# Patient Record
Sex: MALE | Race: WHITE | HISPANIC OR LATINO | URBAN - METROPOLITAN AREA
[De-identification: names, ages, dates, MRNs, and addresses within clinical notes are randomized per-mention and may not be internally consistent; named-entity substitution may affect disease eponyms.]

---

## 2020-04-20 ENCOUNTER — TELEPHONE (OUTPATIENT)
Dept: FAMILY MEDICINE | Facility: CLINIC | Age: 85
End: 2020-04-20

## 2020-04-20 NOTE — TELEPHONE ENCOUNTER
Message left for patient to return call to clinic in re: needs to reschedule appointment due to Dr. Wilkerson being out of the clinic.

## 2020-04-22 ENCOUNTER — TELEPHONE (OUTPATIENT)
Dept: FAMILY MEDICINE | Facility: CLINIC | Age: 85
End: 2020-04-22

## 2020-10-27 ENCOUNTER — OFFICE VISIT (OUTPATIENT)
Dept: FAMILY MEDICINE | Facility: CLINIC | Age: 85
End: 2020-10-27
Attending: FAMILY MEDICINE
Payer: COMMERCIAL

## 2020-10-27 VITALS
HEIGHT: 65 IN | HEART RATE: 77 BPM | DIASTOLIC BLOOD PRESSURE: 70 MMHG | OXYGEN SATURATION: 96 % | TEMPERATURE: 98 F | SYSTOLIC BLOOD PRESSURE: 148 MMHG | BODY MASS INDEX: 25.29 KG/M2 | WEIGHT: 151.81 LBS

## 2020-10-27 DIAGNOSIS — Z00.00 WELLNESS EXAMINATION: Primary | ICD-10-CM

## 2020-10-27 DIAGNOSIS — Z72.0 TOBACCO ABUSE: ICD-10-CM

## 2020-10-27 DIAGNOSIS — R09.82 POST-NASAL DRIP: ICD-10-CM

## 2020-10-27 DIAGNOSIS — Z12.5 PROSTATE CANCER SCREENING: ICD-10-CM

## 2020-10-27 PROCEDURE — 1159F MED LIST DOCD IN RCRD: CPT | Mod: S$GLB,,, | Performed by: FAMILY MEDICINE

## 2020-10-27 PROCEDURE — 1159F PR MEDICATION LIST DOCUMENTED IN MEDICAL RECORD: ICD-10-PCS | Mod: S$GLB,,, | Performed by: FAMILY MEDICINE

## 2020-10-27 PROCEDURE — 99204 OFFICE O/P NEW MOD 45 MIN: CPT | Mod: 25,S$GLB,, | Performed by: FAMILY MEDICINE

## 2020-10-27 PROCEDURE — 99204 PR OFFICE/OUTPT VISIT, NEW, LEVL IV, 45-59 MIN: ICD-10-PCS | Mod: 25,S$GLB,, | Performed by: FAMILY MEDICINE

## 2020-10-27 PROCEDURE — 90694 FLU VACCINE - QUADRIVALENT - ADJUVANTED: ICD-10-PCS | Mod: S$GLB,,, | Performed by: FAMILY MEDICINE

## 2020-10-27 PROCEDURE — 99999 PR PBB SHADOW E&M-EST. PATIENT-LVL IV: ICD-10-PCS | Mod: PBBFAC,,, | Performed by: FAMILY MEDICINE

## 2020-10-27 PROCEDURE — 90471 IMMUNIZATION ADMIN: CPT | Mod: S$GLB,,, | Performed by: FAMILY MEDICINE

## 2020-10-27 PROCEDURE — 1101F PR PT FALLS ASSESS DOC 0-1 FALLS W/OUT INJ PAST YR: ICD-10-PCS | Mod: CPTII,S$GLB,, | Performed by: FAMILY MEDICINE

## 2020-10-27 PROCEDURE — 90471 FLU VACCINE - QUADRIVALENT - ADJUVANTED: ICD-10-PCS | Mod: S$GLB,,, | Performed by: FAMILY MEDICINE

## 2020-10-27 PROCEDURE — 1101F PT FALLS ASSESS-DOCD LE1/YR: CPT | Mod: CPTII,S$GLB,, | Performed by: FAMILY MEDICINE

## 2020-10-27 PROCEDURE — 1126F AMNT PAIN NOTED NONE PRSNT: CPT | Mod: S$GLB,,, | Performed by: FAMILY MEDICINE

## 2020-10-27 PROCEDURE — 90694 VACC AIIV4 NO PRSRV 0.5ML IM: CPT | Mod: S$GLB,,, | Performed by: FAMILY MEDICINE

## 2020-10-27 PROCEDURE — 1126F PR PAIN SEVERITY QUANTIFIED, NO PAIN PRESENT: ICD-10-PCS | Mod: S$GLB,,, | Performed by: FAMILY MEDICINE

## 2020-10-27 PROCEDURE — 99999 PR PBB SHADOW E&M-EST. PATIENT-LVL IV: CPT | Mod: PBBFAC,,, | Performed by: FAMILY MEDICINE

## 2020-10-27 RX ORDER — MINERAL OIL
180 ENEMA (ML) RECTAL DAILY
Qty: 30 TABLET | Refills: 11 | Status: SHIPPED | OUTPATIENT
Start: 2020-10-27 | End: 2021-04-10 | Stop reason: SDUPTHER

## 2020-10-27 NOTE — PROGRESS NOTES
Subjective:       Patient ID: Real Pagan is a 85 y.o. male.    Chief Complaint: Establish Care    85 y old male with tobacco abuse here to get est . Only concern is post nasal drip . Mainly with abrupt changes in whether. Not taking any  Meds. He is from [place] , he moved to [place] 2 [place] ago . Lives with his daughter . Will like flu shot . Stays active.Denies any visual difficulties . He used to be a  .  No depression . No sob , smoker cough , wheezes     Review of Systems   Constitutional: Negative.    HENT: Positive for postnasal drip.    Eyes: Negative.    Respiratory: Negative.    Cardiovascular: Negative.    Gastrointestinal: Negative.    Genitourinary: Negative.    Musculoskeletal: Negative.    Skin: Negative.    Hematological: Negative.        Objective:      Physical Exam  Constitutional:       General: He is not in acute distress.     Appearance: He is well-developed. He is not diaphoretic.   HENT:      Head: Normocephalic and atraumatic.      Right Ear: External ear normal.      Left Ear: External ear normal.      Nose: Nose normal.      Mouth/Throat:      Pharynx: No oropharyngeal exudate.   Eyes:      General: No scleral icterus.        Right eye: No discharge.         Left eye: No discharge.      Conjunctiva/sclera: Conjunctivae normal.      Pupils: Pupils are equal, round, and reactive to light.   Neck:      Musculoskeletal: Normal range of motion and neck supple.      Thyroid: No thyromegaly.      Vascular: No JVD.      Trachea: No tracheal deviation.   Cardiovascular:      Rate and Rhythm: Normal rate and regular rhythm.      Heart sounds: Normal heart sounds. No murmur. No friction rub. No gallop.    Pulmonary:      Effort: Pulmonary effort is normal. No respiratory distress.      Breath sounds: Normal breath sounds. No stridor. No wheezing or rales.   Chest:      Chest wall: No tenderness.   Abdominal:      General: Bowel sounds are normal. There is no distension.      Palpations: Abdomen  is soft.      Tenderness: There is no abdominal tenderness. There is no guarding or rebound.   Musculoskeletal: Normal range of motion.         General: No tenderness.   Lymphadenopathy:      Cervical: No cervical adenopathy.   Skin:     General: Skin is warm and dry.      Coloration: Skin is not pale.      Findings: No erythema or rash.   Neurological:      Mental Status: He is alert and oriented to person, place, and time.      Cranial Nerves: No cranial nerve deficit.      Motor: No abnormal muscle tone.      Coordination: Coordination normal.      Deep Tendon Reflexes: Reflexes are normal and symmetric. Reflexes normal.   Psychiatric:         Behavior: Behavior normal.         Thought Content: Thought content normal.         Judgment: Judgment normal.         Assessment:       Real was seen today for establish care.    Diagnoses and all orders for this visit:    Wellness examination  -     Ambulatory referral/consult to Ophthalmology; Future  -     CBC auto differential; Future  -     Comprehensive Metabolic Panel; Future  -     Hemoglobin A1C; Future  -     Lipid Panel; Future    Tobacco abuse    Prostate cancer screening  -     PSA, Screening; Future    Post-nasal drip    Other orders  -     Influenza - Quadrivalent (Adjuvanted)  -     fexofenadine (ALLEGRA) 180 MG tablet; Take 1 tablet (180 mg total) by mouth once daily.      Plan:     Real was seen today for establish care.    Diagnoses and all orders for this visit:    Tobacco abuse     Work on smoking cessation   PSA  PRN allegra   Elevated BP . Keep Home LOG . Zee in 3 w

## 2020-11-16 ENCOUNTER — OFFICE VISIT (OUTPATIENT)
Dept: OPHTHALMOLOGY | Facility: CLINIC | Age: 85
End: 2020-11-16
Attending: FAMILY MEDICINE
Payer: MEDICAID

## 2020-11-16 ENCOUNTER — LAB VISIT (OUTPATIENT)
Dept: LAB | Facility: HOSPITAL | Age: 85
End: 2020-11-16
Attending: FAMILY MEDICINE
Payer: MEDICAID

## 2020-11-16 DIAGNOSIS — Z00.00 WELLNESS EXAMINATION: ICD-10-CM

## 2020-11-16 DIAGNOSIS — Z12.5 PROSTATE CANCER SCREENING: ICD-10-CM

## 2020-11-16 DIAGNOSIS — Z96.1 PSEUDOPHAKIA OF BOTH EYES: Primary | ICD-10-CM

## 2020-11-16 DIAGNOSIS — Z01.00 ENCOUNTER FOR EYE EXAM: ICD-10-CM

## 2020-11-16 LAB
ALBUMIN SERPL BCP-MCNC: 3.8 G/DL (ref 3.5–5.2)
ALP SERPL-CCNC: 104 U/L (ref 55–135)
ALT SERPL W/O P-5'-P-CCNC: 17 U/L (ref 10–44)
ANION GAP SERPL CALC-SCNC: 10 MMOL/L (ref 8–16)
AST SERPL-CCNC: 16 U/L (ref 10–40)
BASOPHILS # BLD AUTO: 0.03 K/UL (ref 0–0.2)
BASOPHILS NFR BLD: 0.4 % (ref 0–1.9)
BILIRUB SERPL-MCNC: 0.2 MG/DL (ref 0.1–1)
BUN SERPL-MCNC: 18 MG/DL (ref 8–23)
CALCIUM SERPL-MCNC: 9 MG/DL (ref 8.7–10.5)
CHLORIDE SERPL-SCNC: 105 MMOL/L (ref 95–110)
CHOLEST SERPL-MCNC: 203 MG/DL (ref 120–199)
CHOLEST/HDLC SERPL: 6.8 {RATIO} (ref 2–5)
CO2 SERPL-SCNC: 28 MMOL/L (ref 23–29)
CREAT SERPL-MCNC: 0.8 MG/DL (ref 0.5–1.4)
DIFFERENTIAL METHOD: ABNORMAL
EOSINOPHIL # BLD AUTO: 0.1 K/UL (ref 0–0.5)
EOSINOPHIL NFR BLD: 1.8 % (ref 0–8)
ERYTHROCYTE [DISTWIDTH] IN BLOOD BY AUTOMATED COUNT: 13.9 % (ref 11.5–14.5)
EST. GFR  (AFRICAN AMERICAN): >60 ML/MIN/1.73 M^2
EST. GFR  (NON AFRICAN AMERICAN): >60 ML/MIN/1.73 M^2
ESTIMATED AVG GLUCOSE: 120 MG/DL (ref 68–131)
GLUCOSE SERPL-MCNC: 99 MG/DL (ref 70–110)
HBA1C MFR BLD HPLC: 5.8 % (ref 4–5.6)
HCT VFR BLD AUTO: 41.4 % (ref 40–54)
HDLC SERPL-MCNC: 30 MG/DL (ref 40–75)
HDLC SERPL: 14.8 % (ref 20–50)
HGB BLD-MCNC: 12.5 G/DL (ref 14–18)
IMM GRANULOCYTES # BLD AUTO: 0.01 K/UL (ref 0–0.04)
IMM GRANULOCYTES NFR BLD AUTO: 0.1 % (ref 0–0.5)
LDLC SERPL CALC-MCNC: 136.6 MG/DL (ref 63–159)
LYMPHOCYTES # BLD AUTO: 2.4 K/UL (ref 1–4.8)
LYMPHOCYTES NFR BLD: 32.6 % (ref 18–48)
MCH RBC QN AUTO: 29.1 PG (ref 27–31)
MCHC RBC AUTO-ENTMCNC: 30.2 G/DL (ref 32–36)
MCV RBC AUTO: 97 FL (ref 82–98)
MONOCYTES # BLD AUTO: 0.5 K/UL (ref 0.3–1)
MONOCYTES NFR BLD: 6.4 % (ref 4–15)
NEUTROPHILS # BLD AUTO: 4.3 K/UL (ref 1.8–7.7)
NEUTROPHILS NFR BLD: 58.7 % (ref 38–73)
NONHDLC SERPL-MCNC: 173 MG/DL
NRBC BLD-RTO: 0 /100 WBC
PLATELET # BLD AUTO: 154 K/UL (ref 150–350)
PMV BLD AUTO: 9.7 FL (ref 9.2–12.9)
POTASSIUM SERPL-SCNC: 3.9 MMOL/L (ref 3.5–5.1)
PROT SERPL-MCNC: 7.6 G/DL (ref 6–8.4)
RBC # BLD AUTO: 4.29 M/UL (ref 4.6–6.2)
SODIUM SERPL-SCNC: 143 MMOL/L (ref 136–145)
TRIGL SERPL-MCNC: 182 MG/DL (ref 30–150)
WBC # BLD AUTO: 7.31 K/UL (ref 3.9–12.7)

## 2020-11-16 PROCEDURE — 99212 OFFICE O/P EST SF 10 MIN: CPT | Mod: PBBFAC | Performed by: OPTOMETRIST

## 2020-11-16 PROCEDURE — 92015 DETERMINE REFRACTIVE STATE: CPT | Mod: ,,, | Performed by: OPTOMETRIST

## 2020-11-16 PROCEDURE — 84153 ASSAY OF PSA TOTAL: CPT

## 2020-11-16 PROCEDURE — 92004 PR EYE EXAM, NEW PATIENT,COMPREHESV: ICD-10-PCS | Mod: S$PBB,,, | Performed by: OPTOMETRIST

## 2020-11-16 PROCEDURE — 92015 PR REFRACTION: ICD-10-PCS | Mod: ,,, | Performed by: OPTOMETRIST

## 2020-11-16 PROCEDURE — 36415 COLL VENOUS BLD VENIPUNCTURE: CPT | Mod: PO

## 2020-11-16 PROCEDURE — 80061 LIPID PANEL: CPT

## 2020-11-16 PROCEDURE — 83036 HEMOGLOBIN GLYCOSYLATED A1C: CPT

## 2020-11-16 PROCEDURE — 99999 PR PBB SHADOW E&M-EST. PATIENT-LVL II: ICD-10-PCS | Mod: PBBFAC,,, | Performed by: OPTOMETRIST

## 2020-11-16 PROCEDURE — 80053 COMPREHEN METABOLIC PANEL: CPT

## 2020-11-16 PROCEDURE — 99999 PR PBB SHADOW E&M-EST. PATIENT-LVL II: CPT | Mod: PBBFAC,,, | Performed by: OPTOMETRIST

## 2020-11-16 PROCEDURE — 85025 COMPLETE CBC W/AUTO DIFF WBC: CPT

## 2020-11-16 PROCEDURE — 92004 COMPRE OPH EXAM NEW PT 1/>: CPT | Mod: S$PBB,,, | Performed by: OPTOMETRIST

## 2020-11-16 NOTE — PROGRESS NOTES
HPI     NP to Ophthalmology.  C/o decreased vision, problems seeing TV.  Doesn't   wear glasses. Had cataract surgery OU.    Last edited by Love Carrillo on 11/16/2020 11:24 AM. (History)            Assessment /Plan     For exam results, see Encounter Report.    Pseudophakia of both eyes    Wellness examination  -     Ambulatory referral/consult to Ophthalmology    Encounter for eye exam      Stable IOL OU.  Mild PCO OS    Dispense Final Rx for glasses.  RTC 1 year  Discussed above and answered questions.

## 2020-11-16 NOTE — LETTER
November 16, 2020      Shruthi Burr MD  [address] [facility] [address]           [facility]  [address] [place] [address]  Phone: [phone]  Fax: [phone]          Patient: Real Pagan   MR Number: [id number]   YOB: 1935   Date of Visit: 11/16/2020       Dear Dr. Shruthi Burr:    Thank you for referring Real Pagan to me for evaluation. Attached you will find relevant portions of my assessment and plan of care.    If you have questions, please do not hesitate to call me. I look forward to following Real Pagan along with you.    Sincerely,    Cristopher Napoles, OD    Enclosure  CC:  No Recipients    If you would like to receive this communication electronically, please contact [email or web address] or [phone] to request more information on [employer] Link access.    For providers and/or their staff who would like to refer a patient to [facility], please contact us through our one-stop-shop provider referral line, [facility], at [phone].    If you feel you have received this communication in error or would no longer like to receive these types of communications, please e-mail [email or web address]

## 2020-11-17 ENCOUNTER — TELEPHONE (OUTPATIENT)
Dept: FAMILY MEDICINE | Facility: CLINIC | Age: 85
End: 2020-11-17

## 2020-11-17 DIAGNOSIS — R97.20 ELEVATED PSA: Primary | ICD-10-CM

## 2020-11-17 LAB — COMPLEXED PSA SERPL-MCNC: 9.9 NG/ML (ref 0–4)

## 2020-11-23 ENCOUNTER — OFFICE VISIT (OUTPATIENT)
Dept: FAMILY MEDICINE | Facility: CLINIC | Age: 85
End: 2020-11-23
Attending: FAMILY MEDICINE
Payer: COMMERCIAL

## 2020-11-23 VITALS
HEIGHT: 65 IN | SYSTOLIC BLOOD PRESSURE: 126 MMHG | DIASTOLIC BLOOD PRESSURE: 78 MMHG | TEMPERATURE: 97 F | HEART RATE: 71 BPM | BODY MASS INDEX: 24.79 KG/M2 | WEIGHT: 148.81 LBS | OXYGEN SATURATION: 97 %

## 2020-11-23 DIAGNOSIS — E78.5 DYSLIPIDEMIA: ICD-10-CM

## 2020-11-23 DIAGNOSIS — D64.9 ANEMIA, UNSPECIFIED TYPE: Primary | ICD-10-CM

## 2020-11-23 DIAGNOSIS — R73.03 PRE-DIABETES: ICD-10-CM

## 2020-11-23 DIAGNOSIS — R97.20 ELEVATED PSA: ICD-10-CM

## 2020-11-23 PROCEDURE — 99214 PR OFFICE/OUTPT VISIT, EST, LEVL IV, 30-39 MIN: ICD-10-PCS | Mod: S$GLB,,, | Performed by: FAMILY MEDICINE

## 2020-11-23 PROCEDURE — 99214 OFFICE O/P EST MOD 30 MIN: CPT | Mod: S$GLB,,, | Performed by: FAMILY MEDICINE

## 2020-11-23 PROCEDURE — 3288F FALL RISK ASSESSMENT DOCD: CPT | Mod: CPTII,S$GLB,, | Performed by: FAMILY MEDICINE

## 2020-11-23 PROCEDURE — 1101F PT FALLS ASSESS-DOCD LE1/YR: CPT | Mod: CPTII,S$GLB,, | Performed by: FAMILY MEDICINE

## 2020-11-23 PROCEDURE — 1126F AMNT PAIN NOTED NONE PRSNT: CPT | Mod: S$GLB,,, | Performed by: FAMILY MEDICINE

## 2020-11-23 PROCEDURE — 99999 PR PBB SHADOW E&M-EST. PATIENT-LVL IV: ICD-10-PCS | Mod: PBBFAC,,, | Performed by: FAMILY MEDICINE

## 2020-11-23 PROCEDURE — 99999 PR PBB SHADOW E&M-EST. PATIENT-LVL IV: CPT | Mod: PBBFAC,,, | Performed by: FAMILY MEDICINE

## 2020-11-23 PROCEDURE — 1159F MED LIST DOCD IN RCRD: CPT | Mod: S$GLB,,, | Performed by: FAMILY MEDICINE

## 2020-11-23 PROCEDURE — 1101F PR PT FALLS ASSESS DOC 0-1 FALLS W/OUT INJ PAST YR: ICD-10-PCS | Mod: CPTII,S$GLB,, | Performed by: FAMILY MEDICINE

## 2020-11-23 PROCEDURE — 1159F PR MEDICATION LIST DOCUMENTED IN MEDICAL RECORD: ICD-10-PCS | Mod: S$GLB,,, | Performed by: FAMILY MEDICINE

## 2020-11-23 PROCEDURE — 1126F PR PAIN SEVERITY QUANTIFIED, NO PAIN PRESENT: ICD-10-PCS | Mod: S$GLB,,, | Performed by: FAMILY MEDICINE

## 2020-11-23 PROCEDURE — 3288F PR FALLS RISK ASSESSMENT DOCUMENTED: ICD-10-PCS | Mod: CPTII,S$GLB,, | Performed by: FAMILY MEDICINE

## 2020-11-23 RX ORDER — ROSUVASTATIN CALCIUM 10 MG/1
10 TABLET, COATED ORAL DAILY
Qty: 90 TABLET | Refills: 3 | Status: SHIPPED | OUTPATIENT
Start: 2020-11-23 | End: 2022-05-07

## 2020-11-23 NOTE — PROGRESS NOTES
Subjective:       Patient ID: Real Pagan is a 85 y.o. male.    Chief Complaint: Follow-up    85 y old male with tobacco abuse here to discuss most recent labs ( anemia, dlp ,pre dm and elevated pSA). He has urinary complains that he cant recall about 15 y ago which living in [place]. He was evaluated then . Denies hx of biopsy , no dysuria , urinary frequency , hesitancy . Not ready to quit smoking .     Review of Systems   Constitutional: Negative.    HENT: Negative.    Eyes: Negative.    Respiratory: Negative.    Cardiovascular: Negative.    Gastrointestinal: Negative.    Genitourinary: Negative.    Musculoskeletal: Negative.    Skin: Negative.    Hematological: Negative.        Objective:      Physical Exam  Constitutional:       General: He is not in acute distress.     Appearance: He is well-developed. He is not diaphoretic.   HENT:      Head: Normocephalic and atraumatic.      Right Ear: External ear normal.      Left Ear: External ear normal.      Nose: Nose normal.      Mouth/Throat:      Pharynx: No oropharyngeal exudate.   Eyes:      General: No scleral icterus.        Right eye: No discharge.         Left eye: No discharge.      Conjunctiva/sclera: Conjunctivae normal.      Pupils: Pupils are equal, round, and reactive to light.   Neck:      Musculoskeletal: Normal range of motion and neck supple.      Thyroid: No thyromegaly.      Vascular: No JVD.      Trachea: No tracheal deviation.   Cardiovascular:      Rate and Rhythm: Normal rate and regular rhythm.      Heart sounds: Normal heart sounds. No murmur. No friction rub. No gallop.    Pulmonary:      Effort: Pulmonary effort is normal. No respiratory distress.      Breath sounds: Normal breath sounds. No stridor. No wheezing or rales.   Chest:      Chest wall: No tenderness.   Abdominal:      General: Bowel sounds are normal. There is no distension.      Palpations: Abdomen is soft.      Tenderness: There is no abdominal tenderness. There is no  guarding or rebound.   Musculoskeletal: Normal range of motion.         General: No tenderness.   Lymphadenopathy:      Cervical: No cervical adenopathy.   Skin:     General: Skin is warm and dry.      Coloration: Skin is not pale.      Findings: No erythema or rash.   Neurological:      Mental Status: He is alert and oriented to person, place, and time.      Cranial Nerves: No cranial nerve deficit.      Motor: No abnormal muscle tone.      Coordination: Coordination normal.      Deep Tendon Reflexes: Reflexes are normal and symmetric. Reflexes normal.   Psychiatric:         Behavior: Behavior normal.         Thought Content: Thought content normal.         Judgment: Judgment normal.         Assessment:       1. Anemia, unspecified type    2. Dyslipidemia    3. Elevated PSA    4. Pre-diabetes        Plan:     Real was seen today for follow-up.    Diagnoses and all orders for this visit:    Anemia, unspecified type  -     Ambulatory referral/consult to Hematology / Oncology; Future    Dyslipidemia  -     rosuvastatin (CRESTOR) 10 MG tablet; Take 1 tablet (10 mg total) by mouth once daily.    Elevated PSA    Pre-diabetes     see hem   Crestor . SE discussed.  F.u  In 3 m   See Urology

## 2020-12-03 ENCOUNTER — OFFICE VISIT (OUTPATIENT)
Dept: UROLOGY | Facility: CLINIC | Age: 85
End: 2020-12-03
Attending: FAMILY MEDICINE
Payer: COMMERCIAL

## 2020-12-03 ENCOUNTER — LAB VISIT (OUTPATIENT)
Dept: LAB | Facility: HOSPITAL | Age: 85
End: 2020-12-03
Attending: INTERNAL MEDICINE
Payer: COMMERCIAL

## 2020-12-03 ENCOUNTER — OFFICE VISIT (OUTPATIENT)
Dept: HEMATOLOGY/ONCOLOGY | Facility: CLINIC | Age: 85
End: 2020-12-03
Attending: FAMILY MEDICINE
Payer: COMMERCIAL

## 2020-12-03 VITALS
HEART RATE: 71 BPM | WEIGHT: 151 LBS | TEMPERATURE: 98 F | BODY MASS INDEX: 25.16 KG/M2 | HEIGHT: 65 IN | SYSTOLIC BLOOD PRESSURE: 138 MMHG | DIASTOLIC BLOOD PRESSURE: 62 MMHG

## 2020-12-03 VITALS
DIASTOLIC BLOOD PRESSURE: 62 MMHG | BODY MASS INDEX: 25.27 KG/M2 | WEIGHT: 151.69 LBS | TEMPERATURE: 98 F | HEIGHT: 65 IN | SYSTOLIC BLOOD PRESSURE: 138 MMHG | OXYGEN SATURATION: 97 % | HEART RATE: 71 BPM

## 2020-12-03 DIAGNOSIS — D64.9 ANEMIA, UNSPECIFIED TYPE: ICD-10-CM

## 2020-12-03 DIAGNOSIS — R97.20 ELEVATED PSA: ICD-10-CM

## 2020-12-03 PROCEDURE — 1159F MED LIST DOCD IN RCRD: CPT | Mod: S$GLB,,, | Performed by: UROLOGY

## 2020-12-03 PROCEDURE — 1159F MED LIST DOCD IN RCRD: CPT | Mod: S$GLB,,, | Performed by: INTERNAL MEDICINE

## 2020-12-03 PROCEDURE — 82746 ASSAY OF FOLIC ACID SERUM: CPT

## 2020-12-03 PROCEDURE — 1126F PR PAIN SEVERITY QUANTIFIED, NO PAIN PRESENT: ICD-10-PCS | Mod: S$GLB,,, | Performed by: INTERNAL MEDICINE

## 2020-12-03 PROCEDURE — 1126F AMNT PAIN NOTED NONE PRSNT: CPT | Mod: S$GLB,,, | Performed by: INTERNAL MEDICINE

## 2020-12-03 PROCEDURE — 1159F PR MEDICATION LIST DOCUMENTED IN MEDICAL RECORD: ICD-10-PCS | Mod: S$GLB,,, | Performed by: INTERNAL MEDICINE

## 2020-12-03 PROCEDURE — 99999 PR PBB SHADOW E&M-EST. PATIENT-LVL III: CPT | Mod: PBBFAC,,, | Performed by: UROLOGY

## 2020-12-03 PROCEDURE — 99203 PR OFFICE/OUTPT VISIT, NEW, LEVL III, 30-44 MIN: ICD-10-PCS | Mod: S$GLB,,, | Performed by: UROLOGY

## 2020-12-03 PROCEDURE — 1126F AMNT PAIN NOTED NONE PRSNT: CPT | Mod: S$GLB,,, | Performed by: UROLOGY

## 2020-12-03 PROCEDURE — 1126F PR PAIN SEVERITY QUANTIFIED, NO PAIN PRESENT: ICD-10-PCS | Mod: S$GLB,,, | Performed by: UROLOGY

## 2020-12-03 PROCEDURE — 1101F PR PT FALLS ASSESS DOC 0-1 FALLS W/OUT INJ PAST YR: ICD-10-PCS | Mod: CPTII,S$GLB,, | Performed by: UROLOGY

## 2020-12-03 PROCEDURE — 36415 COLL VENOUS BLD VENIPUNCTURE: CPT

## 2020-12-03 PROCEDURE — 1101F PR PT FALLS ASSESS DOC 0-1 FALLS W/OUT INJ PAST YR: ICD-10-PCS | Mod: CPTII,S$GLB,, | Performed by: INTERNAL MEDICINE

## 2020-12-03 PROCEDURE — 99999 PR PBB SHADOW E&M-EST. PATIENT-LVL III: ICD-10-PCS | Mod: PBBFAC,,, | Performed by: UROLOGY

## 2020-12-03 PROCEDURE — 82728 ASSAY OF FERRITIN: CPT

## 2020-12-03 PROCEDURE — 3288F FALL RISK ASSESSMENT DOCD: CPT | Mod: CPTII,S$GLB,, | Performed by: INTERNAL MEDICINE

## 2020-12-03 PROCEDURE — 99999 PR PBB SHADOW E&M-EST. PATIENT-LVL IV: CPT | Mod: PBBFAC,,, | Performed by: INTERNAL MEDICINE

## 2020-12-03 PROCEDURE — 1159F PR MEDICATION LIST DOCUMENTED IN MEDICAL RECORD: ICD-10-PCS | Mod: S$GLB,,, | Performed by: UROLOGY

## 2020-12-03 PROCEDURE — 82607 VITAMIN B-12: CPT

## 2020-12-03 PROCEDURE — 99203 OFFICE O/P NEW LOW 30 MIN: CPT | Mod: S$GLB,,, | Performed by: UROLOGY

## 2020-12-03 PROCEDURE — 3288F FALL RISK ASSESSMENT DOCD: CPT | Mod: CPTII,S$GLB,, | Performed by: UROLOGY

## 2020-12-03 PROCEDURE — 99999 PR PBB SHADOW E&M-EST. PATIENT-LVL IV: ICD-10-PCS | Mod: PBBFAC,,, | Performed by: INTERNAL MEDICINE

## 2020-12-03 PROCEDURE — 3288F PR FALLS RISK ASSESSMENT DOCUMENTED: ICD-10-PCS | Mod: CPTII,S$GLB,, | Performed by: UROLOGY

## 2020-12-03 PROCEDURE — 99203 OFFICE O/P NEW LOW 30 MIN: CPT | Mod: S$GLB,,, | Performed by: INTERNAL MEDICINE

## 2020-12-03 PROCEDURE — 1101F PT FALLS ASSESS-DOCD LE1/YR: CPT | Mod: CPTII,S$GLB,, | Performed by: UROLOGY

## 2020-12-03 PROCEDURE — 3288F PR FALLS RISK ASSESSMENT DOCUMENTED: ICD-10-PCS | Mod: CPTII,S$GLB,, | Performed by: INTERNAL MEDICINE

## 2020-12-03 PROCEDURE — 1101F PT FALLS ASSESS-DOCD LE1/YR: CPT | Mod: CPTII,S$GLB,, | Performed by: INTERNAL MEDICINE

## 2020-12-03 PROCEDURE — 99203 PR OFFICE/OUTPT VISIT, NEW, LEVL III, 30-44 MIN: ICD-10-PCS | Mod: S$GLB,,, | Performed by: INTERNAL MEDICINE

## 2020-12-03 PROCEDURE — 83540 ASSAY OF IRON: CPT

## 2020-12-03 NOTE — PROGRESS NOTES
Chief Complaint: elevated PSA    HPI:   Real Pagan is a 85 y.o. male that presents today as a referral from Dr. Burr for elevated PSA.  Patient relocated from [place] about 4 years ago and currently resides with his family. He had a PSA drawn as part of annual screening which resulted at 9.9.  He denies a family history of prostate cancer, denies urinary symptoms, denies gross hematuria.  Does not take any medications for his prostate. Has never had a prior PSA drawn.      PMH:  Tobacco abuse, anemia, postnasal drip    PSH:  Past Surgical History:   Procedure Laterality Date    CATARACT EXTRACTION, BILATERAL         Family History:  Family History   Problem Relation Age of Onset    Diabetes Daughter        Social History:  Social History     Tobacco Use    Smoking status: Current Every Day Smoker     Packs/day: 0.50     Years: 63.00     Pack years: 31.50     Types: Cigarettes    Smokeless tobacco: Never Used   Substance Use Topics    Alcohol use: Yes     Frequency: Never    Drug use: Never        Review of Systems:  General: No fever, chills  Skin: No rashes  Chest:  Denies cough and sputum production  Heart: Denies chest pain  Resp: Denies dyspnea  Abdomen: Denies diarrhea, abdominal pain, hematemesis, or blood in stool.  Musculoskeletal: No joint stiffness or swelling. Denies back pain.  : see HPI  Neuro: no dizziness or weakness    Allergies:  Patient has no known allergies.    Medications:    Current Outpatient Medications:     fexofenadine (ALLEGRA) 180 MG tablet, Take 1 tablet (180 mg total) by mouth once daily., Disp: 30 tablet, Rfl: 11    rosuvastatin (CRESTOR) 10 MG tablet, Take 1 tablet (10 mg total) by mouth once daily., Disp: 90 tablet, Rfl: 3    Physical Exam:  Vitals:    12/03/20 1330   BP: 138/62   Pulse: 71   Temp: 98.2 °F (36.8 °C)     General: awake, alert, cooperative  Head: NC/AT  Ears: external ears normal  Eyes: sclera normal  Lungs: normal inspiration, NAD  Heart:  well-perfused  Abdomen: Soft, NT, ND  : Normal uncirc'd phallus, meatus normal in size and position, BL testicles palpable, no masses, nontender, no abnormalities of epididymi  KHURRAM: Normal rectal tone, no hemorrhoids. Prostate smooth and normal, no nodules 45 gm SV not palpable. Perineum and anus normal.  Lymphatic: groin nodes negative  Back: No CVA TTP, no spine TTP  Skin: The skin is warm and dry  Ext: No c/c/e.  Neuro: grossly intact, AOx3    RADIOLOGY:  No recent relevant imaging available for review.    LABS:  I personally reviewed the following lab values:  Lab Results   Component Value Date    WBC 7.31 11/16/2020    HGB 12.5 (L) 11/16/2020    HCT 41.4 11/16/2020     11/16/2020     11/16/2020    K 3.9 11/16/2020     11/16/2020    CREATININE 0.8 11/16/2020    BUN 18 11/16/2020    CO2 28 11/16/2020    PSA 9.9 (H) 11/16/2020    HGBA1C 5.8 (H) 11/16/2020    CHOL 203 (H) 11/16/2020    TRIG 182 (H) 11/16/2020    HDL 30 (L) 11/16/2020    ALT 17 11/16/2020    AST 16 11/16/2020       Assessment/Plan:   Real Pagan is a 85 y.o. male with elevated PSA. Normal KHURRAM today. Per the AUA guidelines PSA screening should stop at age 70 (in my opinion, up to 75 in very healthy patients). Would stop PSA screening at this time. F/u 1 yr for KHURRAM prostate cancer screening.    Thank you for allowing me the opportunity to participate in this patient's care.     Abhilash Saavedra MD  Urology

## 2020-12-03 NOTE — PROGRESS NOTES
Subjective:      DATE OF VISIT: 12/3/20     ?  Patient ID:?Real Pagan is a 85 y.o. male.?? MR#: [id number]   ?   REFERRING PROVIDER: Shruthi Burr MD  [address] [place] [id number]     ? Primary Care Providers:  Primary Doctor No (General)     CHIEF COMPLAINT: ?  Anemia??   ?   HPI    I had the pleasure meeting Mr. Pagan who is accompanied by his son today for evaluation of anemia.  Routine lab work showed hemoglobin 12.5, normocytic without other cytopenias.  PSA was checked and elevated to 9.9.  He has recently transferred his care and no prior lab work available.  We are assisted by Persian-speaking .  He denies any symptoms of fatigue, anorexia, unintentional weight loss, bony or other pain, urinary change/frequency, dysuria, hematuria, melena, hematochezia or other evidence of bleeding.  No prior personal or family history of malignancy.  He is quite active for his age and walks regularly.  He is an active smoker.  No current alcohol use.    Review of Systems    ?   A comprehensive 14-point review of systems was reviewed with patient and was negative other than as specified above.   ?   PAST MEDICAL HISTORY:   History reviewed. No pertinent past medical history. ?     PAST SURGICAL HISTORY:   Past Surgical History:   Procedure Laterality Date    CATARACT EXTRACTION, BILATERAL        ?   ALLERGIES:   Allergies as of 12/03/2020    (No Known Allergies)      ?   MEDICATIONS:?   No outpatient medications have been marked as taking for the 12/3/20 encounter (Office Visit) with Lindsay Paiz MD.      ?   SOCIAL HISTORY:?   Social History     Tobacco Use    Smoking status: Current Every Day Smoker     Packs/day: 0.50     Years: 63.00     Pack years: 31.50     Types: Cigarettes    Smokeless tobacco: Never Used   Substance Use Topics    Alcohol use: Yes     Frequency: Never      ?      ?   FAMILY HISTORY:   family history includes Diabetes in his daughter.    ?        Objective:      Physical Exam      ?   Vitals:    12/03/20 1514   BP: 138/62   Pulse: 71   Temp: 98.2 °F (36.8 °C)      ?   ECOG:?1   General appearance: Generally well appearing, in no acute distress.   Head, eyes, ears, nose, and throat: moist mucous membranes.   Respiratory:  Normal work of breathing  Abdomen: nontender, mildly protuberant..   Extremities: Warm, without edema.   Neurologic: Alert and oriented.   Skin: No rashes, ecchymoses or petechial lesion.   Psychiatric:  Normal mood and affect.    ?   Laboratory:  ?   No visits with results within 1 Day(s) from this visit.   Latest known visit with results is:   Lab Visit on 11/16/2020   Component Date Value Ref Range Status    WBC 11/16/2020 7.31  3.90 - 12.70 K/uL Final    RBC 11/16/2020 4.29* 4.60 - 6.20 M/uL Final    Hemoglobin 11/16/2020 12.5* 14.0 - 18.0 g/dL Final    Hematocrit 11/16/2020 41.4  40.0 - 54.0 % Final    MCV 11/16/2020 97  82 - 98 fL Final    MCH 11/16/2020 29.1  27.0 - 31.0 pg Final    MCHC 11/16/2020 30.2* 32.0 - 36.0 g/dL Final    RDW 11/16/2020 13.9  11.5 - 14.5 % Final    Platelets 11/16/2020 154  150 - 350 K/uL Final    MPV 11/16/2020 9.7  9.2 - 12.9 fL Final    Immature Granulocytes 11/16/2020 0.1  0.0 - 0.5 % Final    Gran # (ANC) 11/16/2020 4.3  1.8 - 7.7 K/uL Final    Immature Grans (Abs) 11/16/2020 0.01  0.00 - 0.04 K/uL Final    Lymph # 11/16/2020 2.4  1.0 - 4.8 K/uL Final    Mono # 11/16/2020 0.5  0.3 - 1.0 K/uL Final    Eos # 11/16/2020 0.1  0.0 - 0.5 K/uL Final    Baso # 11/16/2020 0.03  0.00 - 0.20 K/uL Final    nRBC 11/16/2020 0  0 /100 WBC Final    Gran % 11/16/2020 58.7  38.0 - 73.0 % Final    Lymph % 11/16/2020 32.6  18.0 - 48.0 % Final    Mono % 11/16/2020 6.4  4.0 - 15.0 % Final    Eosinophil % 11/16/2020 1.8  0.0 - 8.0 % Final    Basophil % 11/16/2020 0.4  0.0 - 1.9 % Final    Differential Method 11/16/2020 Automated   Final    Sodium 11/16/2020 143  136 - 145 mmol/L Final     Potassium 11/16/2020 3.9  3.5 - 5.1 mmol/L Final    Chloride 11/16/2020 105  95 - 110 mmol/L Final    CO2 11/16/2020 28  23 - 29 mmol/L Final    Glucose 11/16/2020 99  70 - 110 mg/dL Final    BUN 11/16/2020 18  8 - 23 mg/dL Final    Creatinine 11/16/2020 0.8  0.5 - 1.4 mg/dL Final    Calcium 11/16/2020 9.0  8.7 - 10.5 mg/dL Final    Total Protein 11/16/2020 7.6  6.0 - 8.4 g/dL Final    Albumin 11/16/2020 3.8  3.5 - 5.2 g/dL Final    Total Bilirubin 11/16/2020 0.2  0.1 - 1.0 mg/dL Final    Alkaline Phosphatase 11/16/2020 104  55 - 135 U/L Final    AST 11/16/2020 16  10 - 40 U/L Final    ALT 11/16/2020 17  10 - 44 U/L Final    Anion Gap 11/16/2020 10  8 - 16 mmol/L Final    eGFR if African American 11/16/2020 >60.0  >60 mL/min/1.73 m^2 Final    eGFR if non African American 11/16/2020 >60.0  >60 mL/min/1.73 m^2 Final    Hemoglobin A1C 11/16/2020 5.8* 4.0 - 5.6 % Final    Estimated Avg Glucose 11/16/2020 120  68 - 131 mg/dL Final    Cholesterol 11/16/2020 203* 120 - 199 mg/dL Final    Triglycerides 11/16/2020 182* 30 - 150 mg/dL Final    HDL 11/16/2020 30* 40 - 75 mg/dL Final    LDL Cholesterol 11/16/2020 136.6  63.0 - 159.0 mg/dL Final    HDL/Cholesterol Ratio 11/16/2020 14.8* 20.0 - 50.0 % Final    Total Cholesterol/HDL Ratio 11/16/2020 6.8* 2.0 - 5.0 Final    Non-HDL Cholesterol 11/16/2020 173  mg/dL Final    PSA, Screen 11/16/2020 9.9* 0.00 - 4.00 ng/mL Final          ?   Assessment/Plan:   Anemia, unspecified type  -     Ambulatory referral/consult to Hematology / Oncology  -     Ferritin; Future; Expected date: 12/03/2020  -     Iron and TIBC; Future; Expected date: 12/03/2020  -     Vitamin B12; Future; Expected date: 12/03/2020  -     Folate; Future; Expected date: 12/03/2020  -     CBC auto differential; Future; Expected date: 06/03/2021       1. Anemia, unspecified type          Plan:     # anemia:  Mild anemia, normocytic, hemoglobin 12.5, no prior labs available to clarify duration.   It does not appear he is symptomatic from his mild anemia.  I discussed the broad differential for anemia including iron or other nutrient deficiency, anemia of chronic illness, bone marrow disorder or malignant process.  He did have follow-up with Urology earlier today for abnormal PSA and noted to have normal KHURRAM; I did discuss that cannot exclude potential malignancy however there are wrist to further evaluation and invasive biopsy; as he is asymptomatic he has opted for surveillance with Urology which is quite reasonable given his age and asymptomatic nature.  Similarly I do not feel that further invasive testing or bone marrow biopsy is appropriate at this point but can check nutritional studies and supplement if needed. Mr. Pagan and his son are in agreement plan for limited blood workup today and I will call with results and surveillance with repeat CBC in 6 months.      Follow-Up:   Patient Instructions   Labs today  RV in 6 months with cbc same day prior

## 2020-12-03 NOTE — LETTER
December 3, 2020      Shruthi Burr MD  [address] [facility] [address]           [facility]  [address] [facility] [address]  [place] [address]  Phone: [phone]  Fax: [phone]          Patient: Real Pagan   MR Number: [id number]   YOB: 1935   Date of Visit: 12/3/2020       Dear Dr. Shruthi Burr:    Thank you for referring Real Pagan to me for evaluation. Attached you will find relevant portions of my assessment and plan of care.    If you have questions, please do not hesitate to call me. I look forward to following Real Pagan along with you.    Sincerely,    Abhilash Saavedra MD    Enclosure  CC:  No Recipients    If you would like to receive this communication electronically, please contact [email or web address] or [phone] to request more information on [employer] Link access.    For providers and/or their staff who would like to refer a patient to [facility], please contact us through our one-stop-shop provider referral line, [facility], at [phone].    If you feel you have received this communication in error or would no longer like to receive these types of communications, please e-mail [email or web address]

## 2020-12-03 NOTE — LETTER
December 3, 2020      Shruthi Burr MD  [address] [facility] [address]           [facility]  [address]  [place] [address]  Phone: [phone]  Fax: [phone]          Patient: Real Pagan   MR Number: [id number]   YOB: 1935   Date of Visit: 12/3/2020       Dear Dr. Shruthi Burr:    Thank you for referring Real Pagan to me for evaluation. Attached you will find relevant portions of my assessment and plan of care.    If you have questions, please do not hesitate to call me. I look forward to following Real Pagan along with you.    Sincerely,    Lindsay Paiz MD    Enclosure  CC:  No Recipients    If you would like to receive this communication electronically, please contact [email or web address] or [phone] to request more information on [employer] Link access.    For providers and/or their staff who would like to refer a patient to [facility], please contact us through our one-stop-shop provider referral line, [facility], at [phone].    If you feel you have received this communication in error or would no longer like to receive these types of communications, please e-mail [email or web address]

## 2020-12-04 ENCOUNTER — TELEPHONE (OUTPATIENT)
Dept: HEMATOLOGY/ONCOLOGY | Facility: CLINIC | Age: 85
End: 2020-12-04

## 2020-12-04 LAB
FERRITIN SERPL-MCNC: 34 NG/ML (ref 20–300)
FOLATE SERPL-MCNC: 4.8 NG/ML (ref 4–24)
IRON SERPL-MCNC: 38 UG/DL (ref 45–160)
SATURATED IRON: 12 % (ref 20–50)
TOTAL IRON BINDING CAPACITY: 324 UG/DL (ref 250–450)
TRANSFERRIN SERPL-MCNC: 219 MG/DL (ref 200–375)
VIT B12 SERPL-MCNC: 244 PG/ML (ref 210–950)

## 2021-01-05 ENCOUNTER — OFFICE VISIT (OUTPATIENT)
Dept: HEMATOLOGY/ONCOLOGY | Facility: CLINIC | Age: 86
End: 2021-01-05
Payer: COMMERCIAL

## 2021-01-05 VITALS
HEART RATE: 66 BPM | DIASTOLIC BLOOD PRESSURE: 63 MMHG | OXYGEN SATURATION: 98 % | TEMPERATURE: 98 F | SYSTOLIC BLOOD PRESSURE: 137 MMHG | WEIGHT: 150.56 LBS | BODY MASS INDEX: 25.06 KG/M2

## 2021-01-05 DIAGNOSIS — D64.9 ANEMIA, UNSPECIFIED TYPE: Primary | ICD-10-CM

## 2021-01-05 PROCEDURE — 3288F FALL RISK ASSESSMENT DOCD: CPT | Mod: CPTII,S$GLB,, | Performed by: INTERNAL MEDICINE

## 2021-01-05 PROCEDURE — 1126F AMNT PAIN NOTED NONE PRSNT: CPT | Mod: S$GLB,,, | Performed by: INTERNAL MEDICINE

## 2021-01-05 PROCEDURE — 99213 OFFICE O/P EST LOW 20 MIN: CPT | Mod: S$GLB,,, | Performed by: INTERNAL MEDICINE

## 2021-01-05 PROCEDURE — 99213 PR OFFICE/OUTPT VISIT, EST, LEVL III, 20-29 MIN: ICD-10-PCS | Mod: S$GLB,,, | Performed by: INTERNAL MEDICINE

## 2021-01-05 PROCEDURE — 3288F PR FALLS RISK ASSESSMENT DOCUMENTED: ICD-10-PCS | Mod: CPTII,S$GLB,, | Performed by: INTERNAL MEDICINE

## 2021-01-05 PROCEDURE — 99999 PR PBB SHADOW E&M-EST. PATIENT-LVL III: ICD-10-PCS | Mod: PBBFAC,,, | Performed by: INTERNAL MEDICINE

## 2021-01-05 PROCEDURE — 1101F PT FALLS ASSESS-DOCD LE1/YR: CPT | Mod: CPTII,S$GLB,, | Performed by: INTERNAL MEDICINE

## 2021-01-05 PROCEDURE — 1126F PR PAIN SEVERITY QUANTIFIED, NO PAIN PRESENT: ICD-10-PCS | Mod: S$GLB,,, | Performed by: INTERNAL MEDICINE

## 2021-01-05 PROCEDURE — 1101F PR PT FALLS ASSESS DOC 0-1 FALLS W/OUT INJ PAST YR: ICD-10-PCS | Mod: CPTII,S$GLB,, | Performed by: INTERNAL MEDICINE

## 2021-01-05 PROCEDURE — 1159F PR MEDICATION LIST DOCUMENTED IN MEDICAL RECORD: ICD-10-PCS | Mod: S$GLB,,, | Performed by: INTERNAL MEDICINE

## 2021-01-05 PROCEDURE — 99999 PR PBB SHADOW E&M-EST. PATIENT-LVL III: CPT | Mod: PBBFAC,,, | Performed by: INTERNAL MEDICINE

## 2021-01-05 PROCEDURE — 1159F MED LIST DOCD IN RCRD: CPT | Mod: S$GLB,,, | Performed by: INTERNAL MEDICINE

## 2021-03-29 ENCOUNTER — OFFICE VISIT (OUTPATIENT)
Dept: FAMILY MEDICINE | Facility: CLINIC | Age: 86
End: 2021-03-29
Attending: FAMILY MEDICINE
Payer: COMMERCIAL

## 2021-03-29 VITALS
OXYGEN SATURATION: 97 % | BODY MASS INDEX: 24.91 KG/M2 | HEART RATE: 67 BPM | WEIGHT: 149.5 LBS | SYSTOLIC BLOOD PRESSURE: 132 MMHG | TEMPERATURE: 98 F | HEIGHT: 65 IN | DIASTOLIC BLOOD PRESSURE: 64 MMHG

## 2021-03-29 DIAGNOSIS — R73.03 PRE-DIABETES: ICD-10-CM

## 2021-03-29 DIAGNOSIS — Z72.0 TOBACCO ABUSE: ICD-10-CM

## 2021-03-29 DIAGNOSIS — E78.5 DYSLIPIDEMIA: ICD-10-CM

## 2021-03-29 DIAGNOSIS — D50.8 OTHER IRON DEFICIENCY ANEMIA: Primary | ICD-10-CM

## 2021-03-29 PROCEDURE — 99999 PR PBB SHADOW E&M-EST. PATIENT-LVL III: CPT | Mod: PBBFAC,,, | Performed by: FAMILY MEDICINE

## 2021-03-29 PROCEDURE — 1101F PR PT FALLS ASSESS DOC 0-1 FALLS W/OUT INJ PAST YR: ICD-10-PCS | Mod: CPTII,S$GLB,, | Performed by: FAMILY MEDICINE

## 2021-03-29 PROCEDURE — 1159F PR MEDICATION LIST DOCUMENTED IN MEDICAL RECORD: ICD-10-PCS | Mod: S$GLB,,, | Performed by: FAMILY MEDICINE

## 2021-03-29 PROCEDURE — 99999 PR PBB SHADOW E&M-EST. PATIENT-LVL III: ICD-10-PCS | Mod: PBBFAC,,, | Performed by: FAMILY MEDICINE

## 2021-03-29 PROCEDURE — 1101F PT FALLS ASSESS-DOCD LE1/YR: CPT | Mod: CPTII,S$GLB,, | Performed by: FAMILY MEDICINE

## 2021-03-29 PROCEDURE — 1159F MED LIST DOCD IN RCRD: CPT | Mod: S$GLB,,, | Performed by: FAMILY MEDICINE

## 2021-03-29 PROCEDURE — 99214 OFFICE O/P EST MOD 30 MIN: CPT | Mod: S$GLB,,, | Performed by: FAMILY MEDICINE

## 2021-03-29 PROCEDURE — 99214 PR OFFICE/OUTPT VISIT, EST, LEVL IV, 30-39 MIN: ICD-10-PCS | Mod: S$GLB,,, | Performed by: FAMILY MEDICINE

## 2021-03-29 PROCEDURE — 3288F PR FALLS RISK ASSESSMENT DOCUMENTED: ICD-10-PCS | Mod: CPTII,S$GLB,, | Performed by: FAMILY MEDICINE

## 2021-03-29 PROCEDURE — 3288F FALL RISK ASSESSMENT DOCD: CPT | Mod: CPTII,S$GLB,, | Performed by: FAMILY MEDICINE

## 2021-03-31 ENCOUNTER — LAB VISIT (OUTPATIENT)
Dept: LAB | Facility: HOSPITAL | Age: 86
End: 2021-03-31
Attending: FAMILY MEDICINE
Payer: COMMERCIAL

## 2021-03-31 DIAGNOSIS — R73.03 PRE-DIABETES: ICD-10-CM

## 2021-03-31 DIAGNOSIS — D50.8 OTHER IRON DEFICIENCY ANEMIA: ICD-10-CM

## 2021-03-31 DIAGNOSIS — E78.5 DYSLIPIDEMIA: ICD-10-CM

## 2021-03-31 LAB
ALBUMIN SERPL BCP-MCNC: 3.6 G/DL (ref 3.5–5.2)
ALP SERPL-CCNC: 99 U/L (ref 55–135)
ALT SERPL W/O P-5'-P-CCNC: 16 U/L (ref 10–44)
ANION GAP SERPL CALC-SCNC: 9 MMOL/L (ref 8–16)
AST SERPL-CCNC: 16 U/L (ref 10–40)
BASOPHILS # BLD AUTO: 0.02 K/UL (ref 0–0.2)
BASOPHILS NFR BLD: 0.3 % (ref 0–1.9)
BILIRUB SERPL-MCNC: 0.3 MG/DL (ref 0.1–1)
BUN SERPL-MCNC: 14 MG/DL (ref 8–23)
CALCIUM SERPL-MCNC: 8.4 MG/DL (ref 8.7–10.5)
CHLORIDE SERPL-SCNC: 106 MMOL/L (ref 95–110)
CHOLEST SERPL-MCNC: 122 MG/DL (ref 120–199)
CHOLEST/HDLC SERPL: 4.4 {RATIO} (ref 2–5)
CO2 SERPL-SCNC: 28 MMOL/L (ref 23–29)
CREAT SERPL-MCNC: 0.8 MG/DL (ref 0.5–1.4)
DIFFERENTIAL METHOD: ABNORMAL
EOSINOPHIL # BLD AUTO: 0.2 K/UL (ref 0–0.5)
EOSINOPHIL NFR BLD: 2.1 % (ref 0–8)
ERYTHROCYTE [DISTWIDTH] IN BLOOD BY AUTOMATED COUNT: 14 % (ref 11.5–14.5)
EST. GFR  (AFRICAN AMERICAN): >60 ML/MIN/1.73 M^2
EST. GFR  (NON AFRICAN AMERICAN): >60 ML/MIN/1.73 M^2
ESTIMATED AVG GLUCOSE: 120 MG/DL (ref 68–131)
FERRITIN SERPL-MCNC: 59 NG/ML (ref 20–300)
GLUCOSE SERPL-MCNC: 94 MG/DL (ref 70–110)
HBA1C MFR BLD: 5.8 % (ref 4–5.6)
HCT VFR BLD AUTO: 38.8 % (ref 40–54)
HDLC SERPL-MCNC: 28 MG/DL (ref 40–75)
HDLC SERPL: 23 % (ref 20–50)
HGB BLD-MCNC: 12 G/DL (ref 14–18)
IMM GRANULOCYTES # BLD AUTO: 0.01 K/UL (ref 0–0.04)
IMM GRANULOCYTES NFR BLD AUTO: 0.1 % (ref 0–0.5)
IRON SERPL-MCNC: 33 UG/DL (ref 45–160)
LDLC SERPL CALC-MCNC: 68.8 MG/DL (ref 63–159)
LYMPHOCYTES # BLD AUTO: 2.3 K/UL (ref 1–4.8)
LYMPHOCYTES NFR BLD: 31.9 % (ref 18–48)
MCH RBC QN AUTO: 29.1 PG (ref 27–31)
MCHC RBC AUTO-ENTMCNC: 30.9 G/DL (ref 32–36)
MCV RBC AUTO: 94 FL (ref 82–98)
MONOCYTES # BLD AUTO: 0.6 K/UL (ref 0.3–1)
MONOCYTES NFR BLD: 7.7 % (ref 4–15)
NEUTROPHILS # BLD AUTO: 4.1 K/UL (ref 1.8–7.7)
NEUTROPHILS NFR BLD: 57.9 % (ref 38–73)
NONHDLC SERPL-MCNC: 94 MG/DL
NRBC BLD-RTO: 0 /100 WBC
PLATELET # BLD AUTO: 135 K/UL (ref 150–450)
PMV BLD AUTO: 10 FL (ref 9.2–12.9)
POTASSIUM SERPL-SCNC: 3.9 MMOL/L (ref 3.5–5.1)
PROT SERPL-MCNC: 7.1 G/DL (ref 6–8.4)
RBC # BLD AUTO: 4.13 M/UL (ref 4.6–6.2)
SATURATED IRON: 12 % (ref 20–50)
SODIUM SERPL-SCNC: 143 MMOL/L (ref 136–145)
TOTAL IRON BINDING CAPACITY: 278 UG/DL (ref 250–450)
TRANSFERRIN SERPL-MCNC: 188 MG/DL (ref 200–375)
TRIGL SERPL-MCNC: 126 MG/DL (ref 30–150)
WBC # BLD AUTO: 7.11 K/UL (ref 3.9–12.7)

## 2021-03-31 PROCEDURE — 80061 LIPID PANEL: CPT | Performed by: FAMILY MEDICINE

## 2021-03-31 PROCEDURE — 36415 COLL VENOUS BLD VENIPUNCTURE: CPT | Mod: PO | Performed by: FAMILY MEDICINE

## 2021-03-31 PROCEDURE — 82728 ASSAY OF FERRITIN: CPT | Performed by: FAMILY MEDICINE

## 2021-03-31 PROCEDURE — 83540 ASSAY OF IRON: CPT | Performed by: FAMILY MEDICINE

## 2021-03-31 PROCEDURE — 85025 COMPLETE CBC W/AUTO DIFF WBC: CPT | Performed by: FAMILY MEDICINE

## 2021-03-31 PROCEDURE — 83036 HEMOGLOBIN GLYCOSYLATED A1C: CPT | Performed by: FAMILY MEDICINE

## 2021-03-31 PROCEDURE — 80053 COMPREHEN METABOLIC PANEL: CPT | Performed by: FAMILY MEDICINE

## 2021-04-12 RX ORDER — MINERAL OIL
180 ENEMA (ML) RECTAL DAILY
Qty: 30 TABLET | Refills: 11 | Status: SHIPPED | OUTPATIENT
Start: 2021-04-12 | End: 2021-08-12

## 2021-04-21 ENCOUNTER — OFFICE VISIT (OUTPATIENT)
Dept: HEMATOLOGY/ONCOLOGY | Facility: CLINIC | Age: 86
End: 2021-04-21
Payer: COMMERCIAL

## 2021-04-21 VITALS
WEIGHT: 146.63 LBS | HEART RATE: 67 BPM | OXYGEN SATURATION: 99 % | DIASTOLIC BLOOD PRESSURE: 63 MMHG | BODY MASS INDEX: 24.4 KG/M2 | SYSTOLIC BLOOD PRESSURE: 134 MMHG | TEMPERATURE: 97 F

## 2021-04-21 DIAGNOSIS — D64.9 ANEMIA, UNSPECIFIED TYPE: Primary | ICD-10-CM

## 2021-04-21 DIAGNOSIS — D69.6 THROMBOCYTOPENIA: ICD-10-CM

## 2021-04-21 PROCEDURE — 99999 PR PBB SHADOW E&M-EST. PATIENT-LVL II: CPT | Mod: PBBFAC,,, | Performed by: INTERNAL MEDICINE

## 2021-04-21 PROCEDURE — 3288F FALL RISK ASSESSMENT DOCD: CPT | Mod: CPTII,S$GLB,, | Performed by: INTERNAL MEDICINE

## 2021-04-21 PROCEDURE — 1101F PR PT FALLS ASSESS DOC 0-1 FALLS W/OUT INJ PAST YR: ICD-10-PCS | Mod: CPTII,S$GLB,, | Performed by: INTERNAL MEDICINE

## 2021-04-21 PROCEDURE — 1101F PT FALLS ASSESS-DOCD LE1/YR: CPT | Mod: CPTII,S$GLB,, | Performed by: INTERNAL MEDICINE

## 2021-04-21 PROCEDURE — 99213 OFFICE O/P EST LOW 20 MIN: CPT | Mod: S$GLB,,, | Performed by: INTERNAL MEDICINE

## 2021-04-21 PROCEDURE — 99999 PR PBB SHADOW E&M-EST. PATIENT-LVL II: ICD-10-PCS | Mod: PBBFAC,,, | Performed by: INTERNAL MEDICINE

## 2021-04-21 PROCEDURE — 1159F PR MEDICATION LIST DOCUMENTED IN MEDICAL RECORD: ICD-10-PCS | Mod: S$GLB,,, | Performed by: INTERNAL MEDICINE

## 2021-04-21 PROCEDURE — 1126F PR PAIN SEVERITY QUANTIFIED, NO PAIN PRESENT: ICD-10-PCS | Mod: S$GLB,,, | Performed by: INTERNAL MEDICINE

## 2021-04-21 PROCEDURE — 99213 PR OFFICE/OUTPT VISIT, EST, LEVL III, 20-29 MIN: ICD-10-PCS | Mod: S$GLB,,, | Performed by: INTERNAL MEDICINE

## 2021-04-21 PROCEDURE — 1126F AMNT PAIN NOTED NONE PRSNT: CPT | Mod: S$GLB,,, | Performed by: INTERNAL MEDICINE

## 2021-04-21 PROCEDURE — 3288F PR FALLS RISK ASSESSMENT DOCUMENTED: ICD-10-PCS | Mod: CPTII,S$GLB,, | Performed by: INTERNAL MEDICINE

## 2021-04-21 PROCEDURE — 1159F MED LIST DOCD IN RCRD: CPT | Mod: S$GLB,,, | Performed by: INTERNAL MEDICINE

## 2021-04-21 RX ORDER — FERROUS SULFATE 325(65) MG
325 TABLET ORAL
COMMUNITY
End: 2021-08-12

## 2021-08-12 ENCOUNTER — OFFICE VISIT (OUTPATIENT)
Dept: FAMILY MEDICINE | Facility: CLINIC | Age: 86
End: 2021-08-12
Attending: FAMILY MEDICINE
Payer: MEDICAID

## 2021-08-12 VITALS
DIASTOLIC BLOOD PRESSURE: 70 MMHG | BODY MASS INDEX: 23.47 KG/M2 | HEART RATE: 64 BPM | SYSTOLIC BLOOD PRESSURE: 130 MMHG | OXYGEN SATURATION: 99 % | HEIGHT: 65 IN | TEMPERATURE: 98 F | WEIGHT: 140.88 LBS

## 2021-08-12 DIAGNOSIS — M54.9 DORSALGIA, UNSPECIFIED: ICD-10-CM

## 2021-08-12 PROCEDURE — 99999 PR PBB SHADOW E&M-EST. PATIENT-LVL IV: ICD-10-PCS | Mod: PBBFAC,,, | Performed by: FAMILY MEDICINE

## 2021-08-12 PROCEDURE — 99214 OFFICE O/P EST MOD 30 MIN: CPT | Mod: PBBFAC,PO | Performed by: FAMILY MEDICINE

## 2021-08-12 PROCEDURE — 90471 IMMUNIZATION ADMIN: CPT | Mod: PBBFAC,PO

## 2021-08-12 PROCEDURE — 99214 OFFICE O/P EST MOD 30 MIN: CPT | Mod: 25,S$PBB,, | Performed by: FAMILY MEDICINE

## 2021-08-12 PROCEDURE — 99214 PR OFFICE/OUTPT VISIT, EST, LEVL IV, 30-39 MIN: ICD-10-PCS | Mod: 25,S$PBB,, | Performed by: FAMILY MEDICINE

## 2021-08-12 PROCEDURE — 99999 PR PBB SHADOW E&M-EST. PATIENT-LVL IV: CPT | Mod: PBBFAC,,, | Performed by: FAMILY MEDICINE

## 2021-08-12 RX ORDER — ASPIRIN 81 MG/1
81 TABLET ORAL DAILY
COMMUNITY
End: 2022-06-21

## 2021-10-26 ENCOUNTER — TELEPHONE (OUTPATIENT)
Dept: HEMATOLOGY/ONCOLOGY | Facility: CLINIC | Age: 86
End: 2021-10-26
Payer: MEDICAID

## 2021-12-02 ENCOUNTER — PATIENT OUTREACH (OUTPATIENT)
Dept: ADMINISTRATIVE | Facility: OTHER | Age: 86
End: 2021-12-02
Payer: MEDICAID

## 2021-12-03 ENCOUNTER — OFFICE VISIT (OUTPATIENT)
Dept: UROLOGY | Facility: CLINIC | Age: 86
End: 2021-12-03
Payer: MEDICAID

## 2021-12-03 VITALS
DIASTOLIC BLOOD PRESSURE: 68 MMHG | TEMPERATURE: 98 F | WEIGHT: 138.44 LBS | HEART RATE: 76 BPM | HEIGHT: 65 IN | BODY MASS INDEX: 23.07 KG/M2 | SYSTOLIC BLOOD PRESSURE: 135 MMHG

## 2021-12-03 DIAGNOSIS — Z12.5 PROSTATE CANCER SCREENING: Primary | ICD-10-CM

## 2021-12-03 PROCEDURE — 99213 OFFICE O/P EST LOW 20 MIN: CPT | Mod: PBBFAC | Performed by: UROLOGY

## 2021-12-03 PROCEDURE — 99999 PR PBB SHADOW E&M-EST. PATIENT-LVL III: ICD-10-PCS | Mod: PBBFAC,,, | Performed by: UROLOGY

## 2021-12-03 PROCEDURE — 99999 PR PBB SHADOW E&M-EST. PATIENT-LVL III: CPT | Mod: PBBFAC,,, | Performed by: UROLOGY

## 2021-12-03 PROCEDURE — 99213 PR OFFICE/OUTPT VISIT, EST, LEVL III, 20-29 MIN: ICD-10-PCS | Mod: S$PBB,,, | Performed by: UROLOGY

## 2021-12-03 PROCEDURE — 99213 OFFICE O/P EST LOW 20 MIN: CPT | Mod: S$PBB,,, | Performed by: UROLOGY

## 2021-12-03 RX ORDER — FERROUS SULFATE 325(65) MG
325 TABLET ORAL
COMMUNITY

## 2021-12-06 ENCOUNTER — TELEPHONE (OUTPATIENT)
Dept: FAMILY MEDICINE | Facility: CLINIC | Age: 86
End: 2021-12-06
Payer: MEDICAID

## 2022-05-02 ENCOUNTER — PATIENT MESSAGE (OUTPATIENT)
Dept: FAMILY MEDICINE | Facility: CLINIC | Age: 87
End: 2022-05-02
Payer: MEDICAID

## 2022-05-06 ENCOUNTER — HOSPITAL ENCOUNTER (OUTPATIENT)
Dept: RADIOLOGY | Facility: HOSPITAL | Age: 87
Discharge: HOME | End: 2022-05-06
Attending: FAMILY MEDICINE
Payer: MEDICAID

## 2022-05-06 ENCOUNTER — TELEPHONE (OUTPATIENT)
Dept: FAMILY MEDICINE | Facility: CLINIC | Age: 87
End: 2022-05-06

## 2022-05-06 ENCOUNTER — OFFICE VISIT (OUTPATIENT)
Dept: FAMILY MEDICINE | Facility: CLINIC | Age: 87
End: 2022-05-06
Attending: FAMILY MEDICINE
Payer: MEDICAID

## 2022-05-06 VITALS
TEMPERATURE: 98 F | HEIGHT: 65 IN | HEART RATE: 66 BPM | BODY MASS INDEX: 20.28 KG/M2 | OXYGEN SATURATION: 98 % | DIASTOLIC BLOOD PRESSURE: 62 MMHG | SYSTOLIC BLOOD PRESSURE: 136 MMHG | WEIGHT: 121.69 LBS

## 2022-05-06 DIAGNOSIS — R63.4 WEIGHT LOSS: Primary | ICD-10-CM

## 2022-05-06 DIAGNOSIS — R91.1 SOLITARY PULMONARY NODULE: ICD-10-CM

## 2022-05-06 DIAGNOSIS — Z72.0 TOBACCO ABUSE: ICD-10-CM

## 2022-05-06 DIAGNOSIS — R63.4 WEIGHT LOSS: ICD-10-CM

## 2022-05-06 PROCEDURE — 1160F PR REVIEW ALL MEDS BY PRESCRIBER/CLIN PHARMACIST DOCUMENTED: ICD-10-PCS | Mod: CPTII,,, | Performed by: FAMILY MEDICINE

## 2022-05-06 PROCEDURE — 99999 PR PBB SHADOW E&M-EST. PATIENT-LVL III: CPT | Mod: PBBFAC,,, | Performed by: FAMILY MEDICINE

## 2022-05-06 PROCEDURE — 99213 OFFICE O/P EST LOW 20 MIN: CPT | Mod: PBBFAC,25,PO | Performed by: FAMILY MEDICINE

## 2022-05-06 PROCEDURE — 3288F FALL RISK ASSESSMENT DOCD: CPT | Mod: CPTII,,, | Performed by: FAMILY MEDICINE

## 2022-05-06 PROCEDURE — 1159F MED LIST DOCD IN RCRD: CPT | Mod: CPTII,,, | Performed by: FAMILY MEDICINE

## 2022-05-06 PROCEDURE — 1101F PR PT FALLS ASSESS DOC 0-1 FALLS W/OUT INJ PAST YR: ICD-10-PCS | Mod: CPTII,,, | Performed by: FAMILY MEDICINE

## 2022-05-06 PROCEDURE — 1126F AMNT PAIN NOTED NONE PRSNT: CPT | Mod: CPTII,,, | Performed by: FAMILY MEDICINE

## 2022-05-06 PROCEDURE — 3288F PR FALLS RISK ASSESSMENT DOCUMENTED: ICD-10-PCS | Mod: CPTII,,, | Performed by: FAMILY MEDICINE

## 2022-05-06 PROCEDURE — 99214 OFFICE O/P EST MOD 30 MIN: CPT | Mod: S$PBB,,, | Performed by: FAMILY MEDICINE

## 2022-05-06 PROCEDURE — 99214 PR OFFICE/OUTPT VISIT, EST, LEVL IV, 30-39 MIN: ICD-10-PCS | Mod: S$PBB,,, | Performed by: FAMILY MEDICINE

## 2022-05-06 PROCEDURE — 81001 URINALYSIS AUTO W/SCOPE: CPT | Performed by: FAMILY MEDICINE

## 2022-05-06 PROCEDURE — 1126F PR PAIN SEVERITY QUANTIFIED, NO PAIN PRESENT: ICD-10-PCS | Mod: CPTII,,, | Performed by: FAMILY MEDICINE

## 2022-05-06 PROCEDURE — 71046 X-RAY EXAM CHEST 2 VIEWS: CPT | Mod: 26,,, | Performed by: RADIOLOGY

## 2022-05-06 PROCEDURE — 99999 PR PBB SHADOW E&M-EST. PATIENT-LVL III: ICD-10-PCS | Mod: PBBFAC,,, | Performed by: FAMILY MEDICINE

## 2022-05-06 PROCEDURE — 1160F RVW MEDS BY RX/DR IN RCRD: CPT | Mod: CPTII,,, | Performed by: FAMILY MEDICINE

## 2022-05-06 PROCEDURE — 1101F PT FALLS ASSESS-DOCD LE1/YR: CPT | Mod: CPTII,,, | Performed by: FAMILY MEDICINE

## 2022-05-06 PROCEDURE — 71046 X-RAY EXAM CHEST 2 VIEWS: CPT | Mod: TC,PO

## 2022-05-06 PROCEDURE — 1159F PR MEDICATION LIST DOCUMENTED IN MEDICAL RECORD: ICD-10-PCS | Mod: CPTII,,, | Performed by: FAMILY MEDICINE

## 2022-05-06 PROCEDURE — 71046 XR CHEST PA AND LATERAL: ICD-10-PCS | Mod: 26,,, | Performed by: RADIOLOGY

## 2022-05-06 RX ORDER — BENZONATATE 200 MG/1
200 CAPSULE ORAL 2 TIMES DAILY PRN
Qty: 20 CAPSULE | Refills: 0 | Status: SHIPPED | OUTPATIENT
Start: 2022-05-06 | End: 2022-05-16

## 2022-05-06 RX ORDER — ALBUTEROL SULFATE 90 UG/1
2 AEROSOL, METERED RESPIRATORY (INHALATION) EVERY 6 HOURS PRN
Qty: 18 G | Refills: 0 | Status: SHIPPED | OUTPATIENT
Start: 2022-05-06

## 2022-05-06 NOTE — PROGRESS NOTES
Subjective:       Patient ID: Real Pagan is a 87 y.o. male.    Chief Complaint: Cough    87 y old male smoker with anemia who over the last 10 m has had unintentional weight loss of  20 lbs . Also with fatigue over the last 3 m and non productive cough for 2 w. No fever , sob , night sweats, arthralgias .     Review of Systems   Constitutional: Positive for fatigue and unexpected weight change.   HENT: Negative.    Eyes: Negative.    Respiratory: Negative.    Cardiovascular: Negative.    Gastrointestinal: Negative.    Genitourinary: Negative.    Musculoskeletal: Negative.    Skin: Negative.    Hematological: Negative.        Objective:      Physical Exam  Constitutional:       General: He is not in acute distress.     Appearance: He is well-developed. He is not diaphoretic.   HENT:      Head: Normocephalic and atraumatic.      Right Ear: External ear normal.      Left Ear: External ear normal.      Nose: Nose normal.      Mouth/Throat:      Pharynx: No oropharyngeal exudate.   Eyes:      General: No scleral icterus.        Right eye: No discharge.         Left eye: No discharge.      Conjunctiva/sclera: Conjunctivae normal.      Pupils: Pupils are equal, round, and reactive to light.   Neck:      Thyroid: No thyromegaly.      Vascular: No JVD.      Trachea: No tracheal deviation.   Cardiovascular:      Rate and Rhythm: Normal rate and regular rhythm.      Heart sounds: Normal heart sounds. No murmur heard.    No friction rub. No gallop.   Pulmonary:      Effort: Pulmonary effort is normal. No respiratory distress.      Breath sounds: No stridor. Examination of the right-middle field reveals decreased breath sounds. Examination of the left-middle field reveals decreased breath sounds. Examination of the right-lower field reveals decreased breath sounds. Examination of the left-lower field reveals decreased breath sounds. Decreased breath sounds present. No wheezing or rales.   Chest:      Chest wall: No  tenderness.   Abdominal:      General: Bowel sounds are normal. There is no distension.      Palpations: Abdomen is soft.      Tenderness: There is no abdominal tenderness. There is no guarding or rebound.   Musculoskeletal:         General: No tenderness. Normal range of motion.      Cervical back: Normal range of motion and neck supple.   Lymphadenopathy:      Cervical: No cervical adenopathy.   Skin:     General: Skin is warm and dry.      Coloration: Skin is not pale.      Findings: No erythema or rash.   Neurological:      Mental Status: He is alert and oriented to person, place, and time.      Cranial Nerves: No cranial nerve deficit.      Motor: No abnormal muscle tone.      Coordination: Coordination normal.      Deep Tendon Reflexes: Reflexes are normal and symmetric. Reflexes normal.   Psychiatric:         Behavior: Behavior normal.         Thought Content: Thought content normal.         Judgment: Judgment normal.         Assessment:       1. Weight loss    2. Tobacco abuse        Plan:     Real was seen today for cough.    Diagnoses and all orders for this visit:    Weight loss  -     CBC Auto Differential; Future  -     Comprehensive Metabolic Panel; Future  -     Hemoglobin A1C; Future  -     Sedimentation rate; Future  -     X-Ray Chest PA And Lateral; Future  -     Fecal Immunochemical Test (iFOBT); Future  -     Urinalysis Microscopic  -     TSH; Future    Tobacco abuse    Other orders  -     benzonatate (TESSALON) 200 MG capsule; Take 1 capsule (200 mg total) by mouth 2 (two) times daily as needed for Cough.  -     albuterol (PROVENTIL/VENTOLIN HFA) 90 mcg/actuation inhaler; Inhale 2 puffs into the lungs every 6 (six) hours as needed.

## 2022-05-07 DIAGNOSIS — E78.5 DYSLIPIDEMIA: ICD-10-CM

## 2022-05-07 LAB
BACTERIA #/AREA URNS AUTO: ABNORMAL /HPF
CAOX CRY UR QL COMP ASSIST: ABNORMAL
HYALINE CASTS UR QL AUTO: 5 /LPF
MICROSCOPIC COMMENT: ABNORMAL
RBC #/AREA URNS AUTO: 13 /HPF (ref 0–4)
SQUAMOUS #/AREA URNS AUTO: 0 /HPF
WBC #/AREA URNS AUTO: 2 /HPF (ref 0–5)

## 2022-05-07 NOTE — TELEPHONE ENCOUNTER
Care Due:                  Date            Visit Type   Department     Provider  --------------------------------------------------------------------------------                                EP -                              PRIMARY      [facility]  Shruthi SANTIAGO  Last Visit: 05-      CARE ([facility])   MEDICINE       Leno  Next Visit: None Scheduled  None         None Found                                                            Last  Test          Frequency    Reason                     Performed    Due Date  --------------------------------------------------------------------------------    Lipid Panel.  12 months..  rosuvastatin.............  03- 03-    [employer] Embedded Care Gaps. Reference number: [id number]. 5/07/2022   9:25:01 AM CDT

## 2022-05-08 NOTE — TELEPHONE ENCOUNTER
Refill Routing Note   Medication(s) are not appropriate for processing by [facility] for the following reason(s):      - Required laboratory values are outdated    [facility] action(s):  Defer Medication-related problems identified: Requires labs     Medication Therapy Plan: CDMR.LABS(LIPIDS)  Medication reconciliation completed: No     Appointments  past 12m or future 3m with PCP    Date Provider   Last Visit   5/6/2022 Shruthi Burr MD   Next Visit   Visit date not found Shruthi Burr MD   ED visits in past 90 days: 0        Note composed:8:18 PM 05/07/2022

## 2022-05-09 ENCOUNTER — TELEPHONE (OUTPATIENT)
Dept: FAMILY MEDICINE | Facility: CLINIC | Age: 87
End: 2022-05-09
Payer: MEDICAID

## 2022-05-09 DIAGNOSIS — R31.9 HEMATURIA, UNSPECIFIED TYPE: Primary | ICD-10-CM

## 2022-05-09 RX ORDER — ROSUVASTATIN CALCIUM 10 MG/1
TABLET, COATED ORAL
Qty: 90 TABLET | Refills: 0 | Status: SHIPPED | OUTPATIENT
Start: 2022-05-09 | End: 2022-10-13

## 2022-05-10 ENCOUNTER — PATIENT MESSAGE (OUTPATIENT)
Dept: FAMILY MEDICINE | Facility: CLINIC | Age: 87
End: 2022-05-10
Payer: MEDICAID

## 2022-05-13 ENCOUNTER — PATIENT MESSAGE (OUTPATIENT)
Dept: FAMILY MEDICINE | Facility: CLINIC | Age: 87
End: 2022-05-13
Payer: MEDICAID

## 2022-05-13 ENCOUNTER — PATIENT MESSAGE (OUTPATIENT)
Dept: SMOKING CESSATION | Facility: CLINIC | Age: 87
End: 2022-05-13
Payer: MEDICAID

## 2022-05-13 DIAGNOSIS — R91.1 SOLITARY PULMONARY NODULE: ICD-10-CM

## 2022-05-24 ENCOUNTER — HOSPITAL ENCOUNTER (OUTPATIENT)
Dept: RADIOLOGY | Facility: HOSPITAL | Age: 87
Discharge: HOME | End: 2022-05-24
Attending: FAMILY MEDICINE
Payer: MEDICAID

## 2022-05-24 DIAGNOSIS — R91.1 SOLITARY PULMONARY NODULE: ICD-10-CM

## 2022-05-24 PROCEDURE — 78815 PET IMAGE W/CT SKULL-THIGH: CPT | Mod: TC

## 2022-05-24 PROCEDURE — 78815 PET IMAGE W/CT SKULL-THIGH: CPT | Mod: 26,PS,, | Performed by: RADIOLOGY

## 2022-05-24 PROCEDURE — 78815 NM PET CT ROUTINE: ICD-10-PCS | Mod: 26,PS,, | Performed by: RADIOLOGY

## 2022-05-25 ENCOUNTER — TELEPHONE (OUTPATIENT)
Dept: FAMILY MEDICINE | Facility: CLINIC | Age: 87
End: 2022-05-25
Payer: MEDICAID

## 2022-05-25 DIAGNOSIS — R91.8 LUNG MASS: Primary | ICD-10-CM

## 2022-06-21 ENCOUNTER — OFFICE VISIT (OUTPATIENT)
Dept: PULMONOLOGY | Facility: CLINIC | Age: 87
End: 2022-06-21
Payer: MEDICAID

## 2022-06-21 VITALS
WEIGHT: 113.75 LBS | HEART RATE: 84 BPM | RESPIRATION RATE: 18 BRPM | BODY MASS INDEX: 18.95 KG/M2 | DIASTOLIC BLOOD PRESSURE: 72 MMHG | HEIGHT: 65 IN | SYSTOLIC BLOOD PRESSURE: 112 MMHG

## 2022-06-21 DIAGNOSIS — R91.8 LUNG MASS: ICD-10-CM

## 2022-06-21 DIAGNOSIS — J41.0 SIMPLE CHRONIC BRONCHITIS: ICD-10-CM

## 2022-06-21 DIAGNOSIS — R91.8 MASS OF RIGHT LUNG: ICD-10-CM

## 2022-06-21 DIAGNOSIS — R05.9 COUGH: Primary | ICD-10-CM

## 2022-06-21 DIAGNOSIS — Z72.0 TOBACCO ABUSE: ICD-10-CM

## 2022-06-21 PROCEDURE — 3288F PR FALLS RISK ASSESSMENT DOCUMENTED: ICD-10-PCS | Mod: CPTII,,, | Performed by: INTERNAL MEDICINE

## 2022-06-21 PROCEDURE — 1101F PT FALLS ASSESS-DOCD LE1/YR: CPT | Mod: CPTII,,, | Performed by: INTERNAL MEDICINE

## 2022-06-21 PROCEDURE — 99205 OFFICE O/P NEW HI 60 MIN: CPT | Mod: S$PBB,,, | Performed by: INTERNAL MEDICINE

## 2022-06-21 PROCEDURE — 99999 PR PBB SHADOW E&M-EST. PATIENT-LVL IV: ICD-10-PCS | Mod: PBBFAC,,, | Performed by: INTERNAL MEDICINE

## 2022-06-21 PROCEDURE — 1159F MED LIST DOCD IN RCRD: CPT | Mod: CPTII,,, | Performed by: INTERNAL MEDICINE

## 2022-06-21 PROCEDURE — 99214 OFFICE O/P EST MOD 30 MIN: CPT | Mod: PBBFAC | Performed by: INTERNAL MEDICINE

## 2022-06-21 PROCEDURE — 1159F PR MEDICATION LIST DOCUMENTED IN MEDICAL RECORD: ICD-10-PCS | Mod: CPTII,,, | Performed by: INTERNAL MEDICINE

## 2022-06-21 PROCEDURE — 99999 PR PBB SHADOW E&M-EST. PATIENT-LVL IV: CPT | Mod: PBBFAC,,, | Performed by: INTERNAL MEDICINE

## 2022-06-21 PROCEDURE — 99205 PR OFFICE/OUTPT VISIT, NEW, LEVL V, 60-74 MIN: ICD-10-PCS | Mod: S$PBB,,, | Performed by: INTERNAL MEDICINE

## 2022-06-21 PROCEDURE — 1101F PR PT FALLS ASSESS DOC 0-1 FALLS W/OUT INJ PAST YR: ICD-10-PCS | Mod: CPTII,,, | Performed by: INTERNAL MEDICINE

## 2022-06-21 PROCEDURE — 3288F FALL RISK ASSESSMENT DOCD: CPT | Mod: CPTII,,, | Performed by: INTERNAL MEDICINE

## 2022-06-21 RX ORDER — AZITHROMYCIN 250 MG/1
TABLET, FILM COATED ORAL
Qty: 6 TABLET | Refills: 0 | Status: ON HOLD | OUTPATIENT
Start: 2022-06-21 | End: 2022-07-01 | Stop reason: ALTCHOICE

## 2022-06-21 RX ORDER — PROMETHAZINE HYDROCHLORIDE AND CODEINE PHOSPHATE 6.25; 1 MG/5ML; MG/5ML
5 SOLUTION ORAL EVERY 4 HOURS PRN
Qty: 473 ML | Refills: 0 | Status: ON HOLD | OUTPATIENT
Start: 2022-06-21 | End: 2022-07-01 | Stop reason: HOSPADM

## 2022-06-21 RX ORDER — PREDNISONE 20 MG/1
TABLET ORAL
Qty: 20 TABLET | Refills: 0 | Status: SHIPPED | OUTPATIENT
Start: 2022-06-21 | End: 2022-08-31

## 2022-06-21 RX ORDER — ALBUTEROL SULFATE 0.83 MG/ML
2.5 SOLUTION RESPIRATORY (INHALATION)
Qty: 360 ML | Refills: 11 | Status: SHIPPED | OUTPATIENT
Start: 2022-06-21 | End: 2022-10-13

## 2022-06-21 NOTE — H&P (VIEW-ONLY)
Subjective:     Patient ID: Real Pagan is a 87 y.o. male.    Chief Complaint:  Weight loss    HPI    came with patient - friend of family  Loss of 20 lbs over 4 months  Loss of appetite  Lung Nodule  He presents for evaluation and treatment of a lung mass. The patient reports that the imaging was performed to evaluate symptoms of dyspnea on exertion, non productive cough, productive cough, shortness of breath and weight loss which have been present for 4 months and are gradually worsening. Symptoms are exacerbated by walking and relieved by rest. The patient denies other associated symptoms. He has a history of 69 pack years. Enviromental - busdriver.The patient has no known exposure to tuberculosis. The patient does not have a history of cancer.    Past Medical History:   Diagnosis Date    Dyslipidemia     Iron deficiency anemia secondary to inadequate dietary iron intake     Pre-diabetes      Past Surgical History:   Procedure Laterality Date    CATARACT EXTRACTION, BILATERAL       Review of patient's allergies indicates:  No Known Allergies  Current Outpatient Medications on File Prior to Visit   Medication Sig Dispense Refill    albuterol (PROVENTIL/VENTOLIN HFA) 90 mcg/actuation inhaler Inhale 2 puffs into the lungs every 6 (six) hours as needed. 18 g 0    ferrous sulfate (FEOSOL) 325 mg (65 mg iron) Tab tablet Take 325 mg by mouth daily with breakfast.      rosuvastatin (CRESTOR) 10 MG tablet TAKE 1 TABLET(10 MG) BY MOUTH EVERY DAY 90 tablet 0    [DISCONTINUED] aspirin (ECOTRIN) 81 MG EC tablet Take 81 mg by mouth once daily.       No current facility-administered medications on file prior to visit.     Social History     Socioeconomic History    Marital status:    Tobacco Use    Smoking status: Current Every Day Smoker     Packs/day: 1.00     Years: 69.00     Pack years: 69.00     Types: Cigarettes     Start date: 1/1/1953    Smokeless tobacco: Never Used   Substance and  "Sexual Activity    Alcohol use: Yes    Drug use: Never    Sexual activity: Never     Family History   Problem Relation Age of Onset    Cancer Brother     Diabetes Daughter        Review of Systems   Constitutional: Positive for fatigue. Negative for fever.   HENT: Positive for postnasal drip, rhinorrhea and congestion.    Eyes: Negative for redness and itching.   Respiratory: Positive for cough, sputum production, shortness of breath, dyspnea on extertion, use of rescue inhaler and Paroxysmal Nocturnal Dyspnea.    Cardiovascular: Negative for chest pain, palpitations and leg swelling.   Genitourinary: Negative for difficulty urinating and hematuria.   Endocrine: Negative for cold intolerance and heat intolerance.    Skin: Negative for rash.   Gastrointestinal: Negative for nausea and abdominal pain.   Neurological: Negative for dizziness, syncope, weakness and light-headedness.   Hematological: Negative for adenopathy. Does not bruise/bleed easily.   Psychiatric/Behavioral: Negative for sleep disturbance. The patient is not nervous/anxious.        Objective:      /72   Pulse 84   Resp 18   Ht 5' 5" (1.651 m)   Wt 51.6 kg (113 lb 12.1 oz)   BMI 18.93 kg/m²   Physical Exam  Vitals and nursing note reviewed.   Constitutional:       Appearance: He is well-developed.   HENT:      Head: Normocephalic and atraumatic.      Nose: Nose normal.   Eyes:      Conjunctiva/sclera: Conjunctivae normal.      Pupils: Pupils are equal, round, and reactive to light.   Neck:      Thyroid: No thyromegaly.      Vascular: No JVD.      Trachea: No tracheal deviation.   Cardiovascular:      Rate and Rhythm: Normal rate and regular rhythm.      Heart sounds: No murmur heard.  Pulmonary:      Breath sounds: Examination of the right-lower field reveals wheezing. Examination of the left-lower field reveals wheezing. Decreased breath sounds and wheezing present. No rhonchi or rales.   Abdominal:      General: Bowel sounds are " normal.      Palpations: Abdomen is soft.   Musculoskeletal:         General: No tenderness. Normal range of motion.      Cervical back: Neck supple.   Lymphadenopathy:      Cervical: No cervical adenopathy.   Skin:     General: Skin is warm and dry.   Neurological:      Mental Status: He is alert and oriented to person, place, and time.       Personal Diagnostic Review  Lung mass - superior segment of right lower lobe     Pulmonary Studies Review 6/21/2022   SpO2 -   Height 65   Weight 1820.12   BMI (Calculated) 18.9   Predicted Distance 277.19   Predicted Distance Meters (Calculated) 413.25       NM PET CT Routine FDG  Narrative: EXAMINATION:  NM PET CT ROUTINE    CLINICAL HISTORY:  Abnormal xray - lung nodule, >= 1 cm; Solitary pulmonary nodule    TECHNIQUE:  10.6 mCi of F18-FDG was administered intravenously.  After an approximately 60 min distribution time, PET/CT images were acquired from the skull base to the mid thigh. Transmission images were acquired to correct for attenuation using a whole body low-dose CT scan without contrast.    COMPARISON:  Recent chest radiograph    FINDINGS:  Head/neck: Normal physiologic activity present.    Chest: 6.2 x 5.2 cm cavitary mass lesion within the right lung which extends to the right hilum and abuts the posteromedial pleura.  Lesion is FDG avid within SUV max of 35.  Adjacent atelectasis/airspace opacity present.  No other FDG avid pulmonary nodule appreciated.  Correlate with diagnostic CT chest imaging.    No hilar FDG avid lymph nodes.  No axillary FDG avid lymph nodes.  Mildly enlarged right paratracheal lymph no demonstrating low level activity maintaining fatty hilum.  Subcentimeter short axis more cranial right paratracheal lymph node present demonstrating a SUV max 2.7.  Subcentimeter subcarinal lymph node with low level activity.    Bilateral gynecomastia noted.    Abdomen/pelvis: Physiologic activity without concerning FDG avid focus.    Renal cysts present.   Prostate enlargement.  Left inguinal hernia containing a portion of the sigmoid colon.  No evidence of obstruction or strangulation.    Skeletal/subcutaneous structures: No FDG avid osseous lesion.  Impression: Large cavitary FDG avid right lung mass.  Malignancy versus infectious etiology..  Mediastinal lymph nodes as above.  Other findings as above.    Electronically signed by: Brian Rivera MD  Date:    05/24/2022  Time:    12:58      Office Spirometry Results:     No flowsheet data found.  Pulmonary Studies Review 6/21/2022   SpO2 -   Height 65   Weight 1820.12   BMI (Calculated) 18.9   Predicted Distance 277.19   Predicted Distance Meters (Calculated) 413.25         Assessment:            Cough  -     promethazine-codeine 6.25-10 mg/5 ml (PHENERGAN WITH CODEINE) 6.25-10 mg/5 mL syrup; Take 5 mLs by mouth every 4 (four) hours as needed for Cough.  Dispense: 473 mL; Refill: 0  -     albuterol (PROVENTIL) 2.5 mg /3 mL (0.083 %) nebulizer solution; Take 3 mLs (2.5 mg total) by nebulization every 4 to 6 hours as needed for Wheezing or Shortness of Breath.  Dispense: 360 mL; Refill: 11  -     NEBULIZER KIT (SUPPLIES) FOR HOME USE  -     NEBULIZER FOR HOME USE    Lung mass  -     Ambulatory referral/consult to Pulmonology  -     Case Request Endoscopy: Bronchoscopy    Tobacco abuse  -     Ambulatory referral/consult to Smoking Cessation Program; Future; Expected date: 06/28/2022    Simple chronic bronchitis  -     albuterol (PROVENTIL) 2.5 mg /3 mL (0.083 %) nebulizer solution; Take 3 mLs (2.5 mg total) by nebulization every 4 to 6 hours as needed for Wheezing or Shortness of Breath.  Dispense: 360 mL; Refill: 11  -     NEBULIZER KIT (SUPPLIES) FOR HOME USE  -     NEBULIZER FOR HOME USE  -     predniSONE (DELTASONE) 20 MG tablet; Prednisone 60 mg/ day for 3 days, 40 mg/day for 3 days,20 mg/ day for 3 days, (1/2 tablet )10 mg a day for 3 days.  Dispense: 20 tablet; Refill: 0  -     azithromycin (ZITHROMAX Z-CAREN)  250 MG tablet; 500 mg on day 1 (two tablets) followed by 250 mg once daily on days 2-5  Dispense: 6 tablet; Refill: 0    Mass of right lung          Outpatient Encounter Medications as of 6/21/2022   Medication Sig Dispense Refill    albuterol (PROVENTIL/VENTOLIN HFA) 90 mcg/actuation inhaler Inhale 2 puffs into the lungs every 6 (six) hours as needed. 18 g 0    ferrous sulfate (FEOSOL) 325 mg (65 mg iron) Tab tablet Take 325 mg by mouth daily with breakfast.      rosuvastatin (CRESTOR) 10 MG tablet TAKE 1 TABLET(10 MG) BY MOUTH EVERY DAY 90 tablet 0    [DISCONTINUED] aspirin (ECOTRIN) 81 MG EC tablet Take 81 mg by mouth once daily.      albuterol (PROVENTIL) 2.5 mg /3 mL (0.083 %) nebulizer solution Take 3 mLs (2.5 mg total) by nebulization every 4 to 6 hours as needed for Wheezing or Shortness of Breath. 360 mL 11    azithromycin (ZITHROMAX Z-CAREN) 250 MG tablet 500 mg on day 1 (two tablets) followed by 250 mg once daily on days 2-5 6 tablet 0    predniSONE (DELTASONE) 20 MG tablet Prednisone 60 mg/ day for 3 days, 40 mg/day for 3 days,20 mg/ day for 3 days, (1/2 tablet )10 mg a day for 3 days. 20 tablet 0    promethazine-codeine 6.25-10 mg/5 ml (PHENERGAN WITH CODEINE) 6.25-10 mg/5 mL syrup Take 5 mLs by mouth every 4 (four) hours as needed for Cough. 473 mL 0     No facility-administered encounter medications on file as of 6/21/2022.     Plan:       Requested Prescriptions     Signed Prescriptions Disp Refills    promethazine-codeine 6.25-10 mg/5 ml (PHENERGAN WITH CODEINE) 6.25-10 mg/5 mL syrup 473 mL 0     Sig: Take 5 mLs by mouth every 4 (four) hours as needed for Cough.    albuterol (PROVENTIL) 2.5 mg /3 mL (0.083 %) nebulizer solution 360 mL 11     Sig: Take 3 mLs (2.5 mg total) by nebulization every 4 to 6 hours as needed for Wheezing or Shortness of Breath.    predniSONE (DELTASONE) 20 MG tablet 20 tablet 0     Sig: Prednisone 60 mg/ day for 3 days, 40 mg/day for 3 days,20 mg/ day for 3 days,  (1/2 tablet )10 mg a day for 3 days.    azithromycin (ZITHROMAX Z-CAREN) 250 MG tablet 6 tablet 0     Si mg on day 1 (two tablets) followed by 250 mg once daily on days 2-5     Problem List Items Addressed This Visit     Mass of right lung    Tobacco abuse    Relevant Orders    Ambulatory referral/consult to Smoking Cessation Program      Other Visit Diagnoses     Cough    -  Primary    Relevant Medications    promethazine-codeine 6.25-10 mg/5 ml (PHENERGAN WITH CODEINE) 6.25-10 mg/5 mL syrup    albuterol (PROVENTIL) 2.5 mg /3 mL (0.083 %) nebulizer solution    Other Relevant Orders    NEBULIZER KIT (SUPPLIES) FOR HOME USE    NEBULIZER FOR HOME USE    Simple chronic bronchitis        Relevant Medications    albuterol (PROVENTIL) 2.5 mg /3 mL (0.083 %) nebulizer solution    predniSONE (DELTASONE) 20 MG tablet    azithromycin (ZITHROMAX Z-CAREN) 250 MG tablet    Other Relevant Orders    NEBULIZER KIT (SUPPLIES) FOR HOME USE    NEBULIZER FOR HOME USE             Follow up in about 17 days (around 2022) for Review progress.    MEDICAL DECISION MAKING: Moderate to high complexity.  Overall, the multiple problems listed are of moderate to high severity that may impact quality of life and activities of daily living. Side effects of medications, treatment plan as well as options and alternatives reviewed and discussed with patient. There was counseling of patient concerning these issues.    Total time spent in counseling and coordination of care - 60  minutes of total time spent on the encounter, which includes face to face time and non-face to face time preparing to see the patient (eg, review of tests), Obtaining and/or reviewing separately obtained history, Documenting clinical information in the electronic or other health record, Independently interpreting results (not separately reported) and communicating results to the patient/family/caregiver, or Care coordination (not separately reported).    Time was used  in discussion of prognosis, risks, benefits of treatment, instructions and compliance with regimen . Discussion with other physicians and/or health care providers - home health or for use of durable medical equipment (oxygen, nebulizers, CPAP, BiPAP) occurred.

## 2022-06-21 NOTE — PATIENT INSTRUCTIONS
PRE-BRONCHOSCOPY INSTRUCTIONS   *Arrive at  11   am / pm (2 hours before your scheduled procedure time). This arrival time will allow us time to prepare you for your bronchoscopy procedure on (day) Friday At(time)  1:00 pm   On(date) July1 , 2022  .   Check in at Endoscopy Lab desk on the 5th Floor of [facility] located at [facility] ([id number] [place] [address]  [place] [address]). You DO NOT check in on the first floor for this procedure.   Please read the following instructions carefully before your appointment.   ALL PATIENTS:   Plan to be at the hospital for approximately 4-6 hours.   You must have a responsible person who can drive you to the hospital, stay while the procedure is being done, assume responsibility for your care at discharge, and drive you home. If not, your exam will be canceled.   Please leave all valuables at home, including jewelry. You will need to bring your s license and medical insurance card.   Also, you will be responsible for any co-payment at time of registration.   You will be sedated for the procedure. Due to the sedation you will not be able to operate a vehicle or sign any legal documentation for 24 hours after exam.   Wear loose and comfortable clothing and wear socks if you are cold natured.   Bring all medications (in original containers) that you are currently taking, or a complete list.   To prepare for this test, you MAY NOT have anything to eat or drink after midnight, not even water, unless you take any necessary medications as listed in # 1 below and then a sip of water with those medications is allowed.   1 - If you take BLOOD PRESSURE, HEART, SEIZURE or PSYCHIATRIC MEDICATIONS in the morning, please take them the morning of your procedure.   Please take these medications as soon as you awaken with a small sip of water ... please make sure they are taken before you leave to come to the hospital  for your procedure.   On the day before your procedure, take all of your regular scheduled medications except for the blood thinning medications discussed below.   2 - If you are DIABETIC:   Check blood sugar levels on the morning of the exam and/or as needed if you feel hypoglycemic (low blood sugar).   DO NOT TAKE any diabetic medications (including insulin) the morning of the exam.   If your blood sugar goes below 70, you may drink 4 ounces of juice, soda or eat a piece of hard candy. Wait 15 minutes then recheck your blood sugar. If it isn't going up, you may drink another 4 ounces and contact your Primary Care doctor or our office. Please do not have any liquids within 2 hours of your arrival time.   3 - STOP BLOOD THINNING MEDICATION, Aspirin, Ibuprofen, Naproxen, etc as listed below:   Coumadin, Plavix, Aspirin, NSAIDs (nonsteroidal anti-inflammatory drugs)  and fish oil.   If on Coumadin or Plavix, notify the prescribing physician that it is being temporarily discontinued for procedure.   Coumadin must be stopped 3 days prior to exam.   Plavix, Aspirin and NSAIDs (see above) must be stopped 7 DAYS PRIOR TO EXAM.   If you are on a blood thinner not listed, please contact your physician about stop times. Such as Aggrenox, Brilinta, Effient, Pradaxa, Xaralto, etc.   Avoid Smoking for 24 hours prior to the test!   4. - High risk medications for Endoscopy procedures:    Endoscopy Pre-Procedure Nursing Call Line [phone]   For Insurance or Financial obligations, call [phone]   [facility] Endoscopy Unit Nursing Line [phone]

## 2022-06-30 NOTE — PROGRESS NOTES
Procedure instructions reviewed with pt's daughter per . Place, time, nothing to eat or drink after midnight, no chewing gum or sucking on candy, have someone to drive them home reviewed with pt's daughter. Pt's daughter verbalized understanding.

## 2022-07-01 ENCOUNTER — HOSPITAL ENCOUNTER (OUTPATIENT)
Facility: HOSPITAL | Age: 87
Discharge: HOME | End: 2022-07-01
Attending: INTERNAL MEDICINE | Admitting: INTERNAL MEDICINE
Payer: MEDICAID

## 2022-07-01 ENCOUNTER — ANESTHESIA EVENT (OUTPATIENT)
Dept: ENDOSCOPY | Facility: HOSPITAL | Age: 87
End: 2022-07-01
Payer: MEDICAID

## 2022-07-01 ENCOUNTER — ANESTHESIA (OUTPATIENT)
Dept: ENDOSCOPY | Facility: HOSPITAL | Age: 87
End: 2022-07-01
Payer: MEDICAID

## 2022-07-01 VITALS
TEMPERATURE: 98 F | WEIGHT: 113 LBS | DIASTOLIC BLOOD PRESSURE: 51 MMHG | OXYGEN SATURATION: 98 % | BODY MASS INDEX: 18.83 KG/M2 | SYSTOLIC BLOOD PRESSURE: 128 MMHG | HEIGHT: 65 IN | RESPIRATION RATE: 20 BRPM | HEART RATE: 60 BPM

## 2022-07-01 DIAGNOSIS — J98.4 CAVITATING MASS IN RIGHT LOWER LUNG LOBE: ICD-10-CM

## 2022-07-01 LAB — KOH PREP SPEC: NORMAL

## 2022-07-01 PROCEDURE — 63600175 PHARM REV CODE 636 W HCPCS: Performed by: INTERNAL MEDICINE

## 2022-07-01 PROCEDURE — 31625 BRONCHOSCOPY W/BIOPSY(S): CPT | Mod: 59,RT,, | Performed by: INTERNAL MEDICINE

## 2022-07-01 PROCEDURE — 31625 PR BRONCHOSCOPY,BIOPSY: ICD-10-PCS | Mod: 59,RT,, | Performed by: INTERNAL MEDICINE

## 2022-07-01 PROCEDURE — 88342 IMHCHEM/IMCYTCHM 1ST ANTB: CPT | Performed by: PATHOLOGY

## 2022-07-01 PROCEDURE — 31623 PR BRONCHOSCOPY,DIAGNOSTIC W BRUSH: ICD-10-PCS | Mod: 51,RT,, | Performed by: INTERNAL MEDICINE

## 2022-07-01 PROCEDURE — 88342 CHG IMMUNOCYTOCHEMISTRY: ICD-10-PCS | Mod: 26,,, | Performed by: PATHOLOGY

## 2022-07-01 PROCEDURE — 88305 TISSUE EXAM BY PATHOLOGIST: ICD-10-PCS | Mod: 26,59,, | Performed by: PATHOLOGY

## 2022-07-01 PROCEDURE — 87102 FUNGUS ISOLATION CULTURE: CPT | Performed by: INTERNAL MEDICINE

## 2022-07-01 PROCEDURE — 31623 DX BRONCHOSCOPE/BRUSH: CPT | Mod: RT | Performed by: INTERNAL MEDICINE

## 2022-07-01 PROCEDURE — 63600175 PHARM REV CODE 636 W HCPCS: Performed by: NURSE ANESTHETIST, CERTIFIED REGISTERED

## 2022-07-01 PROCEDURE — 31623 DX BRONCHOSCOPE/BRUSH: CPT | Mod: 51,RT,, | Performed by: INTERNAL MEDICINE

## 2022-07-01 PROCEDURE — 88305 TISSUE EXAM BY PATHOLOGIST: CPT | Mod: 59 | Performed by: PATHOLOGY

## 2022-07-01 PROCEDURE — 87206 SMEAR FLUORESCENT/ACID STAI: CPT | Performed by: INTERNAL MEDICINE

## 2022-07-01 PROCEDURE — 88360 TUMOR IMMUNOHISTOCHEM/MANUAL: CPT | Performed by: PATHOLOGY

## 2022-07-01 PROCEDURE — 87210 SMEAR WET MOUNT SALINE/INK: CPT | Performed by: INTERNAL MEDICINE

## 2022-07-01 PROCEDURE — 81445 SO NEO GSAP 5-50DNA/DNA&RNA: CPT | Performed by: PATHOLOGY

## 2022-07-01 PROCEDURE — 31628 PR BRONCHOSCOPY,TRANSBRONCH BIOPSY: ICD-10-PCS | Mod: RT,,, | Performed by: INTERNAL MEDICINE

## 2022-07-01 PROCEDURE — 31628 BRONCHOSCOPY/LUNG BX EACH: CPT | Mod: RT,,, | Performed by: INTERNAL MEDICINE

## 2022-07-01 PROCEDURE — 87070 CULTURE OTHR SPECIMN AEROBIC: CPT | Performed by: INTERNAL MEDICINE

## 2022-07-01 PROCEDURE — 88112 CYTOPATH CELL ENHANCE TECH: CPT | Performed by: PATHOLOGY

## 2022-07-01 PROCEDURE — 88381 MICRODISSECTION MANUAL: CPT | Performed by: PATHOLOGY

## 2022-07-01 PROCEDURE — 88305 TISSUE EXAM BY PATHOLOGIST: ICD-10-PCS | Mod: 26,,, | Performed by: PATHOLOGY

## 2022-07-01 PROCEDURE — 27200944 HC BRONCH FORCEPS DISPOSABLE: Performed by: INTERNAL MEDICINE

## 2022-07-01 PROCEDURE — 88305 TISSUE EXAM BY PATHOLOGIST: CPT | Mod: 26,59,, | Performed by: PATHOLOGY

## 2022-07-01 PROCEDURE — 88341 IMHCHEM/IMCYTCHM EA ADD ANTB: CPT | Mod: 26,,, | Performed by: PATHOLOGY

## 2022-07-01 PROCEDURE — 31628 BRONCHOSCOPY/LUNG BX EACH: CPT | Mod: RT | Performed by: INTERNAL MEDICINE

## 2022-07-01 PROCEDURE — 88305 TISSUE EXAM BY PATHOLOGIST: CPT | Performed by: PATHOLOGY

## 2022-07-01 PROCEDURE — 00520 ANES CLOSED CHEST PX NOS: CPT | Performed by: INTERNAL MEDICINE

## 2022-07-01 PROCEDURE — 87116 MYCOBACTERIA CULTURE: CPT | Performed by: INTERNAL MEDICINE

## 2022-07-01 PROCEDURE — 25000003 PHARM REV CODE 250: Performed by: NURSE ANESTHETIST, CERTIFIED REGISTERED

## 2022-07-01 PROCEDURE — 88112 CYTOPATH CELL ENHANCE TECH: CPT | Mod: 26,,, | Performed by: PATHOLOGY

## 2022-07-01 PROCEDURE — 37000008 HC ANESTHESIA 1ST 15 MINUTES: Performed by: INTERNAL MEDICINE

## 2022-07-01 PROCEDURE — 88112 PR  CYTOPATH, CELL ENHANCE TECH: ICD-10-PCS | Mod: 26,,, | Performed by: PATHOLOGY

## 2022-07-01 PROCEDURE — 88341 IMHCHEM/IMCYTCHM EA ADD ANTB: CPT | Performed by: PATHOLOGY

## 2022-07-01 PROCEDURE — 37000009 HC ANESTHESIA EA ADD 15 MINS: Performed by: INTERNAL MEDICINE

## 2022-07-01 PROCEDURE — 88305 TISSUE EXAM BY PATHOLOGIST: CPT | Mod: 26,,, | Performed by: PATHOLOGY

## 2022-07-01 PROCEDURE — 87205 SMEAR GRAM STAIN: CPT | Performed by: INTERNAL MEDICINE

## 2022-07-01 PROCEDURE — 27200946 HC BRUSH, CYTOLOGY: Performed by: INTERNAL MEDICINE

## 2022-07-01 PROCEDURE — 87015 SPECIMEN INFECT AGNT CONCNTJ: CPT | Performed by: INTERNAL MEDICINE

## 2022-07-01 PROCEDURE — 88342 IMHCHEM/IMCYTCHM 1ST ANTB: CPT | Mod: 26,,, | Performed by: PATHOLOGY

## 2022-07-01 PROCEDURE — 88341 PR IHC OR ICC EACH ADD'L SINGLE ANTIBODY  STAINPR: ICD-10-PCS | Mod: 26,,, | Performed by: PATHOLOGY

## 2022-07-01 PROCEDURE — 31625 BRONCHOSCOPY W/BIOPSY(S): CPT | Mod: 59,RT | Performed by: INTERNAL MEDICINE

## 2022-07-01 RX ORDER — ONDANSETRON 2 MG/ML
INJECTION INTRAMUSCULAR; INTRAVENOUS
Status: DISCONTINUED | OUTPATIENT
Start: 2022-07-01 | End: 2022-07-01

## 2022-07-01 RX ORDER — PROMETHAZINE HYDROCHLORIDE AND CODEINE PHOSPHATE 6.25; 1 MG/5ML; MG/5ML
5 SOLUTION ORAL EVERY 6 HOURS PRN
Qty: 240 ML | Refills: 0 | Status: SHIPPED | OUTPATIENT
Start: 2022-07-01 | End: 2022-10-13

## 2022-07-01 RX ORDER — LIDOCAINE HYDROCHLORIDE 10 MG/ML
INJECTION, SOLUTION EPIDURAL; INFILTRATION; INTRACAUDAL; PERINEURAL
Status: DISCONTINUED | OUTPATIENT
Start: 2022-07-01 | End: 2022-07-01

## 2022-07-01 RX ORDER — PROPOFOL 10 MG/ML
VIAL (ML) INTRAVENOUS
Status: DISCONTINUED | OUTPATIENT
Start: 2022-07-01 | End: 2022-07-01

## 2022-07-01 RX ORDER — ROCURONIUM BROMIDE 10 MG/ML
INJECTION, SOLUTION INTRAVENOUS
Status: DISCONTINUED | OUTPATIENT
Start: 2022-07-01 | End: 2022-07-01

## 2022-07-01 RX ORDER — EPINEPHRINE 0.1 MG/ML
INJECTION INTRAVENOUS
Status: COMPLETED | OUTPATIENT
Start: 2022-07-01 | End: 2022-07-01

## 2022-07-01 RX ADMIN — PROPOFOL 60 MG: 10 INJECTION, EMULSION INTRAVENOUS at 12:07

## 2022-07-01 RX ADMIN — SUGAMMADEX 200 MG: 100 INJECTION, SOLUTION INTRAVENOUS at 01:07

## 2022-07-01 RX ADMIN — LIDOCAINE HYDROCHLORIDE 50 MG: 10 INJECTION, SOLUTION EPIDURAL; INFILTRATION; INTRACAUDAL; PERINEURAL at 12:07

## 2022-07-01 RX ADMIN — ROCURONIUM BROMIDE 50 MG: 10 INJECTION, SOLUTION INTRAVENOUS at 12:07

## 2022-07-01 RX ADMIN — SODIUM CHLORIDE, SODIUM LACTATE, POTASSIUM CHLORIDE, AND CALCIUM CHLORIDE: .6; .31; .03; .02 INJECTION, SOLUTION INTRAVENOUS at 12:07

## 2022-07-01 RX ADMIN — ONDANSETRON 4 MG: 2 INJECTION, SOLUTION INTRAMUSCULAR; INTRAVENOUS at 01:07

## 2022-07-01 RX ADMIN — EPINEPHRINE 0.4 MG: 0.1 INJECTION, SOLUTION ENDOTRACHEAL; INTRACARDIAC; INTRAVENOUS at 01:07

## 2022-07-01 NOTE — ANESTHESIA PROCEDURE NOTES
Intubation    Date/Time: 7/1/2022 12:53 PM  Performed by: Harlan Gallo CRNA  Authorized by: Butch Tellez II, MD     Intubation:     Induction:  Intravenous    Intubated:  Postinduction    Mask Ventilation:  Easy mask    Attempts:  1    Attempted By:  CRNA    Method of Intubation:  Direct    Blade:  Katelynn 3    Laryngeal View Grade: Grade IIA - cords partially seen      Difficult Airway Encountered?: No      Complications:  None    Airway Device:  Oral endotracheal tube    Airway Device Size:  8.5    Style/Cuff Inflation:  Cuffed (inflated to minimal occlusive pressure)    Inflation Amount (mL):  8    Tube secured:  21    Secured at:  The lips    Placement Verified By:  Capnometry    Complicating Factors:  None    Findings Post-Intubation:  BS equal bilateral

## 2022-07-01 NOTE — TRANSFER OF CARE
"Anesthesia Transfer of Care Note    Patient: Real Pagan    Procedure(s) Performed: Procedure(s) (LRB):  Bronchoscopy (N/A)    Patient location: PACU    Anesthesia Type: general    Transport from OR: Transported from OR on room air with adequate spontaneous ventilation    Post pain: adequate analgesia    Post assessment: no apparent anesthetic complications    Post vital signs: stable    Level of consciousness: awake    Nausea/Vomiting: no nausea/vomiting    Complications: none    Transfer of care protocol was followed      Last vitals:   Visit Vitals  BP (!) 146/64 (BP Location: Left arm, Patient Position: Lying)   Pulse 67   Temp 36.6 °C (97.9 °F) (Temporal)   Resp 18   Ht 5' 5" (1.651 m)   Wt 51.3 kg (113 lb)   SpO2 97%   BMI 18.80 kg/m²     "

## 2022-07-01 NOTE — ANESTHESIA POSTPROCEDURE EVALUATION
Anesthesia Post Evaluation    Patient: Real Pagan    Procedure(s) Performed: Procedure(s) (LRB):  Bronchoscopy (N/A)    Final Anesthesia Type: general      Patient location during evaluation: PACU  Patient participation: Yes- Able to Participate  Level of consciousness: awake and alert  Post-procedure vital signs: reviewed and stable  Pain management: adequate  Airway patency: patent    PONV status at discharge: No PONV  Anesthetic complications: no      Respiratory status: unassisted  Hydration status: euvolemic  Follow-up not needed.          Vitals Value Taken Time   /66 07/01/22 1340   Temp 98 07/01/22 1340   Pulse 66 07/01/22 1340   Resp 12 07/01/22 1340   SpO2 98 07/01/22 1340         No case tracking events are documented in the log.      Pain/Sonja Score: No data recorded

## 2022-07-01 NOTE — PROGRESS NOTES
Preoperative  evaluation. History and physical exam reviewed.  No change in medical status from above note.  Radiographic studies reviewed.  Plan for Anesthesia reviewed.  Consent reviewed with patient. Risks and benefits of procedure reviewed. Questions asked and answered. Consent signed.

## 2022-07-01 NOTE — ANESTHESIA PREPROCEDURE EVALUATION
07/01/2022  Real Pagan is a 87 y.o., male.      Pre-op Assessment    I have reviewed the Patient Summary Reports.     I have reviewed the Nursing Notes. I have reviewed the NPO Status.   I have reviewed the Medications.     Review of Systems  Anesthesia Hx:  No problems with previous Anesthesia    Social:  Smoker    Hematology/Oncology:     Oncology Normal    -- Anemia:   EENT/Dental:EENT/Dental Normal   Cardiovascular:   hyperlipidemia    Pulmonary:  Pulmonary Normal  Chest Tumor/Mass:    Renal/:  Renal/ Normal     Hepatic/GI:  Hepatic/GI Normal    Musculoskeletal:  Musculoskeletal Normal    Neurological:  Neurology Normal    Endocrine:  Endocrine Normal    Dermatological:  Skin Normal    Psych:  Psychiatric Normal           Physical Exam  General: Well nourished    Airway:  Mallampati: II   Mouth Opening: Normal  TM Distance: Normal  Tongue: Normal  Neck ROM: Normal ROM    Dental:  Intact    Chest/Lungs:  Clear to auscultation    Heart:  Rate: Normal        Anesthesia Plan  Type of Anesthesia, risks & benefits discussed:    Anesthesia Type: Gen ETT  Intra-op Monitoring Plan: Standard ASA Monitors  Induction:  IV  Informed Consent: Informed consent signed with the Patient and all parties understand the risks and agree with anesthesia plan.  All questions answered. Patient consented to blood products? Yes  ASA Score: 2    Ready For Surgery From Anesthesia Perspective.     .

## 2022-07-01 NOTE — PLAN OF CARE
Family at b/s   Dr jimenez came to bedside to discuss findings. Vital signs stable, no patient c/o nausea/vomitting, no abdominal pain, no gi bleeding. Patient to be discharged from unit.

## 2022-07-01 NOTE — PATIENT INSTRUCTIONS
"Post Bronchoscopy instructions:    1. Rest at home for the remainder of the day. Do not drive nor operate any machinery, small appliances, or a stove for the rest of the day. Do not sign any important papers or make important decisions for 24 hours. Your judgment will not be clear. You may resume your normal activities tomorrow.   2. Do not drink alcohol or take any sedatives and narcotics for 24 hours unless prescribed by your doctor.   3. Mild throat discomfort and coughing small amounts of blood are common immediately following bronchoscopy, especially if a biopsy was performed. The discomforts should subside in 24 to 48 hours.   4. Call your physician for a temperature higher then 100°F persisting longer than 24 hours after the bronchoscopy. You may take Tylenol for control of fever and "achiness".   5. Call your doctor or go to the emergency room if you have sudden onset and increasing shortness of breath, new chest pain, and increasing amounts of bleeding from postoperative coughing.   6. You'll usually receive a follow-up appointment with the physician who performed your bronchoscopy at the time of discharge from the recovery room.     Call [phone] for any problems and follow up appointment.    Jey Scott MD  Pulmonary Medicine    "

## 2022-07-01 NOTE — INTERVAL H&P NOTE
The patient has been examined and the H&P has been reviewed:    I concur with the findings and no changes have occurred since H&P was written.    Anesthesia/Surgery risks, benefits and alternative options discussed and understood by patient/family.          Active Hospital Problems    Diagnosis  POA    Cavitating mass in right lower lung lobe [J98.4]  Yes      Resolved Hospital Problems   No resolved problems to display.

## 2022-07-01 NOTE — ANESTHESIA RELEASE NOTE
"Anesthesia Release from PACU Note    Patient: Real Pagan    Procedure(s) Performed: Procedure(s) (LRB):  Bronchoscopy (N/A)    Anesthesia type: general    Post pain: Adequate analgesia    Post assessment: no apparent anesthetic complications    Last Vitals:   Visit Vitals  BP (!) 146/64 (BP Location: Left arm, Patient Position: Lying)   Pulse 67   Temp 36.6 °C (97.9 °F) (Temporal)   Resp 18   Ht 5' 5" (1.651 m)   Wt 51.3 kg (113 lb)   SpO2 97%   BMI 18.80 kg/m²       Post vital signs: stable    Level of consciousness: awake    Nausea/Vomiting: no nausea/no vomiting    Complications: none    Airway Patency: patent    Respiratory: unassisted    Cardiovascular: stable and blood pressure at baseline    Hydration: euvolemic  "

## 2022-07-01 NOTE — DISCHARGE SUMMARY
[facility] (Hospital)  Discharge Note  Short Stay    Procedure(s) (LRB):  Bronchoscopy (N/A)    OUTCOME: Patient tolerated treatment/procedure well without complication and is now ready for discharge.    DISPOSITION: Home or Self Care    FINAL DIAGNOSIS:  Right lower lobe lung mass    FOLLOWUP: In clinic on 7/11/2022    DISCHARGE INSTRUCTIONS:  No discharge procedures on file.     TIME SPENT ON DISCHARGE: 15 minutes

## 2022-07-04 LAB
BACTERIA SPEC AEROBE CULT: NO GROWTH
GRAM STN SPEC: NORMAL

## 2022-07-07 LAB
FINAL PATHOLOGIC DIAGNOSIS: ABNORMAL
Lab: ABNORMAL

## 2022-07-11 ENCOUNTER — TELEPHONE (OUTPATIENT)
Dept: PULMONOLOGY | Facility: CLINIC | Age: 87
End: 2022-07-11
Payer: MEDICAID

## 2022-07-11 ENCOUNTER — OFFICE VISIT (OUTPATIENT)
Dept: PULMONOLOGY | Facility: CLINIC | Age: 87
End: 2022-07-11
Payer: MEDICAID

## 2022-07-11 VITALS
HEIGHT: 65 IN | RESPIRATION RATE: 20 BRPM | DIASTOLIC BLOOD PRESSURE: 60 MMHG | BODY MASS INDEX: 18.59 KG/M2 | SYSTOLIC BLOOD PRESSURE: 118 MMHG | OXYGEN SATURATION: 98 % | HEART RATE: 83 BPM | WEIGHT: 111.56 LBS

## 2022-07-11 DIAGNOSIS — C34.91 BRONCHOGENIC CANCER OF RIGHT LUNG: Primary | ICD-10-CM

## 2022-07-11 PROCEDURE — 99999 PR PBB SHADOW E&M-EST. PATIENT-LVL V: CPT | Mod: PBBFAC,,, | Performed by: INTERNAL MEDICINE

## 2022-07-11 PROCEDURE — 1101F PT FALLS ASSESS-DOCD LE1/YR: CPT | Mod: CPTII,,, | Performed by: INTERNAL MEDICINE

## 2022-07-11 PROCEDURE — 3288F PR FALLS RISK ASSESSMENT DOCUMENTED: ICD-10-PCS | Mod: CPTII,,, | Performed by: INTERNAL MEDICINE

## 2022-07-11 PROCEDURE — 99214 OFFICE O/P EST MOD 30 MIN: CPT | Mod: S$PBB,,, | Performed by: INTERNAL MEDICINE

## 2022-07-11 PROCEDURE — 99215 OFFICE O/P EST HI 40 MIN: CPT | Mod: PBBFAC | Performed by: INTERNAL MEDICINE

## 2022-07-11 PROCEDURE — 3288F FALL RISK ASSESSMENT DOCD: CPT | Mod: CPTII,,, | Performed by: INTERNAL MEDICINE

## 2022-07-11 PROCEDURE — 1101F PR PT FALLS ASSESS DOC 0-1 FALLS W/OUT INJ PAST YR: ICD-10-PCS | Mod: CPTII,,, | Performed by: INTERNAL MEDICINE

## 2022-07-11 PROCEDURE — 99214 PR OFFICE/OUTPT VISIT, EST, LEVL IV, 30-39 MIN: ICD-10-PCS | Mod: S$PBB,,, | Performed by: INTERNAL MEDICINE

## 2022-07-11 PROCEDURE — 99999 PR PBB SHADOW E&M-EST. PATIENT-LVL V: ICD-10-PCS | Mod: PBBFAC,,, | Performed by: INTERNAL MEDICINE

## 2022-07-11 NOTE — PROGRESS NOTES
Subjective:     Patient ID: Real Pagan is a 87 y.o. male.    Chief Complaint:      HPI    Follow up for bronchoscopy   Follow up for bronchoscopy - positive for lung cancer   came with patient - friend of family  Loss of 20 lbs over 4 months  Loss of appetite  Lung Nodule  He presents for evaluation and treatment of a lung mass. The patient reports that the imaging was performed to evaluate symptoms of dyspnea on exertion, non productive cough, productive cough, shortness of breath and weight loss which have been present for 4 months and are gradually worsening. Symptoms are exacerbated by walking and relieved by rest. The patient denies other associated symptoms. He has a history of 69 pack years. Enviromental - busdriver.The patient has no known exposure to tuberculosis.     Past Medical History:   Diagnosis Date    Dyslipidemia     Iron deficiency anemia secondary to inadequate dietary iron intake     Pre-diabetes      Past Surgical History:   Procedure Laterality Date    BRONCHOSCOPY N/A 7/1/2022    Procedure: Bronchoscopy;  Surgeon: Jey Scott MD;  Location: [facility];  Service: Endoscopy;  Laterality: N/A;    CATARACT EXTRACTION, BILATERAL       Review of patient's allergies indicates:  No Known Allergies  Current Outpatient Medications on File Prior to Visit   Medication Sig Dispense Refill    albuterol (PROVENTIL) 2.5 mg /3 mL (0.083 %) nebulizer solution Take 3 mLs (2.5 mg total) by nebulization every 4 to 6 hours as needed for Wheezing or Shortness of Breath. 360 mL 11    albuterol (PROVENTIL/VENTOLIN HFA) 90 mcg/actuation inhaler Inhale 2 puffs into the lungs every 6 (six) hours as needed. 18 g 0    ferrous sulfate (FEOSOL) 325 mg (65 mg iron) Tab tablet Take 325 mg by mouth daily with breakfast.      predniSONE (DELTASONE) 20 MG tablet Prednisone 60 mg/ day for 3 days, 40 mg/day for 3 days,20 mg/ day for 3 days, (1/2 tablet )10 mg a day for 3 days. (Patient not taking:  "Reported on 7/18/2022) 20 tablet 0    promethazine-codeine 6.25-10 mg/5 ml (PHENERGAN WITH CODEINE) 6.25-10 mg/5 mL syrup Take 5 mLs by mouth every 6 (six) hours as needed for Cough. 240 mL 0    rosuvastatin (CRESTOR) 10 MG tablet TAKE 1 TABLET(10 MG) BY MOUTH EVERY DAY 90 tablet 0     No current facility-administered medications on file prior to visit.     Social History     Socioeconomic History    Marital status:    Tobacco Use    Smoking status: Current Every Day Smoker     Packs/day: 0.25     Years: 69.00     Pack years: 17.25     Types: Cigarettes     Start date: 1/1/1953    Smokeless tobacco: Never Used   Substance and Sexual Activity    Alcohol use: Yes    Drug use: Never    Sexual activity: Never     Family History   Problem Relation Age of Onset    Cancer Brother     Diabetes Daughter        Review of Systems   Constitutional: Positive for weight loss and fatigue.   HENT: Positive for postnasal drip.    Respiratory: Positive for shortness of breath and dyspnea on extertion.    Musculoskeletal: Positive for arthralgias.       Objective:      /60   Pulse 83   Resp 20   Ht 5' 5" (1.651 m)   Wt 50.6 kg (111 lb 8.8 oz)   SpO2 98%   BMI 18.56 kg/m²   Physical Exam  Vitals and nursing note reviewed.   Constitutional:       Appearance: He is well-developed. He is ill-appearing.   HENT:      Head: Normocephalic and atraumatic.      Nose: Nose normal.   Eyes:      Conjunctiva/sclera: Conjunctivae normal.      Pupils: Pupils are equal, round, and reactive to light.   Neck:      Thyroid: No thyromegaly.      Vascular: No JVD.      Trachea: No tracheal deviation.   Cardiovascular:      Rate and Rhythm: Normal rate and regular rhythm.      Heart sounds: No murmur heard.  Pulmonary:      Breath sounds: Examination of the right-lower field reveals wheezing. Examination of the left-lower field reveals wheezing. Decreased breath sounds and wheezing present. No rhonchi or rales.   Abdominal:      " General: Bowel sounds are normal.      Palpations: Abdomen is soft.   Musculoskeletal:         General: No tenderness. Normal range of motion.      Cervical back: Neck supple.   Lymphadenopathy:      Cervical: No cervical adenopathy.   Skin:     General: Skin is warm and dry.   Neurological:      Mental Status: He is alert and oriented to person, place, and time.       Personal Diagnostic Review  Bronchial biopsies and cytology reviewed    07/01/22 1334   Result status: Final   Resulting lab: [facility]   Value:  Abnormal   1. Lung, right lower lobe, bronchial brushing (6 smears and 1 thin prep):   -  Positive  for non-small cell carcinoma, favor squamous cell carcinoma   - Please correlate with concurrent surgical pathology biopsy (SPW-)   for further classification and molecular studies   2. Lung, right lower lobe, bronchial washing for cytology (thin prep and cell   block):   - Very scantly cellular specimen with single focus of atypical degenerating   cells, suspicious for malignancy   - Completely acellular cellblock, not amenable for further   immunohistochemical workup     Comment: Interp By Holly Strong M.D., Signed on 07/07/2022 at 08:19       Pulmonary Studies Review 7/18/2022   SpO2 98   Height 65   Weight 1771.2   BMI (Calculated) 18.4   Predicted Distance 280   Predicted Distance Meters (Calculated) 415.69       X-Ray Chest AP Portable  Narrative: EXAMINATION:  XR CHEST AP PORTABLE    CLINICAL HISTORY:  post bronch;.    TECHNIQUE:  Single frontal portable view of the chest was performed.    COMPARISON:  PET-CT 05/24/2022    FINDINGS:  No pneumothorax status post bronchoscopy.  Generalized interstitial coarsening.  Right lower lobe cavitary lesion not well visualized.  Heart normal size.  No pleural effusion.  Impression: No acute abnormality.    Electronically signed by: Hari Acosta  Date:    07/01/2022  Time:    14:24      Office Spirometry Results:     No  flowsheet data found.  Pulmonary Studies Review 7/18/2022   SpO2 98   Height 65   Weight 1771.2   BMI (Calculated) 18.4   Predicted Distance 280   Predicted Distance Meters (Calculated) 415.69         Assessment:            Bronchogenic cancer of right lung  -     Ambulatory referral/consult to Radiation Oncology; Future; Expected date: 07/18/2022  -     Ambulatory referral/consult to Hematology / Oncology; Future; Expected date: 07/18/2022  -     X-Ray Chest 4 Or More View; Future; Expected date: 07/11/2022          Outpatient Encounter Medications as of 7/11/2022   Medication Sig Dispense Refill    albuterol (PROVENTIL) 2.5 mg /3 mL (0.083 %) nebulizer solution Take 3 mLs (2.5 mg total) by nebulization every 4 to 6 hours as needed for Wheezing or Shortness of Breath. 360 mL 11    albuterol (PROVENTIL/VENTOLIN HFA) 90 mcg/actuation inhaler Inhale 2 puffs into the lungs every 6 (six) hours as needed. 18 g 0    ferrous sulfate (FEOSOL) 325 mg (65 mg iron) Tab tablet Take 325 mg by mouth daily with breakfast.      predniSONE (DELTASONE) 20 MG tablet Prednisone 60 mg/ day for 3 days, 40 mg/day for 3 days,20 mg/ day for 3 days, (1/2 tablet )10 mg a day for 3 days. (Patient not taking: Reported on 7/18/2022) 20 tablet 0    promethazine-codeine 6.25-10 mg/5 ml (PHENERGAN WITH CODEINE) 6.25-10 mg/5 mL syrup Take 5 mLs by mouth every 6 (six) hours as needed for Cough. 240 mL 0    rosuvastatin (CRESTOR) 10 MG tablet TAKE 1 TABLET(10 MG) BY MOUTH EVERY DAY 90 tablet 0     No facility-administered encounter medications on file as of 7/11/2022.     Plan:       Requested Prescriptions      No prescriptions requested or ordered in this encounter     Problem List Items Addressed This Visit     Bronchogenic cancer of right lung - Primary    Relevant Orders    Ambulatory referral/consult to Radiation Oncology    Ambulatory referral/consult to Hematology / Oncology    X-Ray Chest 4 Or More View             Follow up in about 6  months (around 1/11/2023) for Review CXR - on return.    MEDICAL DECISION MAKING: Moderate to high complexity.  Overall, the multiple problems listed are of moderate to high severity that may impact quality of life and activities of daily living. Side effects of medications, treatment plan as well as options and alternatives reviewed and discussed with patient. There was counseling of patient concerning these issues.    Total time spent in counseling and coordination of care - 30  minutes of total time spent on the encounter, which includes face to face time and non-face to face time preparing to see the patient (eg, review of tests), Obtaining and/or reviewing separately obtained history, Documenting clinical information in the electronic or other health record, Independently interpreting results (not separately reported) and communicating results to the patient/family/caregiver, or Care coordination (not separately reported).    Time was used in discussion of prognosis, risks, benefits of treatment, instructions and compliance with regimen . Discussion with other physicians and/or health care providers - home health or for use of durable medical equipment (oxygen, nebulizers, CPAP, BiPAP) occurred.

## 2022-07-11 NOTE — TELEPHONE ENCOUNTER
Can you please schedule this pt referred by Dr. Tyler faulkner.      They requested that the pt grandson Jaciel [phone] and family friend Bhavya [phone] be called concerning the appts.    Thank you!

## 2022-07-12 ENCOUNTER — TELEPHONE (OUTPATIENT)
Dept: HEMATOLOGY/ONCOLOGY | Facility: CLINIC | Age: 87
End: 2022-07-12
Payer: MEDICAID

## 2022-07-12 NOTE — TELEPHONE ENCOUNTER
Received referral in oncology work queue from Dr. Scott for lung cancer  Spoke with pt family member Bhavya as she assists with appts for pt . Requesting same day appt as rad/onc   Together we set up dr Paiz for 7/18/22 at the [facility] for 8 am. Travel directions and appt details reviewed with Bhavya and she stated understanding. She refused , however I explained that for initial visit it was preferred to have  (at least on language line ) present .  She stated understanding.  Needs language line for  for first visit placed in appt comment  They know to call back with any further needs meanwhile

## 2022-07-18 ENCOUNTER — OFFICE VISIT (OUTPATIENT)
Dept: HEMATOLOGY/ONCOLOGY | Facility: CLINIC | Age: 87
End: 2022-07-18
Payer: MEDICAID

## 2022-07-18 ENCOUNTER — OFFICE VISIT (OUTPATIENT)
Dept: RADIATION ONCOLOGY | Facility: CLINIC | Age: 87
End: 2022-07-18
Payer: MEDICAID

## 2022-07-18 VITALS
TEMPERATURE: 98 F | OXYGEN SATURATION: 98 % | WEIGHT: 110.69 LBS | HEART RATE: 76 BPM | DIASTOLIC BLOOD PRESSURE: 70 MMHG | RESPIRATION RATE: 18 BRPM | HEIGHT: 65 IN | SYSTOLIC BLOOD PRESSURE: 134 MMHG | BODY MASS INDEX: 18.44 KG/M2

## 2022-07-18 VITALS
TEMPERATURE: 98 F | WEIGHT: 110.69 LBS | BODY MASS INDEX: 18.44 KG/M2 | OXYGEN SATURATION: 97 % | SYSTOLIC BLOOD PRESSURE: 113 MMHG | DIASTOLIC BLOOD PRESSURE: 66 MMHG | HEART RATE: 83 BPM | HEIGHT: 65 IN

## 2022-07-18 DIAGNOSIS — C34.91 BRONCHOGENIC CANCER OF RIGHT LUNG: Primary | ICD-10-CM

## 2022-07-18 DIAGNOSIS — C34.91 BRONCHOGENIC CANCER OF RIGHT LUNG: ICD-10-CM

## 2022-07-18 DIAGNOSIS — R64 CANCER CACHEXIA: ICD-10-CM

## 2022-07-18 DIAGNOSIS — C34.91 SQUAMOUS CELL CARCINOMA OF RIGHT LUNG: Primary | ICD-10-CM

## 2022-07-18 DIAGNOSIS — Z72.0 CURRENT TOBACCO USE: ICD-10-CM

## 2022-07-18 DIAGNOSIS — D50.0 IRON DEFICIENCY ANEMIA DUE TO CHRONIC BLOOD LOSS: ICD-10-CM

## 2022-07-18 DIAGNOSIS — R63.0 ANOREXIA: ICD-10-CM

## 2022-07-18 PROCEDURE — 99213 OFFICE O/P EST LOW 20 MIN: CPT | Mod: PBBFAC,27 | Performed by: INTERNAL MEDICINE

## 2022-07-18 PROCEDURE — 99214 OFFICE O/P EST MOD 30 MIN: CPT | Mod: PBBFAC | Performed by: RADIOLOGY

## 2022-07-18 PROCEDURE — 3288F PR FALLS RISK ASSESSMENT DOCUMENTED: ICD-10-PCS | Mod: CPTII,,, | Performed by: INTERNAL MEDICINE

## 2022-07-18 PROCEDURE — 3288F FALL RISK ASSESSMENT DOCD: CPT | Mod: CPTII,,, | Performed by: INTERNAL MEDICINE

## 2022-07-18 PROCEDURE — 1126F PR PAIN SEVERITY QUANTIFIED, NO PAIN PRESENT: ICD-10-PCS | Mod: CPTII,,, | Performed by: INTERNAL MEDICINE

## 2022-07-18 PROCEDURE — 3288F FALL RISK ASSESSMENT DOCD: CPT | Mod: CPTII,,, | Performed by: RADIOLOGY

## 2022-07-18 PROCEDURE — 99999 PR PBB SHADOW E&M-EST. PATIENT-LVL IV: CPT | Mod: PBBFAC,,, | Performed by: RADIOLOGY

## 2022-07-18 PROCEDURE — 1126F PR PAIN SEVERITY QUANTIFIED, NO PAIN PRESENT: ICD-10-PCS | Mod: CPTII,,, | Performed by: RADIOLOGY

## 2022-07-18 PROCEDURE — 3288F PR FALLS RISK ASSESSMENT DOCUMENTED: ICD-10-PCS | Mod: CPTII,,, | Performed by: RADIOLOGY

## 2022-07-18 PROCEDURE — 1101F PT FALLS ASSESS-DOCD LE1/YR: CPT | Mod: CPTII,,, | Performed by: RADIOLOGY

## 2022-07-18 PROCEDURE — 99205 PR OFFICE/OUTPT VISIT, NEW, LEVL V, 60-74 MIN: ICD-10-PCS | Mod: S$PBB,,, | Performed by: RADIOLOGY

## 2022-07-18 PROCEDURE — 1160F PR REVIEW ALL MEDS BY PRESCRIBER/CLIN PHARMACIST DOCUMENTED: ICD-10-PCS | Mod: CPTII,,, | Performed by: INTERNAL MEDICINE

## 2022-07-18 PROCEDURE — 1126F AMNT PAIN NOTED NONE PRSNT: CPT | Mod: CPTII,,, | Performed by: INTERNAL MEDICINE

## 2022-07-18 PROCEDURE — 99999 PR PBB SHADOW E&M-EST. PATIENT-LVL III: ICD-10-PCS | Mod: PBBFAC,,, | Performed by: INTERNAL MEDICINE

## 2022-07-18 PROCEDURE — 1101F PT FALLS ASSESS-DOCD LE1/YR: CPT | Mod: CPTII,,, | Performed by: INTERNAL MEDICINE

## 2022-07-18 PROCEDURE — 99999 PR PBB SHADOW E&M-EST. PATIENT-LVL III: CPT | Mod: PBBFAC,,, | Performed by: INTERNAL MEDICINE

## 2022-07-18 PROCEDURE — 99215 PR OFFICE/OUTPT VISIT, EST, LEVL V, 40-54 MIN: ICD-10-PCS | Mod: S$PBB,,, | Performed by: INTERNAL MEDICINE

## 2022-07-18 PROCEDURE — 1160F RVW MEDS BY RX/DR IN RCRD: CPT | Mod: CPTII,,, | Performed by: INTERNAL MEDICINE

## 2022-07-18 PROCEDURE — 1101F PR PT FALLS ASSESS DOC 0-1 FALLS W/OUT INJ PAST YR: ICD-10-PCS | Mod: CPTII,,, | Performed by: RADIOLOGY

## 2022-07-18 PROCEDURE — 99215 OFFICE O/P EST HI 40 MIN: CPT | Mod: S$PBB,,, | Performed by: INTERNAL MEDICINE

## 2022-07-18 PROCEDURE — 1101F PR PT FALLS ASSESS DOC 0-1 FALLS W/OUT INJ PAST YR: ICD-10-PCS | Mod: CPTII,,, | Performed by: INTERNAL MEDICINE

## 2022-07-18 PROCEDURE — 1159F PR MEDICATION LIST DOCUMENTED IN MEDICAL RECORD: ICD-10-PCS | Mod: CPTII,,, | Performed by: INTERNAL MEDICINE

## 2022-07-18 PROCEDURE — 99999 PR PBB SHADOW E&M-EST. PATIENT-LVL IV: ICD-10-PCS | Mod: PBBFAC,,, | Performed by: RADIOLOGY

## 2022-07-18 PROCEDURE — 1126F AMNT PAIN NOTED NONE PRSNT: CPT | Mod: CPTII,,, | Performed by: RADIOLOGY

## 2022-07-18 PROCEDURE — 1159F MED LIST DOCD IN RCRD: CPT | Mod: CPTII,,, | Performed by: INTERNAL MEDICINE

## 2022-07-18 PROCEDURE — 99205 OFFICE O/P NEW HI 60 MIN: CPT | Mod: S$PBB,,, | Performed by: RADIOLOGY

## 2022-07-18 RX ORDER — SODIUM CHLORIDE 0.9 % (FLUSH) 0.9 %
10 SYRINGE (ML) INJECTION
Status: CANCELLED | OUTPATIENT
Start: 2022-08-09

## 2022-07-18 RX ORDER — SODIUM CHLORIDE 0.9 % (FLUSH) 0.9 %
10 SYRINGE (ML) INJECTION
Status: CANCELLED | OUTPATIENT
Start: 2022-07-18

## 2022-07-18 RX ORDER — HEPARIN 100 UNIT/ML
500 SYRINGE INTRAVENOUS
Status: CANCELLED | OUTPATIENT
Start: 2022-08-09

## 2022-07-18 RX ORDER — HEPARIN 100 UNIT/ML
500 SYRINGE INTRAVENOUS
Status: CANCELLED | OUTPATIENT
Start: 2022-07-18

## 2022-07-18 NOTE — PLAN OF CARE
START ON PATHWAY REGIMEN - Non-Small Cell Lung    [id number]        Paclitaxel (Taxol)       Carboplatin           Additional Orders: * All AUC calculations intended to be used in [facility]   formula    **Always confirm dose/schedule in your pharmacy ordering system**    Patient Characteristics:  Preoperative or Nonsurgical Candidate (Clinical Staging), Stage III -   Nonsurgical Candidate (Nonsquamous and Squamous), PS = 0, 1  Therapeutic Status: Preoperative or Nonsurgical Candidate (Clinical Staging)  AJCC T Category: cT3  AJCC N Category: cN2  AJCC M Category: cM0  AJCC 8 Stage Grouping: IIIB  ECOG Performance Status: 1  Intent of Therapy:  Curative Intent, Discussed with Patient

## 2022-07-18 NOTE — PROGRESS NOTES
Subjective:      DATE OF VISIT: 7/18/22     ?  Patient ID:?Real Pagan is a 87 y.o. male.?? MR#: [id number]   ?   REFERRING PROVIDER: Jey Scott MD  [address] [place] [address]     ? Primary Care Providers:  Shruthi Burr MD, MD (General)     CHIEF COMPLAINT: ?Referral for newly diagnosed lung cancer  ?   ONCOLOGIC DIAGNOSIS: Right lung squamous cell carcinoma [id number], stage IIIB  ?   CURRENT TREATMENT:  Plan for concurrent chemoradiation, see below    PAST TREATMENT: Biopsy  ?   ONCOLOGIC HISTORY:   5/6/22: Pt presented to PCP with complaints of unintentional weight loss, fatigue, non-productive cough, dyspnea on exertion, and shortness of breath. CXR demonstrated cavitary mass-like density seen projecting over the right hilar region.    5/24/22: PET CT demonstrated 6.2cm cavitary mass in right lung extending to right hilum and abutting pleura, avid max SUV 35, mildly enlarged right paratracheal node low level uptake max SUV 2.7, no distant metastases noted.    7/1/22: bronchoscopy - right lower lobe transbronchial and bronchial biopsies positive for squamous cell carcinoma  ?   Oncology History    No history exists.     Cancer Staging  Right lung squamous cell carcinoma [id number], stage IIIB    HPI:  Real Pagan is a 86yo male patient with a long smoking history (69 pack years), who presents for management of newly diagnosed Stage IIIB squamous cell carcinoma of the lung. He has had a 50lb weight loss in the past 5 months, and complains of fatigue, chills, dyspnea on exertion, shortness of breath, and a non-productive cough. He denies hemoptysis, chest pain, dysphagia/odynophagia, or any other symptoms.    Review of Systems   Constitutional: Positive for activity change, chills, fatigue and unexpected weight change.   HENT: Negative.    Eyes: Negative.    Respiratory: Positive for cough, shortness of breath and wheezing.    Cardiovascular: Negative.     Gastrointestinal: Negative.    Endocrine: Negative.    Genitourinary: Negative.    Musculoskeletal: Negative.    Skin: Negative.    Allergic/Immunologic: Negative.    Neurological: Negative.    Hematological: Negative.    Psychiatric/Behavioral: Negative.        A comprehensive 14-point review of systems was reviewed with patient and was negative other than as specified above.   ?   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     PAST SURGICAL HISTORY:   Past Surgical History:   Procedure Laterality Date    BRONCHOSCOPY N/A 7/1/2022    Procedure: Bronchoscopy;  Surgeon: Jey Scott MD;  Location: [facility];  Service: Endoscopy;  Laterality: N/A;    CATARACT EXTRACTION, BILATERAL        ?   ALLERGIES:   Allergies as of 07/18/2022    (No Known Allergies)      ?   MEDICATIONS:?   Outpatient Medications Marked as Taking for the 7/18/22 encounter (Office Visit) with Lindsay Paiz MD   Medication Sig Dispense Refill    albuterol (PROVENTIL) 2.5 mg /3 mL (0.083 %) nebulizer solution Take 3 mLs (2.5 mg total) by nebulization every 4 to 6 hours as needed for Wheezing or Shortness of Breath. 360 mL 11    albuterol (PROVENTIL/VENTOLIN HFA) 90 mcg/actuation inhaler Inhale 2 puffs into the lungs every 6 (six) hours as needed. 18 g 0    ferrous sulfate (FEOSOL) 325 mg (65 mg iron) Tab tablet Take 325 mg by mouth daily with breakfast.      predniSONE (DELTASONE) 20 MG tablet Prednisone 60 mg/ day for 3 days, 40 mg/day for 3 days,20 mg/ day for 3 days, (1/2 tablet )10 mg a day for 3 days. (Patient not taking: Reported on 7/18/2022) 20 tablet 0    promethazine-codeine 6.25-10 mg/5 ml (PHENERGAN WITH CODEINE) 6.25-10 mg/5 mL syrup Take 5 mLs by mouth every 6 (six) hours as needed for Cough. 240 mL 0    rosuvastatin (CRESTOR) 10 MG tablet TAKE 1 TABLET(10 MG) BY MOUTH EVERY DAY 90  tablet 0      ?   SOCIAL HISTORY:?   Social History     Tobacco Use    Smoking status: Current Every Day Smoker     Packs/day: 0.25     Years: 69.00     Pack years: 17.25     Types: Cigarettes     Start date: 1/1/1953    Smokeless tobacco: Never Used   Substance Use Topics    Alcohol use: Yes      FAMILY HISTORY:   family history includes Cancer in his brother; Diabetes in his daughter.   ?     Objective:      Vitals:    07/18/22 0755   BP: 113/66   Pulse: 83   Temp: 97.5 °F (36.4 °C)      ?   ECOG:?2  General appearance: Ill-appearing, in no acute distress.   Head, eyes, ears, nose, and throat: Pupils round and equally reactive to light. Oropharynx clear with moist mucous membranes.   Cardiovascular: Regular rate and rhythm, S1, S2, no audible murmurs.   Respiratory: Dullness and decreased breath sounds in right middle lobe.   Abdomen: Bowel sounds present, soft, nontender, nondistended.   Extremities: Warm, without edema.   Neurologic: Alert and oriented. Grossly normal strength, coordination, and gait.   Skin: No rashes, ecchymoses or petechial lesion.   ?  ?   Laboratory:  ?   No visits with results within 1 Day(s) from this visit.   Latest known visit with results is:   Admission on 07/01/2022, Discharged on 07/01/2022   Component Date Value Ref Range Status    Final Pathologic Diagnosis 07/01/2022    Final                    Value:1. AND 2. RIGHT LOWER LOBE TRANSBRONCHIAL AND BRONCHIAL BIOPSIES:  POSITIVE FOR SQUAMOUS CARCINOMA  THE SPECIMEN IS BEING SENT FOR ADVANCED STUDIES      Microscopic Exam 07/01/2022    Final                    Value:The specimens contain non-small cell carcinoma with a distinct squamous aura.   There are suggestions of keratinization in specimen 2. The squamous nature  is supported by distinct p40 positivity, while TTF is negative.  The positive  and negative controls stained appropriately.      Gross 07/01/2022    Final                    Value:1:  Surgery ID:  [id number];   Pathology ID:  [id number]  1. Received in formalin labeled transbronchial biopsy RLL, are multiple  fragments, ranging in measurements of 0.5-3 mm in greatest dimension.  Submitted entirely.  DRN--1-A  Grossed by: Michelle Mancini   2: Surgery ID:  [id number];  Pathology ID:  [id number]  2. Received in formalin labeled bronchial biopsy RLL, are multiple fragments,  range in measurements of 0.5-3 mm in greatest dimension. Submitted entirely.  QXW--2-A  Grossed by: Michelle Mancini      Disclaimer 07/01/2022    Final                    Value:Unless the case is a 'gross only' or additional testing only, the final  diagnosis for each specimen is based on a microscopic examination of  appropriate tissue sections.      Final Pathologic Diagnosis 07/01/2022  (A)  Final                    Value:1. Lung, right lower lobe, bronchial brushing (6 smears and 1 thin prep):  -  Positive  for non-small cell carcinoma, favor squamous cell carcinoma  - Please correlate with concurrent surgical pathology biopsy (BRS-)  for further classification and molecular studies  2. Lung, right lower lobe, bronchial washing for cytology (thin prep and cell  block):  - Very scantly cellular specimen with single focus of atypical degenerating  cells, suspicious for malignancy  - Completely acellular cellblock, not amenable for further  immunohistochemical workup      Disclaimer 07/01/2022  (A)  Final                    Value:Screening was performed at [facility], [address] [place] [address].      Respiratory Culture 07/01/2022 No growth   Final    Gram Stain (Respiratory) 07/01/2022 <10 epithelial cells per low power field.   Final    Gram Stain (Respiratory) 07/01/2022 No WBC's   Final    Gram Stain (Respiratory) 07/01/2022 No organisms seen   Final    AFB Culture & Smear 07/01/2022 Culture in progress   Preliminary    AFB CULTURE STAIN 07/01/2022 No acid fast bacilli  seen.   Final    Fungus (Mycology) Culture 07/01/2022 Culture in progress   Preliminary    KOH Prep 07/01/2022 No yeast or fungal elements seen   Final       Imaging:    No results found for this or any previous visit (from the past 2160 hour(s)).  No results found for this or any previous visit (from the past 2160 hour(s)).  Results for orders placed or performed during the hospital encounter of 05/24/22 (from the past 2160 hour(s))   NM PET CT Routine FDG    Impression    Large cavitary FDG avid right lung mass.  Malignancy versus infectious etiology..  Mediastinal lymph nodes as above.  Other findings as above.      Electronically signed by: Brian Rivera MD  Date:    05/24/2022  Time:    12:58     PET CT 5/25/22:  Head/neck: Normal physiologic activity present.    Chest: 6.2 x 5.2 cm cavitary mass lesion within the right lung which extends to the right hilum and abuts the posteromedial pleura.  Lesion is FDG avid within SUV max of 35.  Adjacent atelectasis/airspace opacity present.  No other FDG avid pulmonary nodule appreciated.  Correlate with diagnostic CT chest imaging.     No hilar FDG avid lymph nodes.  No axillary FDG avid lymph nodes.  Mildly enlarged right paratracheal lymph no demonstrating low level activity maintaining fatty hilum. Subcentimeter short axis more cranial right paratracheal lymph node present demonstrating a SUV max 2.7.  Subcentimeter subcarinal lymph node with low level activity.     Bilateral gynecomastia noted.     Abdomen/pelvis: Physiologic activity without concerning FDG avid focus.     Renal cysts present.  Prostate enlargement.  Left inguinal hernia containing a portion of the sigmoid colon.  No evidence of obstruction or strangulation.     Skeletal/subcutaneous structures: No FDG avid osseous lesion.     Impression:     Large cavitary FDG avid right lung mass.  Malignancy versus infectious etiology..  Mediastinal lymph nodes as above.  Other findings as  above.    Pathology:    1. AND 2. RIGHT LOWER LOBE TRANSBRONCHIAL AND BRONCHIAL BIOPSIES:   POSITIVE FOR SQUAMOUS CARCINOMA    ?   Assessment/Plan:   Bronchogenic cancer of right lung  -     Ambulatory referral/consult to Hematology / Oncology       1. Bronchogenic cancer of right lung          Plan:     Problem List Items Addressed This Visit        Oncology    Bronchogenic cancer of right lung        # Squamous Cell Carcinoma of Right Lung, Stage IIIB; [id number]   biopsy-proven squamous cell carcinoma transbronchial biopsy; PET with mild avidity of right paratracheal node cannot exclude disease involvement.  We did discuss consideration of bronchoscopy/EBUS for pathologic evaluation of lymph node involvement however given high risk lesion central, larger size and avidity, in discussion with Radiation Oncology colleague Dr. Vanegas preference to avoid additional procedure and delay in initiation of therapy in favor of proceeding with definitive chemoradiation including within radiation field with attempt to limit toxicity.  Tenuous functional status and discussed with patient and family present with assistance of  diagnosis, treatment plan and risks and benefits of therapy with chemoradiation plan and close monitoring for toxicity however concurrent chemotherapy with carboplatin paclitaxel is preferred given locally advanced malignancy.  We will arrange for chemotherapy teaching and consent.    Iron deficiency anemia:  Has been on oral iron with persistent iron deficiency low iron saturation low normal ferritin.  Recommend optimization prior to initiation of chemoradiation    Anorexia:  Recommend nutrition consultation, order placed.  Recommend against Megace due to some association with thrombotic risk which is additive in setting of malignancy. Can trial dronabinol.  Recommend palliative care and nutrition consultation.    Follow-Up:   IV iron therapy, orders placed  Chemotherapy teaching/consent,  treatment plan placed  Follow-up with us for cycle 1 day 1 in coordination with radiation

## 2022-07-18 NOTE — PROGRESS NOTES
Subjective:      DATE OF VISIT: 7/18/22     ?  Patient ID:?Real Pagan is a 87 y.o. male.?? MR#: [id number]   ?   REFERRING PROVIDER: Jey Scott MD  [address] [place] [address]     ? Primary Care Providers:  Shruthi Burr MD, MD (General)     CHIEF COMPLAINT: ????   ?   ONCOLOGIC DIAGNOSIS: ***  ?   CURRENT TREATMENT: ***    PAST TREATMENT:  ***  ?   ONCOLOGIC HISTORY:   ?   Oncology History    No history exists.     Cancer Staging  No matching staging information was found for the patient.    ***   HPI    ***    Review of Systems    ?   A comprehensive 14-point review of systems was reviewed with patient and was negative other than as specified above.   ?   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     PAST SURGICAL HISTORY:   Past Surgical History:   Procedure Laterality Date    BRONCHOSCOPY N/A 7/1/2022    Procedure: Bronchoscopy;  Surgeon: Jey Scott MD;  [facility];  Service: Endoscopy;  Laterality: N/A;    CATARACT EXTRACTION, BILATERAL        ?   ALLERGIES:   Allergies as of 07/18/2022    (No Known Allergies)      ?   MEDICATIONS:?   Outpatient Medications Marked as Taking for the 7/18/22 encounter (Office Visit) with Lindsay Paiz MD   Medication Sig Dispense Refill    albuterol (PROVENTIL) 2.5 mg /3 mL (0.083 %) nebulizer solution Take 3 mLs (2.5 mg total) by nebulization every 4 to 6 hours as needed for Wheezing or Shortness of Breath. 360 mL 11    albuterol (PROVENTIL/VENTOLIN HFA) 90 mcg/actuation inhaler Inhale 2 puffs into the lungs every 6 (six) hours as needed. 18 g 0    ferrous sulfate (FEOSOL) 325 mg (65 mg iron) Tab tablet Take 325 mg by mouth daily with breakfast.      predniSONE (DELTASONE) 20 MG tablet Prednisone 60 mg/ day for 3 days, 40 mg/day for 3 days,20 mg/ day for 3 days, (1/2 tablet  )10 mg a day for 3 days. 20 tablet 0    promethazine-codeine 6.25-10 mg/5 ml (PHENERGAN WITH CODEINE) 6.25-10 mg/5 mL syrup Take 5 mLs by mouth every 6 (six) hours as needed for Cough. 240 mL 0    rosuvastatin (CRESTOR) 10 MG tablet TAKE 1 TABLET(10 MG) BY MOUTH EVERY DAY 90 tablet 0      ?   SOCIAL HISTORY:?   Social History     Tobacco Use    Smoking status: Current Every Day Smoker     Packs/day: 0.25     Years: 69.00     Pack years: 17.25     Types: Cigarettes     Start date: 1/1/1953    Smokeless tobacco: Never Used   Substance Use Topics    Alcohol use: Yes      ?   ***   ?   FAMILY HISTORY:   family history includes Cancer in his brother; Diabetes in his daughter.   ?   ***     Objective:      Physical Exam      ?   Vitals:    07/18/22 0755   BP: 113/66   Pulse: 83   Temp: 97.5 °F (36.4 °C)      ?   ECOG:?***   General appearance: Generally well appearing, in no acute distress.   Head, eyes, ears, nose, and throat: Pupils round and equally reactive to light. Oropharynx clear with moist mucous membranes.   Cardiovascular: Regular rate and rhythm, S1, S2, no audible murmurs.   Respiratory: Lungs clear to auscultation bilaterally.   Abdomen: Bowel sounds present, soft, nontender, nondistended.   Extremities: Warm, without edema.   Neurologic: Alert and oriented. Grossly normal strength, coordination, and gait.   Skin: No rashes, ecchymoses or petechial lesion.   ?   ***   ?   Laboratory:  ?   No visits with results within 1 Day(s) from this visit.   Latest known visit with results is:   Admission on 07/01/2022, Discharged on 07/01/2022   Component Date Value Ref Range Status    Final Pathologic Diagnosis 07/01/2022    Final                    Value:1. AND 2. RIGHT LOWER LOBE TRANSBRONCHIAL AND BRONCHIAL BIOPSIES:  POSITIVE FOR SQUAMOUS CARCINOMA  THE SPECIMEN IS BEING SENT FOR ADVANCED STUDIES      Microscopic Exam 07/01/2022    Final                    Value:The specimens contain non-small cell carcinoma  with a distinct squamous aura.   There are suggestions of keratinization in specimen 2. The squamous nature  is supported by distinct p40 positivity, while TTF is negative.  The positive  and negative controls stained appropriately.      Gross 07/01/2022    Final                    Value:1:  Surgery ID:  [id number];  Pathology ID:  [id number]  1. Received in formalin labeled transbronchial biopsy RLL, are multiple  fragments, ranging in measurements of 0.5-3 mm in greatest dimension.  Submitted entirely.  CQY--1-A  Grossed by: Michelle Mancini   2: Surgery ID:  [id number];  Pathology ID:  [id number]  2. Received in formalin labeled bronchial biopsy RLL, are multiple fragments,  range in measurements of 0.5-3 mm in greatest dimension. Submitted entirely.  AUP--2-A  Grossed by: Michelle Mancini      Disclaimer 07/01/2022    Final                    Value:Unless the case is a 'gross only' or additional testing only, the final  diagnosis for each specimen is based on a microscopic examination of  appropriate tissue sections.      Final Pathologic Diagnosis 07/01/2022  (A)  Final                    Value:1. Lung, right lower lobe, bronchial brushing (6 smears and 1 thin prep):  -  Positive  for non-small cell carcinoma, favor squamous cell carcinoma  - Please correlate with concurrent surgical pathology biopsy (BRS-)  for further classification and molecular studies  2. Lung, right lower lobe, bronchial washing for cytology (thin prep and cell  block):  - Very scantly cellular specimen with single focus of atypical degenerating  cells, suspicious for malignancy  - Completely acellular cellblock, not amenable for further  immunohistochemical workup      Disclaimer 07/01/2022  (A)  Final                    Value:Screening was performed at [facility], [address], [place] [address].      Respiratory Culture 07/01/2022 No growth   Final    Gram Stain  (Respiratory) 07/01/2022 <10 epithelial cells per low power field.   Final    Gram Stain (Respiratory) 07/01/2022 No WBC's   Final    Gram Stain (Respiratory) 07/01/2022 No organisms seen   Final    AFB Culture & Smear 07/01/2022 Culture in progress   Preliminary    AFB CULTURE STAIN 07/01/2022 No acid fast bacilli seen.   Final    Fungus (Mycology) Culture 07/01/2022 Culture in progress   Preliminary    KOH Prep 07/01/2022 No yeast or fungal elements seen   Final      ?   Tumor markers   ?   ?   Imaging:  ?  ***  No results found for this or any previous visit (from the past 2160 hour(s)).  No results found for this or any previous visit (from the past 2160 hour(s)).  Results for orders placed or performed during the hospital encounter of 05/24/22 (from the past 2160 hour(s))   NM PET CT Routine FDG    Impression    Large cavitary FDG avid right lung mass.  Malignancy versus infectious etiology..  Mediastinal lymph nodes as above.  Other findings as above.      Electronically signed by: Brian Rivera MD  Date:    05/24/2022  Time:    12:58         Pathology:    ***     ?   Assessment/Plan:   Bronchogenic cancer of right lung  -     Ambulatory referral/consult to Hematology / Oncology       1. Bronchogenic cancer of right lung          Plan:     Problem List Items Addressed This Visit        Oncology    Bronchogenic cancer of right lung            Advance Care Planning   {ACP:92875}       Follow-Up:   There are no Patient Instructions on file for this visit.

## 2022-07-18 NOTE — PROGRESS NOTES
[facility] [place]  RADIATION ONCOLOGY CONSULTATION    Name: Real Pagan  : 1935      Patient Referred To Radiation Oncology By:  Dr. Jey Scott MD  [address] [place] [address]    DIAGNOSIS: R lung squamous cell carcinoma [id number], stage IIIB    HISTORY OF PRESENT ILLNESS:  Real Pagan is a 87 y.o. male who presents for consultation for the above diagnosis.    present    Presented with weight loss and cough.  CXR 22 showed cavitary density in R lung.  PET 22 showed 6.2cm cavity mass in R lung extending to R hilum and abutting pleura, avid Max SUV 35, mildly enlarged R paratracheal node low level uptake max suv 2.7, no distant metastases noted.    Biopsy from R lower lung showed squamous cell carcinoma.     Today, he is fatigued and has poor appetite. Weight loss about 30lbs over past 3 months.   Denies hemoptysis, worsening shortness of breath, chest pain, dysphagia/odynophagia.    REVIEW OF SYSTEMS: (Positive findings bold, otherwise negative)   Constitutional: fever, fatigue, weight change  Eyes: blurred vision in the past 3 months, double vision   ENT: ear pain, new mouth lesions, jaw pain, difficulty swallowing, sore throat  Cardiovascular: chest pain on exertion, reflux, leg swelling  Respiratory: shortness of breath, dyspnea, cough, hemoptysis.   GI: abdominal pain, diarrhea, constipation, blood in stool, painful bowel movements  : painful or burning urination, blood in urine  Musculoskeletal: new bone or joint pains  Neurologic: headache, seizure, focal numbness or tingling, balance changes, speech changes  Lymph: new or enlarged lymph nodes  Psychiatric: depression, anxiety    PRIOR RADIATION HISTORY: none    PAST MEDICAL HISTORY:  Past Medical History:   Diagnosis Date    Dyslipidemia     Iron deficiency anemia secondary to inadequate dietary iron intake     Pre-diabetes        PAST SURGICAL HISTORY:  Past Surgical  History:   Procedure Laterality Date    BRONCHOSCOPY N/A 7/1/2022    Procedure: Bronchoscopy;  Surgeon: Jey Scott MD;  Location: [facility];  Service: Endoscopy;  Laterality: N/A;    CATARACT EXTRACTION, BILATERAL         ALLERGIES:   Review of patient's allergies indicates:  No Known Allergies    MEDICATIONS:    Current Outpatient Medications:     albuterol (PROVENTIL) 2.5 mg /3 mL (0.083 %) nebulizer solution, Take 3 mLs (2.5 mg total) by nebulization every 4 to 6 hours as needed for Wheezing or Shortness of Breath., Disp: 360 mL, Rfl: 11    albuterol (PROVENTIL/VENTOLIN HFA) 90 mcg/actuation inhaler, Inhale 2 puffs into the lungs every 6 (six) hours as needed., Disp: 18 g, Rfl: 0    ferrous sulfate (FEOSOL) 325 mg (65 mg iron) Tab tablet, Take 325 mg by mouth daily with breakfast., Disp: , Rfl:     promethazine-codeine 6.25-10 mg/5 ml (PHENERGAN WITH CODEINE) 6.25-10 mg/5 mL syrup, Take 5 mLs by mouth every 6 (six) hours as needed for Cough., Disp: 240 mL, Rfl: 0    rosuvastatin (CRESTOR) 10 MG tablet, TAKE 1 TABLET(10 MG) BY MOUTH EVERY DAY, Disp: 90 tablet, Rfl: 0    predniSONE (DELTASONE) 20 MG tablet, Prednisone 60 mg/ day for 3 days, 40 mg/day for 3 days,20 mg/ day for 3 days, (1/2 tablet )10 mg a day for 3 days. (Patient not taking: Reported on 7/18/2022), Disp: 20 tablet, Rfl: 0    SOCIAL HISTORY:  Social History     Socioeconomic History    Marital status:    Tobacco Use    Smoking status: Current Every Day Smoker     Packs/day: 0.25     Years: 69.00     Pack years: 17.25     Types: Cigarettes     Start date: 1/1/1953    Smokeless tobacco: Never Used   Substance and Sexual Activity    Alcohol use: Yes    Drug use: Never    Sexual activity: Never     Lives in     FAMILY HISTORY:  Family History   Problem Relation Age of Onset    Cancer Brother     Diabetes Daughter        PHYSICAL EXAMINATION:  Constitutional: well appearing, no acute distress, ECOG 1 - Ambulates, capable of  "light work  Vitals:    /70   Pulse 76   Temp 97.7 °F (36.5 °C)   Resp 18   Ht 5' 5" (1.651 m)   Wt 50.2 kg (110 lb 11.2 oz)   SpO2 98%   BMI 18.42 kg/m²   Eyes: sclera anicteric, EOMI, pupils equal, round and reactive to light  ENT: oral cavity without lesions, moist mucous membranes  Neck: trachea midline, neck supple  Lymphatic: no cervical, supraclavicular or axillary adenopathy  Cardiovascular: regular rate, no edema of the upper or lower extremities, radial pulse 2+  Respiratory: unlabored effort, clear to auscultation, no wheezes   Abdomen: soft, non-tender, no rigidity, no masses, no hepatomegaly  Neuro: Cranial nerves III-XII intact, speech not slurred, gait non-ataxic, no dysdiadochokinesia, strength 5/5 upper and lower extremities, reflexes patellar  Spine: non-tender to percussion cervical, thoracic and lumbosacral spine    IMAGING AND LABORATORY FINDINGS: As per HPI; images reviewed personally.    ASSESSMENT: 87 y.o. male with R lung SCC, T3N0 v T3N2    PLAN: Mr. Pagan has a locally advanced lung cancer. Large cavitary mass and questionable R paratracheal node.  The R paratracheal node is mildly avid but enlarged, I would favor considering it involved as otherwise he would need bronchoscopy/EBUS pathological evaluation which would delay his treatment further by 2+ weeks - will discuss with [facility].  Given large size and location of R lung mass, he is not candidate for SBRT anyway.  I prefer chemoradiation given size of mass and to avoid further delay of treatment for claritza evaluation.  If he is not chemotherapy candidate, would treat definitively with radiation alone to 60Gy/15fx or similar, noting higher risk for distant spread and decreased chance of cure vs chemoradiation.    I recommend radiation to 60Gy/30fx with concurrent chemotherapy per medical oncology. Some potential toxicities include fatigue, esophagitis, and risk of pneumonitis.  MRI brain will be ordered to complete " staging    We discussed the techniques, toxicities and indications of radiation and I answered the patient's questions to their apparent satisfaction. Informed consent was obtained and CT simulation will be scheduled shortly to expedite treatment.    I spent approximately 60 minutes reviewing the available records and evaluating the patient, out of which over 50% of the time was spent face to face with the patient in counseling and coordinating this patient's care.    Alvarado Vanegas III, M.D.  Radiation Oncology  [facility] [address] [place] [address]  Ph: [phone]  [email or web address]

## 2022-07-19 ENCOUNTER — DOCUMENTATION ONLY (OUTPATIENT)
Dept: HEMATOLOGY/ONCOLOGY | Facility: CLINIC | Age: 87
End: 2022-07-19
Payer: MEDICAID

## 2022-07-19 ENCOUNTER — TELEPHONE (OUTPATIENT)
Dept: HEMATOLOGY/ONCOLOGY | Facility: CLINIC | Age: 87
End: 2022-07-19
Payer: MEDICAID

## 2022-07-19 NOTE — TELEPHONE ENCOUNTER
Attempt made to contact pt at all numbers in chart. No answer . I did speak with Bhavya who is a family member and she states she can take care of pt appt needs. She accompanies him to appts as well as the pt daughter whom she will convey appt information to.  We reviewed my role as nurse navigator and she has my direct contact number  We set up chemotherapy education appt for 7/21/22 at the [place] location per Bhavya's request.    Treatment plan entered 7/18/22 for taxol carboplatin with concurrent radiation. Insurance auth remains pending, however will follow per process to check for  approval  Pt has XRT SIM set for 7/28/22 along with MRI Brain.  I explained the process for scheduling chemotherapy infusion appointment after we find out when radiation will begin.  I will follow xrt start date to set up first chemo infusion, lab and Md appts. Pt family member states understanding  Pt speaks Thai , however has refused an  for visits. Family members will accompany pt. Or use language line for office visits.   I have sent a referral message to [facility] for nutrition referral as per dr tejeda's request  I will also notify social work team and  of new pt start.  Bhavya aware and in agreement with above plans.   Dr. Tejeda's nurse to set up IV iron infusions prior to chemo start  They know to contact us with any needs meanwhile and I will follow pt

## 2022-07-19 NOTE — PROGRESS NOTES
Oncology Navigation   Intake  Cancer Type: Thoracic  Initial Nurse Navigator Contact: 2022  Date Worked: 2022  Multiple appointments: Yes  Reason for Treatment Delay: Other (awaiting xrt SIM and start date)     Treatment  Current Status: Active       Medical Oncologist: ileana  Chemotherapy: Planned  Chemotherapy Regimen: Taxol Carboplatin weekly with concurrent xrt    Radiation Oncologist: edward DEE 22       General Referrals: Social work  Social Work Referral Date: 2022       Radiation Oncologist: edward DEE 22    Support Systems: Family members  Concerns: Burkinan speaking pt refuses / family members accompany pt     Acuity  Systemic Treatment - predicted or initiated: More than one treatment modality concurrently (chemotherapy, radiation, etc.) (+2)  Treatment Tolerability: Has not started treatment yet/treatment fully completed and side effects resolved  ECO  Comorbidities in Medical History: 1   Needed: 1  Support: 0  Transportation: 0  Verbalizes the need for more education: 1  Navigation Acuity: 10     Follow Up  No follow-ups on file.

## 2022-07-20 ENCOUNTER — TELEPHONE (OUTPATIENT)
Dept: HEMATOLOGY/ONCOLOGY | Facility: CLINIC | Age: 87
End: 2022-07-20
Payer: MEDICAID

## 2022-07-20 ENCOUNTER — CLINICAL SUPPORT (OUTPATIENT)
Dept: NEUROLOGY | Facility: HOSPITAL | Age: 87
End: 2022-07-20
Payer: MEDICAID

## 2022-07-20 VITALS — BODY MASS INDEX: 18.44 KG/M2 | HEIGHT: 65 IN | WEIGHT: 110.69 LBS

## 2022-07-20 DIAGNOSIS — C34.91 BRONCHOGENIC CANCER OF RIGHT LUNG: Primary | ICD-10-CM

## 2022-07-20 DIAGNOSIS — C34.91 SQUAMOUS CELL CARCINOMA OF RIGHT LUNG: ICD-10-CM

## 2022-07-20 DIAGNOSIS — R64 CANCER CACHEXIA: ICD-10-CM

## 2022-07-20 RX ORDER — PROCHLORPERAZINE MALEATE 5 MG
5 TABLET ORAL EVERY 6 HOURS PRN
Qty: 30 TABLET | Refills: 1 | Status: SHIPPED | OUTPATIENT
Start: 2022-07-20 | End: 2022-10-13

## 2022-07-20 RX ORDER — ONDANSETRON 8 MG/1
8 TABLET, ORALLY DISINTEGRATING ORAL EVERY 12 HOURS PRN
Qty: 30 TABLET | Refills: 1 | Status: SHIPPED | OUTPATIENT
Start: 2022-07-20 | End: 2022-10-13

## 2022-07-20 NOTE — PROGRESS NOTES
RE: Nutrition Call     Attempted to call patient regarding a referral to Hematology/Oncology Nutrition Services however, no answer or call back. Referral transitioned to RD in Oncology Services at [facility].     Future Appointments   Date Time Provider Department Center   8/23/2022 10:20 AM CLARA TONEY CC LAB [facility] LAB DS [facility]   8/24/2022 11:20 AM Lindsay Paiz MD [facility] HEM ONC [facility]   8/24/2022  1:00 PM CHAIR 08 [facility] INFSN [facility]   9/20/2022  2:00 PM Brook Horton PA-C [facility] PALLCAR [facility]   12/9/2022  2:30 PM Abhilash Saavedra MD [facility] UROLOGY [place]   1/11/2023 11:15 AM MAHOGANY RAYMUNDO-DR [facility] [place]   1/11/2023 11:40 AM [facility]

## 2022-07-20 NOTE — TELEPHONE ENCOUNTER
RE: Nutrition Call     Attempted to call patient regarding referral to Hematology/Oncology Nutrition Services however, no answer. Patient scheduled for audio consult today. Will cont to make attempts to reach patient.     Call time: 1 minutes

## 2022-07-21 ENCOUNTER — CLINICAL SUPPORT (OUTPATIENT)
Dept: HEMATOLOGY/ONCOLOGY | Facility: CLINIC | Age: 87
End: 2022-07-21
Payer: MEDICAID

## 2022-07-21 ENCOUNTER — DOCUMENTATION ONLY (OUTPATIENT)
Dept: INFUSION THERAPY | Facility: HOSPITAL | Age: 87
End: 2022-07-21
Payer: MEDICAID

## 2022-07-21 NOTE — PROGRESS NOTES
Met with patient daughter, grandaughter, and close family friend who acted as  (per family request and noted in chart), in person____) to discuss upcoming systemic therapy initiation. Discussed patient diagnosis including staging information. Also discussed that therapy regimen prescribed involves the following drugs: _Carboplatin and Taxol__, and timeline of therapy administration. Went over what to expect on first day of treatment, including what to bring with you, visitor policy, and infusion suite guidelines. Also discussed supportive and shared services available to patient, including social work, financial counseling, oncology nutrition, and oncology psychology.      Covered with patient potential side effects and symptom management. Reviewed supportive medications that will be given before, during, and after treatment and provided written information for all.  Also stressed that other side effects are possible outside of those listed. Spent additional time on signs of infection, infection prevention, and proper nutrition/hydration.    Education provided to patient via [facility] specific drug information (printed in Divehi) and chemotherapy education binder, which is in English, but pt lives with family including son-in-law who reads and speaks English__. Also provided contact information for clinic and information related to [facility] communication. Discussed communication process for after-hours needs and some common scenarios in which patient should call provider for guidance vs. immediately report to the emergency room.     Finally, patient was given my contact information. Encouraged patient to call with any questions, concerns, or needs. Patient verbalized understanding of all above information.

## 2022-07-22 LAB
FINAL PATHOLOGIC DIAGNOSIS: NORMAL
GROSS: NORMAL
Lab: NORMAL
MICROSCOPIC EXAM: NORMAL
SUPPLEMENTAL DIAGNOSIS: NORMAL

## 2022-07-22 RX ORDER — DRONABINOL 2.5 MG/1
2.5 CAPSULE ORAL
Qty: 60 CAPSULE | Refills: 2 | Status: SHIPPED | OUTPATIENT
Start: 2022-07-22 | End: 2022-08-24

## 2022-07-26 ENCOUNTER — TELEPHONE (OUTPATIENT)
Dept: NEUROLOGY | Facility: HOSPITAL | Age: 87
End: 2022-07-26
Payer: MEDICAID

## 2022-07-26 NOTE — TELEPHONE ENCOUNTER
RE: Nutrition Call     2nd attempt to call patient regarding referral to Hematology/Oncology Nutrition Services however, no answer. Left voice message with RD number for return call.     Call time: 1 minute

## 2022-07-28 ENCOUNTER — HOSPITAL ENCOUNTER (OUTPATIENT)
Dept: RADIOLOGY | Facility: HOSPITAL | Age: 87
Discharge: HOME | End: 2022-07-28
Attending: RADIOLOGY
Payer: MEDICAID

## 2022-07-28 ENCOUNTER — HOSPITAL ENCOUNTER (OUTPATIENT)
Dept: RADIATION THERAPY | Facility: HOSPITAL | Age: 87
Discharge: HOME | End: 2022-07-28
Attending: RADIOLOGY
Payer: MEDICAID

## 2022-07-28 DIAGNOSIS — C34.91 BRONCHOGENIC CANCER OF RIGHT LUNG: Primary | ICD-10-CM

## 2022-07-28 DIAGNOSIS — C34.91 BRONCHOGENIC CANCER OF RIGHT LUNG: ICD-10-CM

## 2022-07-28 PROCEDURE — 77014 PR  CT GUIDANCE PLACEMENT RAD THERAPY FIELDS: CPT | Mod: 26,,, | Performed by: RADIOLOGY

## 2022-07-28 PROCEDURE — 70553 MRI BRAIN STEM W/O & W/DYE: CPT | Mod: TC,PN

## 2022-07-28 PROCEDURE — A9585 GADOBUTROL INJECTION: HCPCS | Mod: PN | Performed by: RADIOLOGY

## 2022-07-28 PROCEDURE — 77014 PR  CT GUIDANCE PLACEMENT RAD THERAPY FIELDS: ICD-10-PCS | Mod: 26,,, | Performed by: RADIOLOGY

## 2022-07-28 PROCEDURE — 77014 HC CT GUIDANCE RADIATION THERAPY FLDS PLACEMENT: CPT | Mod: TC | Performed by: RADIOLOGY

## 2022-07-28 PROCEDURE — 77334 PR  RADN TREATMENT AID(S) COMPLX: ICD-10-PCS | Mod: 26,,, | Performed by: RADIOLOGY

## 2022-07-28 PROCEDURE — 77263 THER RADIOLOGY TX PLNG CPLX: CPT | Mod: ,,, | Performed by: RADIOLOGY

## 2022-07-28 PROCEDURE — 77263 PR  RADIATION THERAPY PLAN COMPLEX: ICD-10-PCS | Mod: ,,, | Performed by: RADIOLOGY

## 2022-07-28 PROCEDURE — 77334 RADIATION TREATMENT AID(S): CPT | Mod: 26,,, | Performed by: RADIOLOGY

## 2022-07-28 PROCEDURE — 25500020 PHARM REV CODE 255: Mod: PN | Performed by: RADIOLOGY

## 2022-07-28 PROCEDURE — 77334 RADIATION TREATMENT AID(S): CPT | Mod: TC | Performed by: RADIOLOGY

## 2022-07-28 RX ORDER — GADOBUTROL 604.72 MG/ML
5 INJECTION INTRAVENOUS
Status: COMPLETED | OUTPATIENT
Start: 2022-07-28 | End: 2022-07-28

## 2022-07-28 RX ADMIN — GADOBUTROL 5 ML: 604.72 INJECTION INTRAVENOUS at 03:07

## 2022-07-29 ENCOUNTER — TELEPHONE (OUTPATIENT)
Dept: HEMATOLOGY/ONCOLOGY | Facility: CLINIC | Age: 87
End: 2022-07-29
Payer: MEDICAID

## 2022-07-29 DIAGNOSIS — C34.91 BRONCHOGENIC CANCER OF RIGHT LUNG: Primary | ICD-10-CM

## 2022-07-29 NOTE — TELEPHONE ENCOUNTER
Received Radiation tentative start date as 8/9/22  I have spoken to pt family member Bhavya today over the phone and reviewed upcoming lab, provider and infusion appts.   Pt is coming for second dose of IV iron 8/8/22 and will have pre chemo labs afterwards at the [place] location  He will then begin radiation on 8/9/22 / time has not been set yet/ rad/onc is to reach out to pt with this information  Pt will see NP and then have first infusion 8/10/22 at the cancer center  Bhavya is good with appts and states someone will be with pt . He declines  , however did have  for initial visit.   Bhavya is aware that I will not be available at [facility] after today and knows to call the navigation phone # to speak with someone in the navigation dept.   Report given to navigation dept. On this pt.  Pt will need consent for chemo signed at provider visit.

## 2022-08-01 ENCOUNTER — HOSPITAL ENCOUNTER (OUTPATIENT)
Dept: RADIATION THERAPY | Facility: HOSPITAL | Age: 87
Discharge: HOME | End: 2022-08-01
Attending: RADIOLOGY
Payer: MEDICAID

## 2022-08-02 ENCOUNTER — TELEPHONE (OUTPATIENT)
Dept: HEMATOLOGY/ONCOLOGY | Facility: CLINIC | Age: 87
End: 2022-08-02
Payer: MEDICAID

## 2022-08-02 LAB — FUNGUS SPEC CULT: NORMAL

## 2022-08-02 NOTE — TELEPHONE ENCOUNTER
Pt cousin () called   [phone]  Bhavya    Stating that in Dr. Paiz office visit MD mentioned getting a port placed for pt. There are no instructions for this for nurse or navigator. Nurse will route to Md for advice

## 2022-08-03 PROCEDURE — 77301 RADIOTHERAPY DOSE PLAN IMRT: CPT | Mod: 26,,, | Performed by: RADIOLOGY

## 2022-08-03 PROCEDURE — 77293 RESPIRATOR MOTION MGMT SIMUL: CPT | Mod: 26,,, | Performed by: RADIOLOGY

## 2022-08-03 PROCEDURE — 77293 PR RESPIRATORY MOTION MGMT SIMULATION: ICD-10-PCS | Mod: 26,,, | Performed by: RADIOLOGY

## 2022-08-03 PROCEDURE — 77293 RESPIRATOR MOTION MGMT SIMUL: CPT | Mod: TC | Performed by: RADIOLOGY

## 2022-08-03 PROCEDURE — 77301 RADIOTHERAPY DOSE PLAN IMRT: CPT | Mod: TC | Performed by: RADIOLOGY

## 2022-08-03 PROCEDURE — 77301 PR  INTEN MOD RADIOTHER PLAN W/DOSE VOL HIST: ICD-10-PCS | Mod: 26,,, | Performed by: RADIOLOGY

## 2022-08-04 PROCEDURE — 77300 PR RADIATION THERAPY,DOSIMETRY PLAN: ICD-10-PCS | Mod: 26,,, | Performed by: RADIOLOGY

## 2022-08-04 PROCEDURE — 77470 SPECIAL RADIATION TREATMENT: CPT | Mod: 26,59,, | Performed by: RADIOLOGY

## 2022-08-04 PROCEDURE — 77338 DESIGN MLC DEVICE FOR IMRT: CPT | Mod: 26,,, | Performed by: RADIOLOGY

## 2022-08-04 PROCEDURE — 77300 RADIATION THERAPY DOSE PLAN: CPT | Mod: 26,,, | Performed by: RADIOLOGY

## 2022-08-04 PROCEDURE — 77300 RADIATION THERAPY DOSE PLAN: CPT | Mod: TC | Performed by: RADIOLOGY

## 2022-08-04 PROCEDURE — 77338 PR  MLC IMRT DESIGN & CONSTRUCTION PER IMRT PLAN: ICD-10-PCS | Mod: 26,,, | Performed by: RADIOLOGY

## 2022-08-04 PROCEDURE — 77338 DESIGN MLC DEVICE FOR IMRT: CPT | Mod: TC | Performed by: RADIOLOGY

## 2022-08-04 PROCEDURE — 77470 SPECIAL RADIATION TREATMENT: CPT | Mod: 59,TC | Performed by: RADIOLOGY

## 2022-08-04 PROCEDURE — 77470 PR  SPECIAL RADIATION TREATMENT: ICD-10-PCS | Mod: 26,59,, | Performed by: RADIOLOGY

## 2022-08-08 ENCOUNTER — INFUSION (OUTPATIENT)
Dept: INFUSION THERAPY | Facility: HOSPITAL | Age: 87
End: 2022-08-08
Attending: INTERNAL MEDICINE
Payer: MEDICAID

## 2022-08-08 VITALS
HEART RATE: 69 BPM | SYSTOLIC BLOOD PRESSURE: 120 MMHG | DIASTOLIC BLOOD PRESSURE: 68 MMHG | WEIGHT: 106.94 LBS | RESPIRATION RATE: 16 BRPM | TEMPERATURE: 98 F | BODY MASS INDEX: 17.79 KG/M2 | OXYGEN SATURATION: 96 %

## 2022-08-08 DIAGNOSIS — D50.0 IRON DEFICIENCY ANEMIA DUE TO CHRONIC BLOOD LOSS: Primary | ICD-10-CM

## 2022-08-08 PROCEDURE — 63600175 PHARM REV CODE 636 W HCPCS: Mod: JG | Performed by: INTERNAL MEDICINE

## 2022-08-08 PROCEDURE — 96365 THER/PROPH/DIAG IV INF INIT: CPT

## 2022-08-08 PROCEDURE — 25000003 PHARM REV CODE 250: Performed by: INTERNAL MEDICINE

## 2022-08-08 RX ADMIN — FERUMOXYTOL 510 MG: 510 INJECTION INTRAVENOUS at 01:08

## 2022-08-08 NOTE — PLAN OF CARE
Pt is stable. Pt administered Feraheme today. Pt voiced he was weak and tired. Reviewed plan of care.   Problem: Fall Injury Risk  Goal: Absence of Fall and Fall-Related Injury  Outcome: Ongoing, Progressing

## 2022-08-08 NOTE — DISCHARGE INSTRUCTIONS
Thank you for letting me take care of you today!  -[facility]ak[facility] R[facility]  [address] [facility]  [id number] [place] [address] Drive  [phone] phone     [phone] fax  Hours of Operation: Monday- Friday 8:00am- 5:00pm  After hours phone  [phone]  Hematology / Oncology Physicians on call      Dr. Brian Downs, DIANNE Landry NP Cheree Boden, IVELISSE Mauricio        Please call with any concerns regarding your appointment today.

## 2022-08-09 ENCOUNTER — DOCUMENTATION ONLY (OUTPATIENT)
Dept: RADIATION ONCOLOGY | Facility: CLINIC | Age: 87
End: 2022-08-09
Payer: MEDICAID

## 2022-08-09 PROCEDURE — 77014 PR  CT GUIDANCE PLACEMENT RAD THERAPY FIELDS: CPT | Mod: 26,,, | Performed by: RADIOLOGY

## 2022-08-09 PROCEDURE — 77386 HC IMRT, COMPLEX: CPT | Performed by: RADIOLOGY

## 2022-08-09 PROCEDURE — 77014 HC CT GUIDANCE RADIATION THERAPY FLDS PLACEMENT: CPT | Mod: TC | Performed by: RADIOLOGY

## 2022-08-09 PROCEDURE — 77014 PR  CT GUIDANCE PLACEMENT RAD THERAPY FIELDS: ICD-10-PCS | Mod: 26,,, | Performed by: RADIOLOGY

## 2022-08-09 NOTE — PROGRESS NOTES
Subjective:       Patient ID: Real Pagan is a 87 y.o. male.    Chief Complaint:   1. Squamous cell carcinoma of right lung  Stage IIIB (cT3, cN2, cM0)     Current Treatment:   OP NSCLC PACLITAXEL + CARBOPLATIN (AUC) QW + RADIATION   OP FERUMOXYTOL    Treatment History:  OP FERUMOXYTOL on 8/8/2022    HPI: This is an 87 year old Portuguese-speaking man with medical history significant for dyslipidemia, iron deficiency anemia, and prediabetes who was initially seen in Hem/Onc in 12/2020 for iron deficiency. He was placed on oral iron. He returned to Hem/Onc in 7/2022 with newly diagnosed lung cancer. He presented to his PCP in 5/2022 with 20lb unintentional weight loss and fatigue over the previous 3 months; he also had nonproductive cough for 2 weeks. CXR revealed a cavitary masslike density seen projecting over the right hilar region. Follow up PET/CT demonstrated a large cavitary FDG avid right lung mass and mildly enlarged right paratracheal lymph no demonstrating low level activity maintaining fatty hilum; subcentimeter short axis more cranial right paratracheal lymph node present demonstrating a SUV max 2.7; subcentimeter subcarinal lymph node with low level activity.     Biopsy proved non small cell carcinoma, favoring squamous cell.     He is scheduled to start weekly Carbo/Taxol + XRT on 8/10/2022.    His primary Hematologist/Oncologist is Dr. Paiz.    Interval History: Patient presents for follow up on weekly Carbo/Taxol; He is scheduled to receive C1D1 today. He presents with his caregiver and a . He is quiet today; his  states he is tired. His only complaint is that his caregiver feeds him; he has no appetite.     Reviewed labs with patient:   CBC:   Recent Labs   Lab 08/08/22  1310   WBC 6.73   RBC 3.80 L   Hemoglobin 10.0 L   Hematocrit 32.7 L   Platelets 208   MCV 86   MCH 26.3 L   MCHC 30.6 L     CMP:  Recent Labs   Lab 08/08/22  1310   Glucose 107   Calcium 9.4   Albumin 2.4  L   Total Protein 7.1   Sodium 137   Potassium 4.0   CO2 27   Chloride 98   BUN 12   Creatinine 0.7   Alkaline Phosphatase 151 H   ALT 17   AST 20   Total Bilirubin 0.3     Reviewed medications with his caregiver and  to clarify how and when to take. Discussed port placement with patient, caregiver, and ; he declines.     Social History     Socioeconomic History    Marital status:    Tobacco Use    Smoking status: Current Every Day Smoker     Packs/day: 0.25     Years: 69.00     Pack years: 17.25     Types: Cigarettes     Start date: 1/1/1953    Smokeless tobacco: Never Used   Substance and Sexual Activity    Alcohol use: Yes    Drug use: Never    Sexual activity: Never     Past Medical History:   Diagnosis Date    Dyslipidemia     Iron deficiency anemia secondary to inadequate dietary iron intake     Pre-diabetes      Family History   Problem Relation Age of Onset    Cancer Brother     Diabetes Daughter      Past Surgical History:   Procedure Laterality Date    BRONCHOSCOPY N/A 7/1/2022    Procedure: Bronchoscopy;  Surgeon: Jey Scott MD;  Location: [facility];  Service: Endoscopy;  Laterality: N/A;    CATARACT EXTRACTION, BILATERAL       Review of Systems   Constitutional: Positive for appetite change (decreased) and unexpected weight change. Negative for fatigue.   HENT: Negative for mouth sores, rhinorrhea and sore throat.    Eyes: Negative.    Respiratory: Negative.    Cardiovascular: Negative.    Gastrointestinal: Negative for constipation, diarrhea, nausea and vomiting.   Endocrine: Negative.    Genitourinary: Negative.    Musculoskeletal: Negative.    Integumentary:  Negative.   Allergic/Immunologic: Negative.    Neurological: Negative for weakness and numbness.   Hematological: Negative.    Psychiatric/Behavioral: Negative.        Medication List with Changes/Refills   Current Medications    ALBUTEROL (PROVENTIL) 2.5 MG /3 ML (0.083 %) NEBULIZER SOLUTION    Take 3  mLs (2.5 mg total) by nebulization every 4 to 6 hours as needed for Wheezing or Shortness of Breath.    ALBUTEROL (PROVENTIL/VENTOLIN HFA) 90 MCG/ACTUATION INHALER    Inhale 2 puffs into the lungs every 6 (six) hours as needed.    DRONABINOL (MARINOL) 2.5 MG CAPSULE    Take 1 capsule (2.5 mg total) by mouth 2 (two) times daily before meals.    FERROUS SULFATE (FEOSOL) 325 MG (65 MG IRON) TAB TABLET    Take 325 mg by mouth daily with breakfast.    ONDANSETRON (ZOFRAN-ODT) 8 MG TBDL    Take 1 tablet (8 mg total) by mouth every 12 (twelve) hours as needed.    PREDNISONE (DELTASONE) 20 MG TABLET    Prednisone 60 mg/ day for 3 days, 40 mg/day for 3 days,20 mg/ day for 3 days, (1/2 tablet )10 mg a day for 3 days.    PROCHLORPERAZINE (COMPAZINE) 5 MG TABLET    Take 1 tablet (5 mg total) by mouth every 6 (six) hours as needed for Nausea.    PROMETHAZINE-CODEINE 6.25-10 MG/5 ML (PHENERGAN WITH CODEINE) 6.25-10 MG/5 ML SYRUP    Take 5 mLs by mouth every 6 (six) hours as needed for Cough.    ROSUVASTATIN (CRESTOR) 10 MG TABLET    TAKE 1 TABLET(10 MG) BY MOUTH EVERY DAY     Objective:     Vitals:    08/10/22 0853   BP: 117/65   Pulse: 79   Temp: 97.5 °F (36.4 °C)     Physical Exam  Vitals reviewed.   Constitutional:       Appearance: Normal appearance.      Comments: thin   HENT:      Head: Normocephalic.      Mouth/Throat:      Comments: Wearing mask    Eyes:      Extraocular Movements: Extraocular movements intact.      Pupils: Pupils are equal, round, and reactive to light.   Cardiovascular:      Rate and Rhythm: Normal rate and regular rhythm.      Heart sounds: Normal heart sounds.   Pulmonary:      Effort: Pulmonary effort is normal.      Breath sounds: Normal breath sounds.   Abdominal:      General: Bowel sounds are normal.      Palpations: Abdomen is soft.      Comments: rounded     Genitourinary:     Comments: deferred    Musculoskeletal:         General: Normal range of motion.      Cervical back: Normal range of  motion and neck supple.   Skin:     General: Skin is warm and dry.   Neurological:      Mental Status: He is alert and oriented to person, place, and time.   Psychiatric:         Behavior: Behavior normal.         Thought Content: Thought content normal.          (1) Restricted in physically strenuous activity, ambulatory and able to do work of light nature  Assessment:     Problem List Items Addressed This Visit        Oncology    Squamous cell carcinoma of right lung - Primary     Stage IIIB; [id number]   biopsy-proven squamous cell carcinoma transbronchial biopsy; PET with mild avidity of right paratracheal node cannot exclude disease involvement.  We did discuss consideration of bronchoscopy/EBUS for pathologic evaluation of lymph node involvement however given high risk lesion central, larger size and avidity, in discussion with Radiation Oncology colleague Dr. Vanegas preference to avoid additional procedure and delay in initiation of therapy in favor of proceeding with definitive chemoradiation including within radiation field with attempt to limit toxicity.  Tenuous functional status and discussed with patient and family present with assistance of  diagnosis, treatment plan and risks and benefits of therapy with chemoradiation plan and close monitoring for toxicity however concurrent chemotherapy with carboplatin paclitaxel is preferred given locally advanced malignancy.           Relevant Orders    CBC Oncology    Comprehensive Metabolic Panel    Magnesium    Iron deficiency anemia due to chronic blood loss     Has been on oral iron with persistent iron deficiency low iron saturation low normal ferritin. Received 1 dose of Feraheme; next dose scheduled for 8/15/2022.                Plan:     Squamous cell carcinoma of right lung  -     CBC Oncology; Future; Expected date: 08/16/2022  -     Comprehensive Metabolic Panel; Future; Expected date: 08/16/2022  -     Magnesium; Future; Expected date:  08/16/2022    Iron deficiency anemia due to chronic blood loss    Labs reviewed.   Ok to proceed with C1D1 of weekly Carbo/Taxol today.  Feraheme #2 scheduled for 8/15/2022.  Follow up in 1 week with Mg, CBC and Comprehensive Metabolic Panel for toxicity check prior to C2D1.    I will review assessment/plan with collaborating physician Dr. Jama.      VIC Weiner

## 2022-08-09 NOTE — ASSESSMENT & PLAN NOTE
Has been on oral iron with persistent iron deficiency low iron saturation low normal ferritin. Received 1 dose of Feraheme; next dose scheduled for 8/15/2022.

## 2022-08-09 NOTE — PLAN OF CARE
Day 1 of outpatient xrt to the lung. Lung handout & verbal instructions were given to the patient. Skin care & side effects were reviewed. Contact info was provided. Patient verbalized understanding.

## 2022-08-09 NOTE — ASSESSMENT & PLAN NOTE
Stage IIIB; [id number]   biopsy-proven squamous cell carcinoma transbronchial biopsy; PET with mild avidity of right paratracheal node cannot exclude disease involvement.  We did discuss consideration of bronchoscopy/EBUS for pathologic evaluation of lymph node involvement however given high risk lesion central, larger size and avidity, in discussion with Radiation Oncology colleague Dr. Vanegas preference to avoid additional procedure and delay in initiation of therapy in favor of proceeding with definitive chemoradiation including within radiation field with attempt to limit toxicity.  Tenuous functional status and discussed with patient and family present with assistance of  diagnosis, treatment plan and risks and benefits of therapy with chemoradiation plan and close monitoring for toxicity however concurrent chemotherapy with carboplatin paclitaxel is preferred given locally advanced malignancy.

## 2022-08-10 ENCOUNTER — SOCIAL WORK (OUTPATIENT)
Dept: HEMATOLOGY/ONCOLOGY | Facility: CLINIC | Age: 87
End: 2022-08-10

## 2022-08-10 ENCOUNTER — DOCUMENTATION ONLY (OUTPATIENT)
Dept: RADIATION ONCOLOGY | Facility: CLINIC | Age: 87
End: 2022-08-10
Payer: MEDICAID

## 2022-08-10 ENCOUNTER — DOCUMENTATION ONLY (OUTPATIENT)
Dept: HEMATOLOGY/ONCOLOGY | Facility: CLINIC | Age: 87
End: 2022-08-10

## 2022-08-10 ENCOUNTER — DOCUMENTATION ONLY (OUTPATIENT)
Dept: PALLIATIVE MEDICINE | Facility: CLINIC | Age: 87
End: 2022-08-10
Payer: MEDICAID

## 2022-08-10 ENCOUNTER — OFFICE VISIT (OUTPATIENT)
Dept: HEMATOLOGY/ONCOLOGY | Facility: CLINIC | Age: 87
End: 2022-08-10
Payer: MEDICAID

## 2022-08-10 ENCOUNTER — INFUSION (OUTPATIENT)
Dept: INFUSION THERAPY | Facility: HOSPITAL | Age: 87
End: 2022-08-10
Attending: INTERNAL MEDICINE
Payer: MEDICAID

## 2022-08-10 VITALS
TEMPERATURE: 98 F | HEART RATE: 79 BPM | OXYGEN SATURATION: 98 % | DIASTOLIC BLOOD PRESSURE: 65 MMHG | BODY MASS INDEX: 17.7 KG/M2 | SYSTOLIC BLOOD PRESSURE: 117 MMHG | WEIGHT: 106.25 LBS | HEIGHT: 65 IN

## 2022-08-10 VITALS
DIASTOLIC BLOOD PRESSURE: 57 MMHG | OXYGEN SATURATION: 98 % | RESPIRATION RATE: 18 BRPM | TEMPERATURE: 97 F | HEART RATE: 62 BPM | SYSTOLIC BLOOD PRESSURE: 133 MMHG

## 2022-08-10 DIAGNOSIS — C34.91 SQUAMOUS CELL CARCINOMA OF RIGHT LUNG: Primary | ICD-10-CM

## 2022-08-10 DIAGNOSIS — D50.0 IRON DEFICIENCY ANEMIA DUE TO CHRONIC BLOOD LOSS: ICD-10-CM

## 2022-08-10 PROCEDURE — 99214 PR OFFICE/OUTPT VISIT, EST, LEVL IV, 30-39 MIN: ICD-10-PCS | Mod: 25,S$PBB,, | Performed by: NURSE PRACTITIONER

## 2022-08-10 PROCEDURE — 96367 TX/PROPH/DG ADDL SEQ IV INF: CPT

## 2022-08-10 PROCEDURE — 77014 PR  CT GUIDANCE PLACEMENT RAD THERAPY FIELDS: CPT | Mod: 26,,, | Performed by: RADIOLOGY

## 2022-08-10 PROCEDURE — 1101F PT FALLS ASSESS-DOCD LE1/YR: CPT | Mod: CPTII,,, | Performed by: NURSE PRACTITIONER

## 2022-08-10 PROCEDURE — 3288F FALL RISK ASSESSMENT DOCD: CPT | Mod: CPTII,,, | Performed by: NURSE PRACTITIONER

## 2022-08-10 PROCEDURE — 77014 HC CT GUIDANCE RADIATION THERAPY FLDS PLACEMENT: CPT | Mod: TC | Performed by: RADIOLOGY

## 2022-08-10 PROCEDURE — 1126F PR PAIN SEVERITY QUANTIFIED, NO PAIN PRESENT: ICD-10-PCS | Mod: CPTII,,, | Performed by: NURSE PRACTITIONER

## 2022-08-10 PROCEDURE — 96415 CHEMO IV INFUSION ADDL HR: CPT

## 2022-08-10 PROCEDURE — 3288F PR FALLS RISK ASSESSMENT DOCUMENTED: ICD-10-PCS | Mod: CPTII,,, | Performed by: NURSE PRACTITIONER

## 2022-08-10 PROCEDURE — 99214 OFFICE O/P EST MOD 30 MIN: CPT | Mod: 25,S$PBB,, | Performed by: NURSE PRACTITIONER

## 2022-08-10 PROCEDURE — 77014 PR  CT GUIDANCE PLACEMENT RAD THERAPY FIELDS: ICD-10-PCS | Mod: 26,,, | Performed by: RADIOLOGY

## 2022-08-10 PROCEDURE — 1126F AMNT PAIN NOTED NONE PRSNT: CPT | Mod: CPTII,,, | Performed by: NURSE PRACTITIONER

## 2022-08-10 PROCEDURE — 1101F PR PT FALLS ASSESS DOC 0-1 FALLS W/OUT INJ PAST YR: ICD-10-PCS | Mod: CPTII,,, | Performed by: NURSE PRACTITIONER

## 2022-08-10 PROCEDURE — 25000003 PHARM REV CODE 250: Performed by: INTERNAL MEDICINE

## 2022-08-10 PROCEDURE — 63600175 PHARM REV CODE 636 W HCPCS: Performed by: INTERNAL MEDICINE

## 2022-08-10 PROCEDURE — 99213 OFFICE O/P EST LOW 20 MIN: CPT | Mod: PBBFAC,25 | Performed by: NURSE PRACTITIONER

## 2022-08-10 PROCEDURE — 96417 CHEMO IV INFUS EACH ADDL SEQ: CPT

## 2022-08-10 PROCEDURE — 99999 PR PBB SHADOW E&M-EST. PATIENT-LVL III: CPT | Mod: PBBFAC,,, | Performed by: NURSE PRACTITIONER

## 2022-08-10 PROCEDURE — 96413 CHEMO IV INFUSION 1 HR: CPT

## 2022-08-10 PROCEDURE — 99999 PR PBB SHADOW E&M-EST. PATIENT-LVL III: ICD-10-PCS | Mod: PBBFAC,,, | Performed by: NURSE PRACTITIONER

## 2022-08-10 PROCEDURE — 96375 TX/PRO/DX INJ NEW DRUG ADDON: CPT

## 2022-08-10 PROCEDURE — 77386 HC IMRT, COMPLEX: CPT | Performed by: RADIOLOGY

## 2022-08-10 RX ORDER — FAMOTIDINE 10 MG/ML
20 INJECTION INTRAVENOUS
Status: COMPLETED | OUTPATIENT
Start: 2022-08-10 | End: 2022-08-10

## 2022-08-10 RX ORDER — SODIUM CHLORIDE 0.9 % (FLUSH) 0.9 %
10 SYRINGE (ML) INJECTION
Status: CANCELLED | OUTPATIENT
Start: 2022-08-10

## 2022-08-10 RX ORDER — FAMOTIDINE 10 MG/ML
20 INJECTION INTRAVENOUS
Status: CANCELLED | OUTPATIENT
Start: 2022-08-10

## 2022-08-10 RX ORDER — EPINEPHRINE 0.3 MG/.3ML
0.3 INJECTION SUBCUTANEOUS ONCE AS NEEDED
Status: CANCELLED | OUTPATIENT
Start: 2022-08-10

## 2022-08-10 RX ORDER — HEPARIN 100 UNIT/ML
500 SYRINGE INTRAVENOUS
Status: CANCELLED | OUTPATIENT
Start: 2022-08-10

## 2022-08-10 RX ORDER — EPINEPHRINE 1 MG/ML
0.3 INJECTION, SOLUTION INTRACARDIAC; INTRAMUSCULAR; INTRAVENOUS; SUBCUTANEOUS ONCE AS NEEDED
Status: DISCONTINUED | OUTPATIENT
Start: 2022-08-10 | End: 2022-08-10 | Stop reason: HOSPADM

## 2022-08-10 RX ORDER — DIPHENHYDRAMINE HYDROCHLORIDE 50 MG/ML
50 INJECTION INTRAMUSCULAR; INTRAVENOUS ONCE AS NEEDED
Status: CANCELLED | OUTPATIENT
Start: 2022-08-10

## 2022-08-10 RX ORDER — SODIUM CHLORIDE 0.9 % (FLUSH) 0.9 %
10 SYRINGE (ML) INJECTION
Status: DISCONTINUED | OUTPATIENT
Start: 2022-08-10 | End: 2022-08-10 | Stop reason: HOSPADM

## 2022-08-10 RX ORDER — DIPHENHYDRAMINE HYDROCHLORIDE 50 MG/ML
50 INJECTION INTRAMUSCULAR; INTRAVENOUS ONCE AS NEEDED
Status: DISCONTINUED | OUTPATIENT
Start: 2022-08-10 | End: 2022-08-10 | Stop reason: HOSPADM

## 2022-08-10 RX ADMIN — PACLITAXEL 66 MG: 6 INJECTION, SOLUTION INTRAVENOUS at 11:08

## 2022-08-10 RX ADMIN — SODIUM CHLORIDE: 0.9 INJECTION, SOLUTION INTRAVENOUS at 10:08

## 2022-08-10 RX ADMIN — CARBOPLATIN 155 MG: 10 INJECTION, SOLUTION INTRAVENOUS at 01:08

## 2022-08-10 RX ADMIN — PALONOSETRON HYDROCHLORIDE 0.25 MG: 0.25 INJECTION, SOLUTION INTRAVENOUS at 10:08

## 2022-08-10 RX ADMIN — FAMOTIDINE 20 MG: 10 INJECTION INTRAVENOUS at 10:08

## 2022-08-10 RX ADMIN — DIPHENHYDRAMINE HYDROCHLORIDE 50 MG: 50 INJECTION, SOLUTION INTRAMUSCULAR; INTRAVENOUS at 11:08

## 2022-08-10 NOTE — PROGRESS NOTES
11:49 am Swer met with pt., pt.'s relative and pt.'s friend during today's infusion appointment time. Swer introduced self and reviewed sw services. Swer provided pt. with folder containing swer checklist, local organizations and business card. Pt. interested in referral to cancer services for boost assistance and financial assistance. Swer to fax referral pending provider signature. Pt. also interested in hospital bed for home use, shower chair with back and bedside commode. Swer to fax orders to [facility] pending provider signature. There were no other needs on today's date. Swer will remain available.       2:32pm: Swer faxed pt.'s hospital bed, shower chair and bedside commode orders to [facility]. Swer also faxed pt.'s referral to Cancer Services. Swer will remain available.

## 2022-08-10 NOTE — PLAN OF CARE
Problem: Adult Inpatient Plan of Care  Goal: Plan of Care Review  Outcome: Ongoing, Progressing  Flowsheets (Taken 8/10/2022 1311)  Plan of Care Reviewed With: (pt requests to use close friend as )   patient   daughter   family   other (see comments)     Problem: Adult Inpatient Plan of Care  Goal: Plan of Care Review  Outcome: Ongoing, Progressing  Flowsheets (Taken 8/10/2022 1311)  Plan of Care Reviewed With: (pt requests to use close friend as )   patient   daughter   family   other (see comments)  Goal: Patient-Specific Goal (Individualized)  Outcome: Ongoing, Progressing  Flowsheets (Taken 8/10/2022 1311)  Anxieties, Fears or Concerns: denies  Individualized Care Needs: feet elevated, warm blanket pillow and snack provided  Patient-Specific Goals (Include Timeframe): tolerate chemo today  Goal: Absence of Hospital-Acquired Illness or Injury  Outcome: Ongoing, Progressing  Goal: Optimal Comfort and Wellbeing  Outcome: Ongoing, Progressing  Intervention: Provide Person-Centered Care  Flowsheets (Taken 8/10/2022 1311)  Trust Relationship/Rapport:   care explained   questions answered   choices provided   questions encouraged   emotional support provided   reassurance provided   empathic listening provided   thoughts/feelings acknowledged  Goal: Readiness for Transition of Care  Outcome: Ongoing, Progressing     Problem: Anemia (Chemotherapy Effects)  Goal: Anemia Symptom Improvement  Outcome: Ongoing, Progressing  Intervention: Monitor and Manage Anemia  Flowsheets (Taken 8/10/2022 1311)  Safety Promotion/Fall Prevention:   assistive device/personal item within reach   Fall Risk reviewed with patient/family   medications reviewed   in recliner, wheels locked   instructed to call staff for mobility  Fatigue Management: frequent rest breaks encouraged     Problem: Urinary Bleeding Risk or Actual (Chemotherapy Effects)  Goal: Absence of Hematuria  Outcome: Ongoing, Progressing  Intervention:  Prevent or Manage Hemorrhagic Cystitis  Flowsheets (Taken 8/10/2022 1311)  Hyperhydration Management: (encouraged oral fluids) other (see comments)     Problem: Nausea and Vomiting (Chemotherapy Effects)  Goal: Fluid and Electrolyte Balance  Outcome: Ongoing, Progressing  Intervention: Prevent and Manage Nausea and Vomiting  Flowsheets (Taken 8/10/2022 1311)  Nausea/Vomiting Interventions: (aloxi given) other (see comments)  Environmental Support: calm environment promoted     Problem: Neurotoxicity (Chemotherapy Effects)  Goal: Neurotoxicity Symptom Control  Outcome: Ongoing, Progressing     Problem: Neutropenia (Chemotherapy Effects)  Goal: Absence of Infection  Outcome: Ongoing, Progressing  Intervention: Prevent Infection and Maximize Resistance  Flowsheets (Taken 8/10/2022 1311)  Infection Prevention:   environmental surveillance performed   equipment surfaces disinfected   hand hygiene promoted   personal protective equipment utilized     Problem: Oral Mucositis (Chemotherapy Effects)  Goal: Improved Oral Mucous Membrane Integrity  Outcome: Ongoing, Progressing     Problem: Thrombocytopenia Bleeding Risk (Chemotherapy Effects)  Goal: Absence of Bleeding  Outcome: Ongoing, Progressing  Intervention: Minimize Bleeding Risk  Flowsheets (Taken 8/10/2022 1311)  Bleeding Precautions:   blood pressure closely monitored   monitored for signs of bleeding     Problem: Fall Injury Risk  Goal: Absence of Fall and Fall-Related Injury  Outcome: Ongoing, Progressing  Intervention: Identify and Manage Contributors  Flowsheets (Taken 8/10/2022 1311)  Self-Care Promotion:   independence encouraged   safe use of adaptive equipment encouraged   BADL personal objects within reach   BADL personal routines maintained  Medication Review/Management: medications reviewed  Intervention: Promote Injury-Free Environment  Flowsheets (Taken 8/10/2022 1311)  Safety Promotion/Fall Prevention:   assistive device/personal item within reach    Fall Risk reviewed with patient/family   medications reviewed   in recliner, wheels locked   instructed to call staff for mobility

## 2022-08-10 NOTE — PROGRESS NOTES
Palliative Referral nurse note    Nurse reached out to pt to schedule apt, his caretaker and  Ms Bhavya accepted the call and discussed the apt, she stated his dgt brings him to his visit, if the date/time conflicts with other apt she knows to call office and ask to speak with adriana palliative nurse.

## 2022-08-10 NOTE — PROGRESS NOTES
Treatment plan was reviewed for appropriateness, including indication, dosing, schedule, premeds/antiemetics, and necessary supportive care.    Patient on cycle 1 day 1, anticipated treatment date 8/10  Patient had any treatment toxicity on prior cycles?: NA    CBC  Recent Labs     08/08/22  1310   WBC 6.73   GRAN 62.1  4.2   HGB 10.0*          Metabolic Panel:  Recent Labs     08/08/22  1310      K 4.0   CL 98   CO2 27   BUN 12   CREATININE 0.7   CALCIUM 9.4   ALBUMIN 2.4*       Estimated Creatinine Clearance: 51 mL/min (based on SCr of 0.7 mg/dL).    Liver Function:  Recent Labs     08/08/22  1310   AST 20   ALT 17   ALKPHOS 151*   BILITOT 0.3       Glucose:  Glucose   Date Value Ref Range Status   08/08/2022 107 70 - 110 mg/dL Final   05/06/2022 109 70 - 110 mg/dL Final       Vitals  Wt Readings from Last 3 Encounters:   08/08/22 48.5 kg (106 lb 14.8 oz)   07/18/22 50.2 kg (110 lb 10.7 oz)   07/18/22 50.2 kg (110 lb 11.2 oz)     Temp Readings from Last 3 Encounters:   08/08/22 98.2 °F (36.8 °C)   07/18/22 97.7 °F (36.5 °C)   07/18/22 97.5 °F (36.4 °C) (Temporal)     BP Readings from Last 3 Encounters:   08/08/22 120/68   07/18/22 134/70   07/18/22 113/66     Pulse Readings from Last 3 Encounters:   08/08/22 69   07/18/22 76   07/18/22 83         Other labs of note:  Iron deficient- receiving IV iron    CBC/CMP +/- other required labs appropriate for next treatment cycle.   No dose adjustments recommended.   Monitor for QTc prolongation with multiple antiemetics    Charlotte Pastor McArdle, PharmD  Clinical Oncology Pharmacist   [phone]

## 2022-08-10 NOTE — PLAN OF CARE
Day 2 of outpatient xrt to the lung. New start this week. Skin care & side effects have been reviewed. Will continue to monitor.

## 2022-08-11 ENCOUNTER — TELEPHONE (OUTPATIENT)
Dept: INFUSION THERAPY | Facility: HOSPITAL | Age: 87
End: 2022-08-11
Payer: MEDICAID

## 2022-08-11 PROCEDURE — 77014 HC CT GUIDANCE RADIATION THERAPY FLDS PLACEMENT: CPT | Mod: TC | Performed by: RADIOLOGY

## 2022-08-11 PROCEDURE — 77014 PR  CT GUIDANCE PLACEMENT RAD THERAPY FIELDS: ICD-10-PCS | Mod: 26,,, | Performed by: RADIOLOGY

## 2022-08-11 PROCEDURE — 77014 PR  CT GUIDANCE PLACEMENT RAD THERAPY FIELDS: CPT | Mod: 26,,, | Performed by: RADIOLOGY

## 2022-08-11 PROCEDURE — 77386 HC IMRT, COMPLEX: CPT | Performed by: RADIOLOGY

## 2022-08-11 NOTE — TELEPHONE ENCOUNTER
Patient does not speak English. Spoke with patient's cousin, Bhavya, who states patient is doing well. Cousin states the prescription for Marinol was never sent in to pharmacy. Spoke with Dr. Paiz's nurse, Yesenia MURDOCK, who confirmed prescription was faxed to pharmacy on 7/25/22. Printed copy has been given to radiation nurses for patient to  and give to pharmacy. Cousin made aware. Cousin verbalized understanding and states she will inform patient and his daughter.

## 2022-08-12 ENCOUNTER — TELEPHONE (OUTPATIENT)
Dept: HEMATOLOGY/ONCOLOGY | Facility: CLINIC | Age: 87
End: 2022-08-12
Payer: MEDICAID

## 2022-08-12 PROCEDURE — 77386 HC IMRT, COMPLEX: CPT | Performed by: RADIOLOGY

## 2022-08-12 PROCEDURE — 77014 PR  CT GUIDANCE PLACEMENT RAD THERAPY FIELDS: ICD-10-PCS | Mod: 26,,, | Performed by: RADIOLOGY

## 2022-08-12 PROCEDURE — 77014 PR  CT GUIDANCE PLACEMENT RAD THERAPY FIELDS: CPT | Mod: 26,,, | Performed by: RADIOLOGY

## 2022-08-12 PROCEDURE — 77014 HC CT GUIDANCE RADIATION THERAPY FLDS PLACEMENT: CPT | Mod: TC | Performed by: RADIOLOGY

## 2022-08-12 NOTE — TELEPHONE ENCOUNTER
----- Message from Rena Martel sent at 8/12/2022 10:17 AM CDT -----  Contact: Ms. Latif Pts Friend Mobile# [phone]  Ms. Latif patients friend would like a call back about a private matter pertaining to the patient please.    Comment: Ms. Latif said that its an emergency.

## 2022-08-12 NOTE — TELEPHONE ENCOUNTER
Nurse called Bhavya back to let her know that dr. tejeda will try and put another medication in but this also may not get approved, to try nutritional supplements as well as small frequent meals. No answer, nurse left detailed vm

## 2022-08-12 NOTE — TELEPHONE ENCOUNTER
Bhavya, pt friend called stating medication for appetite (marinol) is not covered by insurance and wanting provider to try and call something else please. Nurse will route to MD

## 2022-08-12 NOTE — TELEPHONE ENCOUNTER
----- Message from Louise Church sent at 8/12/2022  1:51 PM CDT -----  Bhavya the pt's  would like Carmelina to call her back at [phone]. The call is pertaining to a prescription that was called in. Thx. EL

## 2022-08-15 ENCOUNTER — INFUSION (OUTPATIENT)
Dept: INFUSION THERAPY | Facility: HOSPITAL | Age: 87
End: 2022-08-15
Attending: INTERNAL MEDICINE
Payer: MEDICAID

## 2022-08-15 VITALS
DIASTOLIC BLOOD PRESSURE: 49 MMHG | OXYGEN SATURATION: 99 % | SYSTOLIC BLOOD PRESSURE: 95 MMHG | HEART RATE: 83 BPM | RESPIRATION RATE: 16 BRPM | TEMPERATURE: 98 F

## 2022-08-15 DIAGNOSIS — D50.0 IRON DEFICIENCY ANEMIA DUE TO CHRONIC BLOOD LOSS: Primary | ICD-10-CM

## 2022-08-15 PROCEDURE — 77014 HC CT GUIDANCE RADIATION THERAPY FLDS PLACEMENT: CPT | Mod: TC | Performed by: RADIOLOGY

## 2022-08-15 PROCEDURE — 96365 THER/PROPH/DIAG IV INF INIT: CPT

## 2022-08-15 PROCEDURE — 77386 HC IMRT, COMPLEX: CPT | Performed by: RADIOLOGY

## 2022-08-15 PROCEDURE — 63600175 PHARM REV CODE 636 W HCPCS: Mod: JG | Performed by: INTERNAL MEDICINE

## 2022-08-15 PROCEDURE — 25000003 PHARM REV CODE 250: Performed by: INTERNAL MEDICINE

## 2022-08-15 PROCEDURE — 77014 PR  CT GUIDANCE PLACEMENT RAD THERAPY FIELDS: CPT | Mod: 26,,, | Performed by: RADIOLOGY

## 2022-08-15 PROCEDURE — 77014 PR  CT GUIDANCE PLACEMENT RAD THERAPY FIELDS: ICD-10-PCS | Mod: 26,,, | Performed by: RADIOLOGY

## 2022-08-15 RX ORDER — SODIUM CHLORIDE 0.9 % (FLUSH) 0.9 %
10 SYRINGE (ML) INJECTION
Status: DISCONTINUED | OUTPATIENT
Start: 2022-08-15 | End: 2022-08-15 | Stop reason: HOSPADM

## 2022-08-15 RX ADMIN — SODIUM CHLORIDE: 9 INJECTION, SOLUTION INTRAVENOUS at 11:08

## 2022-08-15 RX ADMIN — FERUMOXYTOL 510 MG: 510 INJECTION INTRAVENOUS at 11:08

## 2022-08-15 NOTE — PLAN OF CARE
Problem: Anemia  Goal: Anemia Symptom Improvement  Outcome: Ongoing, Progressing  Intervention: Monitor and Manage Anemia  Flowsheets (Taken 8/15/2022 1130)  Safety Promotion/Fall Prevention:   assistive device/personal item within reach   Fall Risk reviewed with patient/family   medications reviewed   in recliner, wheels locked   instructed to call staff for mobility  Fatigue Management: frequent rest breaks encouraged     Problem: Adult Inpatient Plan of Care  Goal: Plan of Care Review  Outcome: Ongoing, Progressing  Flowsheets (Taken 8/15/2022 1130)  Plan of Care Reviewed With:   patient   family  Goal: Patient-Specific Goal (Individualized)  Outcome: Ongoing, Progressing  Flowsheets (Taken 8/15/2022 1130)  Anxieties, Fears or Concerns: denies  Individualized Care Needs: fee elevated, warm blanket and pillow provided  Patient-Specific Goals (Include Timeframe): tolerate infusion today

## 2022-08-16 PROCEDURE — 77014 HC CT GUIDANCE RADIATION THERAPY FLDS PLACEMENT: CPT | Mod: TC | Performed by: RADIOLOGY

## 2022-08-16 PROCEDURE — 77014 PR  CT GUIDANCE PLACEMENT RAD THERAPY FIELDS: CPT | Mod: 26,,, | Performed by: RADIOLOGY

## 2022-08-16 PROCEDURE — 77014 PR  CT GUIDANCE PLACEMENT RAD THERAPY FIELDS: ICD-10-PCS | Mod: 26,,, | Performed by: RADIOLOGY

## 2022-08-16 PROCEDURE — 77386 HC IMRT, COMPLEX: CPT | Performed by: RADIOLOGY

## 2022-08-16 PROCEDURE — 77336 RADIATION PHYSICS CONSULT: CPT | Performed by: RADIOLOGY

## 2022-08-17 ENCOUNTER — LAB VISIT (OUTPATIENT)
Dept: LAB | Facility: HOSPITAL | Age: 87
End: 2022-08-17
Attending: INTERNAL MEDICINE
Payer: MEDICAID

## 2022-08-17 ENCOUNTER — OFFICE VISIT (OUTPATIENT)
Dept: HEMATOLOGY/ONCOLOGY | Facility: CLINIC | Age: 87
End: 2022-08-17
Payer: MEDICAID

## 2022-08-17 ENCOUNTER — DOCUMENTATION ONLY (OUTPATIENT)
Dept: RADIATION ONCOLOGY | Facility: CLINIC | Age: 87
End: 2022-08-17
Payer: MEDICAID

## 2022-08-17 ENCOUNTER — INFUSION (OUTPATIENT)
Dept: INFUSION THERAPY | Facility: HOSPITAL | Age: 87
End: 2022-08-17
Attending: INTERNAL MEDICINE
Payer: MEDICAID

## 2022-08-17 VITALS
TEMPERATURE: 97 F | DIASTOLIC BLOOD PRESSURE: 60 MMHG | RESPIRATION RATE: 16 BRPM | WEIGHT: 103.19 LBS | HEART RATE: 79 BPM | BODY MASS INDEX: 17.17 KG/M2 | OXYGEN SATURATION: 95 % | SYSTOLIC BLOOD PRESSURE: 112 MMHG

## 2022-08-17 VITALS
BODY MASS INDEX: 17.19 KG/M2 | HEIGHT: 65 IN | SYSTOLIC BLOOD PRESSURE: 112 MMHG | WEIGHT: 103.19 LBS | TEMPERATURE: 98 F | HEART RATE: 94 BPM | DIASTOLIC BLOOD PRESSURE: 67 MMHG | OXYGEN SATURATION: 94 %

## 2022-08-17 DIAGNOSIS — Z72.0 TOBACCO ABUSE: ICD-10-CM

## 2022-08-17 DIAGNOSIS — Z79.899 IMMUNODEFICIENCY DUE TO CHEMOTHERAPY: Primary | ICD-10-CM

## 2022-08-17 DIAGNOSIS — C34.91 SQUAMOUS CELL CARCINOMA OF RIGHT LUNG: ICD-10-CM

## 2022-08-17 DIAGNOSIS — T45.1X5A IMMUNODEFICIENCY DUE TO CHEMOTHERAPY: Primary | ICD-10-CM

## 2022-08-17 DIAGNOSIS — C34.91 SQUAMOUS CELL CARCINOMA OF RIGHT LUNG: Primary | ICD-10-CM

## 2022-08-17 DIAGNOSIS — T45.1X5A IMMUNODEFICIENCY DUE TO CHEMOTHERAPY: ICD-10-CM

## 2022-08-17 DIAGNOSIS — D84.821 IMMUNODEFICIENCY DUE TO CHEMOTHERAPY: ICD-10-CM

## 2022-08-17 DIAGNOSIS — R63.0 LOSS OF APPETITE: ICD-10-CM

## 2022-08-17 DIAGNOSIS — Z79.899 IMMUNODEFICIENCY DUE TO CHEMOTHERAPY: ICD-10-CM

## 2022-08-17 DIAGNOSIS — D84.821 IMMUNODEFICIENCY DUE TO CHEMOTHERAPY: Primary | ICD-10-CM

## 2022-08-17 LAB
ALBUMIN SERPL BCP-MCNC: 2.6 G/DL (ref 3.5–5.2)
ALP SERPL-CCNC: 160 U/L (ref 55–135)
ALT SERPL W/O P-5'-P-CCNC: 18 U/L (ref 10–44)
ANION GAP SERPL CALC-SCNC: 12 MMOL/L (ref 8–16)
AST SERPL-CCNC: 20 U/L (ref 10–40)
BASOPHILS # BLD AUTO: 0.01 K/UL (ref 0–0.2)
BASOPHILS NFR BLD: 0.2 % (ref 0–1.9)
BILIRUB SERPL-MCNC: 0.6 MG/DL (ref 0.1–1)
BUN SERPL-MCNC: 14 MG/DL (ref 8–23)
CALCIUM SERPL-MCNC: 9.9 MG/DL (ref 8.7–10.5)
CHLORIDE SERPL-SCNC: 99 MMOL/L (ref 95–110)
CO2 SERPL-SCNC: 29 MMOL/L (ref 23–29)
CREAT SERPL-MCNC: 0.7 MG/DL (ref 0.5–1.4)
DIFFERENTIAL METHOD: ABNORMAL
EOSINOPHIL # BLD AUTO: 0 K/UL (ref 0–0.5)
EOSINOPHIL NFR BLD: 0.2 % (ref 0–8)
ERYTHROCYTE [DISTWIDTH] IN BLOOD BY AUTOMATED COUNT: 17.1 % (ref 11.5–14.5)
EST. GFR  (NO RACE VARIABLE): >60 ML/MIN/1.73 M^2
GLUCOSE SERPL-MCNC: 122 MG/DL (ref 70–110)
HCT VFR BLD AUTO: 33 % (ref 40–54)
HGB BLD-MCNC: 10 G/DL (ref 14–18)
IMM GRANULOCYTES # BLD AUTO: 0.06 K/UL (ref 0–0.04)
IMM GRANULOCYTES NFR BLD AUTO: 1 % (ref 0–0.5)
LYMPHOCYTES # BLD AUTO: 0.6 K/UL (ref 1–4.8)
LYMPHOCYTES NFR BLD: 9.7 % (ref 18–48)
MAGNESIUM SERPL-MCNC: 2.2 MG/DL (ref 1.6–2.6)
MCH RBC QN AUTO: 26.2 PG (ref 27–31)
MCHC RBC AUTO-ENTMCNC: 30.3 G/DL (ref 32–36)
MCV RBC AUTO: 87 FL (ref 82–98)
MONOCYTES # BLD AUTO: 0.5 K/UL (ref 0.3–1)
MONOCYTES NFR BLD: 8.9 % (ref 4–15)
NEUTROPHILS # BLD AUTO: 4.9 K/UL (ref 1.8–7.7)
NEUTROPHILS NFR BLD: 80 % (ref 38–73)
NRBC BLD-RTO: 0 /100 WBC
PLATELET # BLD AUTO: 146 K/UL (ref 150–450)
PMV BLD AUTO: 8.7 FL (ref 9.2–12.9)
POTASSIUM SERPL-SCNC: 3.7 MMOL/L (ref 3.5–5.1)
PROT SERPL-MCNC: 7.1 G/DL (ref 6–8.4)
RBC # BLD AUTO: 3.81 M/UL (ref 4.6–6.2)
SODIUM SERPL-SCNC: 140 MMOL/L (ref 136–145)
WBC # BLD AUTO: 6.1 K/UL (ref 3.9–12.7)

## 2022-08-17 PROCEDURE — 77014 PR  CT GUIDANCE PLACEMENT RAD THERAPY FIELDS: CPT | Mod: 26,,, | Performed by: RADIOLOGY

## 2022-08-17 PROCEDURE — 77386 HC IMRT, COMPLEX: CPT | Performed by: RADIOLOGY

## 2022-08-17 PROCEDURE — 96375 TX/PRO/DX INJ NEW DRUG ADDON: CPT

## 2022-08-17 PROCEDURE — 83735 ASSAY OF MAGNESIUM: CPT | Performed by: NURSE PRACTITIONER

## 2022-08-17 PROCEDURE — 1159F PR MEDICATION LIST DOCUMENTED IN MEDICAL RECORD: ICD-10-PCS | Mod: CPTII,,, | Performed by: INTERNAL MEDICINE

## 2022-08-17 PROCEDURE — 85025 COMPLETE CBC W/AUTO DIFF WBC: CPT | Performed by: INTERNAL MEDICINE

## 2022-08-17 PROCEDURE — 99999 PR PBB SHADOW E&M-EST. PATIENT-LVL III: CPT | Mod: PBBFAC,,, | Performed by: INTERNAL MEDICINE

## 2022-08-17 PROCEDURE — 99999 PR PBB SHADOW E&M-EST. PATIENT-LVL III: ICD-10-PCS | Mod: PBBFAC,,, | Performed by: INTERNAL MEDICINE

## 2022-08-17 PROCEDURE — 96413 CHEMO IV INFUSION 1 HR: CPT

## 2022-08-17 PROCEDURE — 3288F PR FALLS RISK ASSESSMENT DOCUMENTED: ICD-10-PCS | Mod: CPTII,,, | Performed by: INTERNAL MEDICINE

## 2022-08-17 PROCEDURE — 1101F PR PT FALLS ASSESS DOC 0-1 FALLS W/OUT INJ PAST YR: ICD-10-PCS | Mod: CPTII,,, | Performed by: INTERNAL MEDICINE

## 2022-08-17 PROCEDURE — 80053 COMPREHEN METABOLIC PANEL: CPT | Performed by: INTERNAL MEDICINE

## 2022-08-17 PROCEDURE — 1126F AMNT PAIN NOTED NONE PRSNT: CPT | Mod: CPTII,,, | Performed by: INTERNAL MEDICINE

## 2022-08-17 PROCEDURE — 99213 OFFICE O/P EST LOW 20 MIN: CPT | Mod: PBBFAC | Performed by: INTERNAL MEDICINE

## 2022-08-17 PROCEDURE — 1159F MED LIST DOCD IN RCRD: CPT | Mod: CPTII,,, | Performed by: INTERNAL MEDICINE

## 2022-08-17 PROCEDURE — 63600175 PHARM REV CODE 636 W HCPCS: Performed by: INTERNAL MEDICINE

## 2022-08-17 PROCEDURE — 3288F FALL RISK ASSESSMENT DOCD: CPT | Mod: CPTII,,, | Performed by: INTERNAL MEDICINE

## 2022-08-17 PROCEDURE — 77014 PR  CT GUIDANCE PLACEMENT RAD THERAPY FIELDS: ICD-10-PCS | Mod: 26,,, | Performed by: RADIOLOGY

## 2022-08-17 PROCEDURE — 25000003 PHARM REV CODE 250: Performed by: INTERNAL MEDICINE

## 2022-08-17 PROCEDURE — 96417 CHEMO IV INFUS EACH ADDL SEQ: CPT

## 2022-08-17 PROCEDURE — 36415 COLL VENOUS BLD VENIPUNCTURE: CPT | Performed by: NURSE PRACTITIONER

## 2022-08-17 PROCEDURE — 1101F PT FALLS ASSESS-DOCD LE1/YR: CPT | Mod: CPTII,,, | Performed by: INTERNAL MEDICINE

## 2022-08-17 PROCEDURE — 77014 HC CT GUIDANCE RADIATION THERAPY FLDS PLACEMENT: CPT | Mod: TC | Performed by: RADIOLOGY

## 2022-08-17 PROCEDURE — 96367 TX/PROPH/DG ADDL SEQ IV INF: CPT

## 2022-08-17 PROCEDURE — 99215 OFFICE O/P EST HI 40 MIN: CPT | Mod: 25,S$PBB,, | Performed by: INTERNAL MEDICINE

## 2022-08-17 PROCEDURE — 99215 PR OFFICE/OUTPT VISIT, EST, LEVL V, 40-54 MIN: ICD-10-PCS | Mod: 25,S$PBB,, | Performed by: INTERNAL MEDICINE

## 2022-08-17 PROCEDURE — 1126F PR PAIN SEVERITY QUANTIFIED, NO PAIN PRESENT: ICD-10-PCS | Mod: CPTII,,, | Performed by: INTERNAL MEDICINE

## 2022-08-17 RX ORDER — SODIUM CHLORIDE 0.9 % (FLUSH) 0.9 %
10 SYRINGE (ML) INJECTION
Status: CANCELLED | OUTPATIENT
Start: 2022-08-17

## 2022-08-17 RX ORDER — DIPHENHYDRAMINE HYDROCHLORIDE 50 MG/ML
50 INJECTION INTRAMUSCULAR; INTRAVENOUS ONCE AS NEEDED
Status: CANCELLED | OUTPATIENT
Start: 2022-08-17

## 2022-08-17 RX ORDER — HEPARIN 100 UNIT/ML
500 SYRINGE INTRAVENOUS
Status: CANCELLED | OUTPATIENT
Start: 2022-08-17

## 2022-08-17 RX ORDER — EPINEPHRINE 0.3 MG/.3ML
0.3 INJECTION SUBCUTANEOUS ONCE AS NEEDED
Status: CANCELLED | OUTPATIENT
Start: 2022-08-17

## 2022-08-17 RX ORDER — FAMOTIDINE 10 MG/ML
20 INJECTION INTRAVENOUS
Status: CANCELLED | OUTPATIENT
Start: 2022-08-17

## 2022-08-17 RX ORDER — FAMOTIDINE 10 MG/ML
20 INJECTION INTRAVENOUS
Status: COMPLETED | OUTPATIENT
Start: 2022-08-17 | End: 2022-08-17

## 2022-08-17 RX ORDER — MEGESTROL ACETATE 40 MG/1
40 TABLET ORAL 4 TIMES DAILY
Qty: 120 TABLET | Refills: 0 | Status: SHIPPED | OUTPATIENT
Start: 2022-08-17 | End: 2022-08-25

## 2022-08-17 RX ADMIN — DIPHENHYDRAMINE HYDROCHLORIDE 50 MG: 50 INJECTION, SOLUTION INTRAMUSCULAR; INTRAVENOUS at 12:08

## 2022-08-17 RX ADMIN — CARBOPLATIN 155 MG: 10 INJECTION, SOLUTION INTRAVENOUS at 02:08

## 2022-08-17 RX ADMIN — PACLITAXEL 66 MG: 6 INJECTION, SOLUTION INTRAVENOUS at 01:08

## 2022-08-17 RX ADMIN — DEXAMETHASONE SODIUM PHOSPHATE 0.25 MG: 4 INJECTION, SOLUTION INTRA-ARTICULAR; INTRALESIONAL; INTRAMUSCULAR; INTRAVENOUS; SOFT TISSUE at 12:08

## 2022-08-17 RX ADMIN — SODIUM CHLORIDE: 0.9 INJECTION, SOLUTION INTRAVENOUS at 11:08

## 2022-08-17 RX ADMIN — FAMOTIDINE 20 MG: 10 INJECTION INTRAVENOUS at 12:08

## 2022-08-17 NOTE — PROGRESS NOTES
Subjective:      DATE OF VISIT: 8/17/22     ?  Patient ID:?Real Pagan is a 87 y.o. male.?? MR#: [id number]   ?   REFERRING PROVIDER: No referring provider defined for this encounter.     ? Primary Care Providers:  Shruthi Burr MD, MD (General)     CHIEF COMPLAINT: ?Referral for newly diagnosed lung cancer  ?   ONCOLOGIC DIAGNOSIS: Right lung squamous cell carcinoma [id number], stage IIIB  ?   CURRENT TREATMENT:  Plan for concurrent chemoradiation, see below    PAST TREATMENT: Biopsy  ?   ONCOLOGIC HISTORY:   5/6/22: Pt presented to PCP with complaints of unintentional weight loss, fatigue, non-productive cough, dyspnea on exertion, and shortness of breath. CXR demonstrated cavitary mass-like density seen projecting over the right hilar region.    5/24/22: PET CT demonstrated 6.2cm cavitary mass in right lung extending to right hilum and abutting pleura, avid max SUV 35, mildly enlarged right paratracheal node low level uptake max SUV 2.7, no distant metastases noted.    7/1/22: bronchoscopy - right lower lobe transbronchial and bronchial biopsies positive for squamous cell carcinoma  ?   Oncology History   Squamous cell carcinoma of right lung   7/11/2022 Initial Diagnosis    Squamous cell carcinoma of right lung     7/18/2022 Cancer Staged    Staging form: Lung, AJCC 8th Edition  - Clinical stage from 7/18/2022: Stage IIIB (cT3, cN2, cM0)     8/10/2022 -  Chemotherapy    Treatment Summary   Plan Name: OP NSCLC PACLITAXEL + CARBOPLATIN (AUC) QW + RADIATION  Treatment Goal: Curative  Status: Active  Start Date: 8/10/2022  End Date: 9/14/2022 (Planned)  Provider: Lindsay Paiz MD  Chemotherapy: CARBOplatin (PARAPLATIN) 155 mg in sodium chloride 0.9% 265.5 mL chemo infusion, 155 mg (100 % of original dose 155.6 mg), Intravenous, Clinic/HOD 1 time, 1 of 6 cycles  Dose modification:   (original dose 155.6 mg, Cycle 1),   (Cycle 2)  Administration: 155 mg (8/10/2022)  PACLitaxeL (TAXOL) 45 mg/m2  = 66 mg in sodium chloride 0.9% 100 mL chemo infusion, 45 mg/m2 = 66 mg, Intravenous, Clinic/HOD 1 time, 1 of 6 cycles  Administration: 66 mg (8/10/2022)       Cancer Staging  Right lung squamous cell carcinoma [id number], stage IIIB    HPI:  Real Pagan is a 86yo male patient with a long smoking history (69 pack years), who presents for management of newly diagnosed Stage IIIB squamous cell carcinoma of the lung.     Currently on concurrent chemotherapy and radiation, presents today for cycle 2 day 1 of carboplatin Taxol. He denies hemoptysis, chest pain, dysphagia/odynophagia, or any other symptoms.    Review of Systems   Constitutional: Positive for activity change, fatigue and unexpected weight change.   HENT: Negative.    Eyes: Negative.    Cardiovascular: Negative.    Gastrointestinal: Negative.    Endocrine: Negative.    Genitourinary: Negative.    Musculoskeletal: Negative.    Skin: Negative.    Allergic/Immunologic: Negative.    Neurological: Negative.    Hematological: Negative.    Psychiatric/Behavioral: The patient is nervous/anxious.        A comprehensive 14-point review of systems was reviewed with patient and was negative other than as specified above.   ?   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     PAST SURGICAL HISTORY:   Past Surgical History:   Procedure Laterality Date    BRONCHOSCOPY N/A 7/1/2022    Procedure: Bronchoscopy;  Surgeon: Jey Scott MD;  Location: [facility];  Service: Endoscopy;  Laterality: N/A;    CATARACT EXTRACTION, BILATERAL        ?   ALLERGIES:   Allergies as of 08/17/2022    (No Known Allergies)      ?   MEDICATIONS:?   Outpatient Medications Marked as Taking for the 8/17/22 encounter (Office Visit) with Yared Jama MD   Medication Sig Dispense Refill    albuterol (PROVENTIL) 2.5 mg /3 mL (0.083 %) nebulizer solution  Take 3 mLs (2.5 mg total) by nebulization every 4 to 6 hours as needed for Wheezing or Shortness of Breath. 360 mL 11    albuterol (PROVENTIL/VENTOLIN HFA) 90 mcg/actuation inhaler Inhale 2 puffs into the lungs every 6 (six) hours as needed. 18 g 0    dronabinoL (MARINOL) 2.5 MG capsule Take 1 capsule (2.5 mg total) by mouth 2 (two) times daily before meals. 60 capsule 2    ferrous sulfate (FEOSOL) 325 mg (65 mg iron) Tab tablet Take 325 mg by mouth daily with breakfast.      ondansetron (ZOFRAN-ODT) 8 MG TbDL Take 1 tablet (8 mg total) by mouth every 12 (twelve) hours as needed. 30 tablet 1    predniSONE (DELTASONE) 20 MG tablet Prednisone 60 mg/ day for 3 days, 40 mg/day for 3 days,20 mg/ day for 3 days, (1/2 tablet )10 mg a day for 3 days. 20 tablet 0    prochlorperazine (COMPAZINE) 5 MG tablet Take 1 tablet (5 mg total) by mouth every 6 (six) hours as needed for Nausea. 30 tablet 1    promethazine-codeine 6.25-10 mg/5 ml (PHENERGAN WITH CODEINE) 6.25-10 mg/5 mL syrup Take 5 mLs by mouth every 6 (six) hours as needed for Cough. 240 mL 0    rosuvastatin (CRESTOR) 10 MG tablet TAKE 1 TABLET(10 MG) BY MOUTH EVERY DAY 90 tablet 0      ?   SOCIAL HISTORY:?   Social History     Tobacco Use    Smoking status: Current Every Day Smoker     Packs/day: 0.25     Years: 69.00     Pack years: 17.25     Types: Cigarettes     Start date: 1/1/1953    Smokeless tobacco: Never Used   Substance Use Topics    Alcohol use: Yes      FAMILY HISTORY:   family history includes Cancer in his brother; Diabetes in his daughter.   ?     Objective:      Vitals:    08/17/22 1035   BP: 112/67   Pulse: 94   Temp: 97.6 °F (36.4 °C)      ?   ECOG:?2  General appearance: In no acute distress.   Head, eyes, ears, nose, and throat: Pupils round and equally reactive to light. Oropharynx clear with moist mucous membranes.   Cardiovascular: Regular rate and rhythm, S1, S2, no audible murmurs.   Respiratory: Normal  Abdomen: Bowel sounds  present, soft, nontender, nondistended.   Extremities: Warm, without edema.   Neurologic: Alert and oriented. Grossly normal strength, coordination, and gait.   Skin: No rashes, ecchymoses or petechial lesion.   ?  ?   Laboratory:  ?   Lab Visit on 08/17/2022   Component Date Value Ref Range Status    WBC 08/17/2022 6.10  3.90 - 12.70 K/uL Final    RBC 08/17/2022 3.81 (A) 4.60 - 6.20 M/uL Final    Hemoglobin 08/17/2022 10.0 (A) 14.0 - 18.0 g/dL Final    Hematocrit 08/17/2022 33.0 (A) 40.0 - 54.0 % Final    MCV 08/17/2022 87  82 - 98 fL Final    MCH 08/17/2022 26.2 (A) 27.0 - 31.0 pg Final    MCHC 08/17/2022 30.3 (A) 32.0 - 36.0 g/dL Final    RDW 08/17/2022 17.1 (A) 11.5 - 14.5 % Final    Platelets 08/17/2022 146 (A) 150 - 450 K/uL Final    MPV 08/17/2022 8.7 (A) 9.2 - 12.9 fL Final    Immature Granulocytes 08/17/2022 1.0 (A) 0.0 - 0.5 % Final    Gran # (ANC) 08/17/2022 4.9  1.8 - 7.7 K/uL Final    Immature Grans (Abs) 08/17/2022 0.06 (A) 0.00 - 0.04 K/uL Final    Lymph # 08/17/2022 0.6 (A) 1.0 - 4.8 K/uL Final    Mono # 08/17/2022 0.5  0.3 - 1.0 K/uL Final    Eos # 08/17/2022 0.0  0.0 - 0.5 K/uL Final    Baso # 08/17/2022 0.01  0.00 - 0.20 K/uL Final    nRBC 08/17/2022 0  0 /100 WBC Final    Gran % 08/17/2022 80.0 (A) 38.0 - 73.0 % Final    Lymph % 08/17/2022 9.7 (A) 18.0 - 48.0 % Final    Mono % 08/17/2022 8.9  4.0 - 15.0 % Final    Eosinophil % 08/17/2022 0.2  0.0 - 8.0 % Final    Basophil % 08/17/2022 0.2  0.0 - 1.9 % Final    Differential Method 08/17/2022 Automated   Final       Imaging:    No results found for this or any previous visit (from the past 2160 hour(s)).  Results for orders placed or performed during the hospital encounter of 07/28/22 (from the past 2160 hour(s))   MRI Brain W WO Contrast    Impression    1. No evidence of metastatic disease to the brain.  2. Mild to moderate patchy T2 FLAIR hyperintensity within the cerebral white matter likely relates to chronic  microvascular ischemia.  3. Mild generalized atrophy.  4. Changes of chronic left maxillary sinusitis.      Electronically signed by: Gerard Hannah Jr., MD  Date:    07/28/2022  Time:    23:39     Results for orders placed or performed during the hospital encounter of 05/24/22 (from the past 2160 hour(s))   NM PET CT Routine FDG    Impression    Large cavitary FDG avid right lung mass.  Malignancy versus infectious etiology..  Mediastinal lymph nodes as above.  Other findings as above.      Electronically signed by: Brian Rivera MD  Date:    05/24/2022  Time:    12:58     PET CT 5/25/22:  Head/neck: Normal physiologic activity present.    Chest: 6.2 x 5.2 cm cavitary mass lesion within the right lung which extends to the right hilum and abuts the posteromedial pleura.  Lesion is FDG avid within SUV max of 35.  Adjacent atelectasis/airspace opacity present.  No other FDG avid pulmonary nodule appreciated.  Correlate with diagnostic CT chest imaging.     No hilar FDG avid lymph nodes.  No axillary FDG avid lymph nodes.  Mildly enlarged right paratracheal lymph no demonstrating low level activity maintaining fatty hilum. Subcentimeter short axis more cranial right paratracheal lymph node present demonstrating a SUV max 2.7.  Subcentimeter subcarinal lymph node with low level activity.     Bilateral gynecomastia noted.     Abdomen/pelvis: Physiologic activity without concerning FDG avid focus.     Renal cysts present.  Prostate enlargement.  Left inguinal hernia containing a portion of the sigmoid colon.  No evidence of obstruction or strangulation.     Skeletal/subcutaneous structures: No FDG avid osseous lesion.     Impression:     Large cavitary FDG avid right lung mass.  Malignancy versus infectious etiology..  Mediastinal lymph nodes as above.  Other findings as above.    Pathology:    1. AND 2. RIGHT LOWER LOBE TRANSBRONCHIAL AND BRONCHIAL BIOPSIES:   POSITIVE FOR SQUAMOUS CARCINOMA    ?   Assessment/Plan:    Immunodeficiency due to chemotherapy  -     CBC Auto Differential; Future; Expected date: 08/17/2022  -     Comprehensive Metabolic Panel; Future; Expected date: 08/17/2022    Squamous cell carcinoma of right lung  -     CBC Auto Differential; Future; Expected date: 08/17/2022  -     Comprehensive Metabolic Panel; Future; Expected date: 08/17/2022    Tobacco abuse  -     CBC Auto Differential; Future; Expected date: 08/17/2022  -     Comprehensive Metabolic Panel; Future; Expected date: 08/17/2022    Loss of appetite  -     megestroL (MEGACE) 40 MG Tab; Take 1 tablet (40 mg total) by mouth 4 (four) times daily.  Dispense: 120 tablet; Refill: 0       1. Immunodeficiency due to chemotherapy    2. Squamous cell carcinoma of right lung    3. Tobacco abuse    4. Loss of appetite          Plan:     Problem List Items Addressed This Visit        Oncology    Squamous cell carcinoma of right lung    Current Assessment & Plan     Right lung squamous cell carcinoma [id number], stage IIIB, currently on concurrent chemoRT. So far tolerating well. Reviewed labs, no concerning cytopenia, will proceed with C2D1. RTC in 1 week with repeat labs or sooner if needed.               Other    Tobacco abuse    Current Assessment & Plan     Counseled on tobacco cessation.            Loss of appetite    Current Assessment & Plan     marinol not covered by his insurance. Will call in megace. We discussed the sides effects including blood clot. He understands and willing to take the risk.              Other Visit Diagnoses     Immunodeficiency due to chemotherapy    -  Primary      Squamous cell carcinoma of right lung  Right lung squamous cell carcinoma [id number], stage IIIB, currently on concurrent chemoRT. So far tolerating well. Reviewed labs, no concerning cytopenia, will proceed with C2D1. RTC in 1 week with repeat labs or sooner if needed.     Tobacco abuse  Counseled on tobacco cessation.     Loss of appetite  marinol not covered by his  insurance. Will call in megace. We discussed the sides effects including blood clot. He understands and willing to take the risk.     Immunodeficiency due to chemotherapy  -     CBC Auto Differential; Future; Expected date: 08/17/2022  -     Comprehensive Metabolic Panel; Future; Expected date: 08/17/2022    Squamous cell carcinoma of right lung  -     CBC Auto Differential; Future; Expected date: 08/17/2022  -     Comprehensive Metabolic Panel; Future; Expected date: 08/17/2022    Tobacco abuse  -     CBC Auto Differential; Future; Expected date: 08/17/2022  -     Comprehensive Metabolic Panel; Future; Expected date: 08/17/2022    Loss of appetite  -     megestroL (MEGACE) 40 MG Tab; Take 1 tablet (40 mg total) by mouth 4 (four) times daily.  Dispense: 120 tablet; Refill: 0      Yared Jama MD

## 2022-08-17 NOTE — PLAN OF CARE
Day 7 outpatient xrt to lung. denies pain. has SOB sometimes; cough off and on;no chest pain; feet are swelling; has fatigue and low appetite. Will continue to monitor.

## 2022-08-17 NOTE — ASSESSMENT & PLAN NOTE
Right lung squamous cell carcinoma [id number], stage IIIB, currently on concurrent chemoRT. So far tolerating well. Reviewed labs, no concerning cytopenia, will proceed with C2D1. RTC in 1 week with repeat labs or sooner if needed.

## 2022-08-17 NOTE — NURSING
Patient turned over near end of treatment and accidentally pulled the catheter tip out some..Data Unavailable

## 2022-08-17 NOTE — PLAN OF CARE
Problem: Adult Inpatient Plan of Care  Goal: Plan of Care Review  Outcome: Ongoing, Progressing  Flowsheets (Taken 8/17/2022 1157)  Plan of Care Reviewed With:   patient   family  Goal: Patient-Specific Goal (Individualized)  Outcome: Ongoing, Progressing  Flowsheets (Taken 8/17/2022 1157)  Anxieties, Fears or Concerns: none  Individualized Care Needs: feet up, warm blankets  Patient-Specific Goals (Include Timeframe): tolerate treatment  Goal: Optimal Comfort and Wellbeing  Outcome: Ongoing, Progressing     Problem: Fall Injury Risk  Goal: Absence of Fall and Fall-Related Injury  Outcome: Ongoing, Progressing  Intervention: Identify and Manage Contributors  Flowsheets (Taken 8/17/2022 1157)  Self-Care Promotion:   BADL personal objects within reach   BADL personal routines maintained  Medication Review/Management: medications reviewed  Intervention: Promote Injury-Free Environment  Flowsheets (Taken 8/17/2022 1157)  Safety Promotion/Fall Prevention:   nonskid shoes/socks when out of bed   in recliner, wheels locked   family to remain at bedside   room near unit station     Problem: Fatigue  Goal: Improved Activity Tolerance  Outcome: Ongoing, Progressing  Intervention: Promote Improved Energy  Flowsheets (Taken 8/17/2022 1157)  Fatigue Management:   frequent rest breaks encouraged   paced activity encouraged  Sleep/Rest Enhancement: regular sleep/rest pattern promoted

## 2022-08-17 NOTE — NURSING
patient's IV infiltrated. Carboplatin is an irritant. We are following protocol with cold compress and educating patient on protocol

## 2022-08-17 NOTE — NURSING
PATIENT TURNED OVER IN CHAIR. CATHETER DISLODGED AT END OF INFUSION.NOTED MILD SWELLING AT IV SITE AND SMALL AMOUNT OF LIQUID ON PILLOW CASE UNDER ARM (LESS THAN 5 MLS). DRY COLD COMPRESS APPLIED TO SITE, MD NOTIFIED AND CAME ASSESS PATIENT. Dr. Jama came assess and patient and family given verbal and written instructions on care of site. No swelling noted at time of discharge nor any discoloration. VSS. Left with family without any further incidents.

## 2022-08-17 NOTE — DISCHARGE INSTRUCTIONS
[facility]  [address] [facility]  [address]  [phone] phone     [phone] fax  Hours of Operation: Monday- Friday 8:00am- 5:00pm  After hours phone  [phone]  Hematology / Oncology Physicians on call      KP Tiwari Dr., Dr., NP Sydney Prescott, DIANNE Veliz, VIC Mancuso    Please call with any concerns regarding your appointment today.      Apply dry cold compresses for 20-30 minutes at a time, 4 times a day for the first 24-48 hours following extravasation.

## 2022-08-18 ENCOUNTER — PATIENT MESSAGE (OUTPATIENT)
Dept: NUTRITION | Facility: CLINIC | Age: 87
End: 2022-08-18
Payer: MEDICAID

## 2022-08-18 PROCEDURE — 77386 HC IMRT, COMPLEX: CPT | Performed by: RADIOLOGY

## 2022-08-18 PROCEDURE — 77014 PR  CT GUIDANCE PLACEMENT RAD THERAPY FIELDS: ICD-10-PCS | Mod: 26,,, | Performed by: RADIOLOGY

## 2022-08-18 PROCEDURE — 77014 PR  CT GUIDANCE PLACEMENT RAD THERAPY FIELDS: CPT | Mod: 26,,, | Performed by: RADIOLOGY

## 2022-08-18 PROCEDURE — 77014 HC CT GUIDANCE RADIATION THERAPY FLDS PLACEMENT: CPT | Mod: TC | Performed by: RADIOLOGY

## 2022-08-19 PROCEDURE — 77014 PR  CT GUIDANCE PLACEMENT RAD THERAPY FIELDS: CPT | Mod: 26,,, | Performed by: RADIOLOGY

## 2022-08-19 PROCEDURE — 77014 PR  CT GUIDANCE PLACEMENT RAD THERAPY FIELDS: ICD-10-PCS | Mod: 26,,, | Performed by: RADIOLOGY

## 2022-08-19 PROCEDURE — 77014 HC CT GUIDANCE RADIATION THERAPY FLDS PLACEMENT: CPT | Mod: TC | Performed by: RADIOLOGY

## 2022-08-19 PROCEDURE — 77386 HC IMRT, COMPLEX: CPT | Performed by: RADIOLOGY

## 2022-08-20 LAB
ACID FAST MOD KINY STN SPEC: NORMAL
MYCOBACTERIUM SPEC QL CULT: NORMAL

## 2022-08-22 PROCEDURE — 77014 HC CT GUIDANCE RADIATION THERAPY FLDS PLACEMENT: CPT | Mod: TC | Performed by: RADIOLOGY

## 2022-08-22 PROCEDURE — 77386 HC IMRT, COMPLEX: CPT | Performed by: RADIOLOGY

## 2022-08-22 PROCEDURE — 77014 PR  CT GUIDANCE PLACEMENT RAD THERAPY FIELDS: ICD-10-PCS | Mod: 26,,, | Performed by: RADIOLOGY

## 2022-08-22 PROCEDURE — 77014 PR  CT GUIDANCE PLACEMENT RAD THERAPY FIELDS: CPT | Mod: 26,,, | Performed by: RADIOLOGY

## 2022-08-23 PROCEDURE — 77014 HC CT GUIDANCE RADIATION THERAPY FLDS PLACEMENT: CPT | Mod: TC | Performed by: RADIOLOGY

## 2022-08-23 PROCEDURE — 77386 HC IMRT, COMPLEX: CPT | Performed by: RADIOLOGY

## 2022-08-23 PROCEDURE — 77336 RADIATION PHYSICS CONSULT: CPT | Performed by: RADIOLOGY

## 2022-08-23 PROCEDURE — 77014 PR  CT GUIDANCE PLACEMENT RAD THERAPY FIELDS: ICD-10-PCS | Mod: 26,,, | Performed by: RADIOLOGY

## 2022-08-23 PROCEDURE — 77014 PR  CT GUIDANCE PLACEMENT RAD THERAPY FIELDS: CPT | Mod: 26,,, | Performed by: RADIOLOGY

## 2022-08-24 ENCOUNTER — DOCUMENTATION ONLY (OUTPATIENT)
Dept: RADIATION ONCOLOGY | Facility: CLINIC | Age: 87
End: 2022-08-24
Payer: MEDICAID

## 2022-08-24 ENCOUNTER — DOCUMENTATION ONLY (OUTPATIENT)
Dept: NUTRITION | Facility: CLINIC | Age: 87
End: 2022-08-24
Payer: MEDICAID

## 2022-08-24 ENCOUNTER — INFUSION (OUTPATIENT)
Dept: INFUSION THERAPY | Facility: HOSPITAL | Age: 87
End: 2022-08-24
Attending: INTERNAL MEDICINE
Payer: MEDICAID

## 2022-08-24 ENCOUNTER — OFFICE VISIT (OUTPATIENT)
Dept: HEMATOLOGY/ONCOLOGY | Facility: CLINIC | Age: 87
End: 2022-08-24
Payer: MEDICAID

## 2022-08-24 VITALS
HEART RATE: 95 BPM | WEIGHT: 102.75 LBS | DIASTOLIC BLOOD PRESSURE: 62 MMHG | HEIGHT: 64 IN | SYSTOLIC BLOOD PRESSURE: 105 MMHG | OXYGEN SATURATION: 98 % | BODY MASS INDEX: 17.54 KG/M2 | TEMPERATURE: 97 F

## 2022-08-24 VITALS
TEMPERATURE: 97 F | BODY MASS INDEX: 17.54 KG/M2 | RESPIRATION RATE: 18 BRPM | HEART RATE: 83 BPM | DIASTOLIC BLOOD PRESSURE: 52 MMHG | HEIGHT: 64 IN | OXYGEN SATURATION: 98 % | SYSTOLIC BLOOD PRESSURE: 107 MMHG | WEIGHT: 102.75 LBS

## 2022-08-24 DIAGNOSIS — T45.1X5A IMMUNODEFICIENCY DUE TO CHEMOTHERAPY: ICD-10-CM

## 2022-08-24 DIAGNOSIS — R63.0 LOSS OF APPETITE: ICD-10-CM

## 2022-08-24 DIAGNOSIS — E87.6 HYPOKALEMIA: Primary | ICD-10-CM

## 2022-08-24 DIAGNOSIS — C34.91 SQUAMOUS CELL CARCINOMA OF RIGHT LUNG: ICD-10-CM

## 2022-08-24 DIAGNOSIS — R60.0 HAND EDEMA: Primary | ICD-10-CM

## 2022-08-24 DIAGNOSIS — D50.0 IRON DEFICIENCY ANEMIA DUE TO CHRONIC BLOOD LOSS: ICD-10-CM

## 2022-08-24 DIAGNOSIS — Z79.899 IMMUNODEFICIENCY DUE TO CHEMOTHERAPY: ICD-10-CM

## 2022-08-24 DIAGNOSIS — E87.6 HYPOKALEMIA: ICD-10-CM

## 2022-08-24 DIAGNOSIS — D84.821 IMMUNODEFICIENCY DUE TO CHEMOTHERAPY: ICD-10-CM

## 2022-08-24 PROCEDURE — 99999 PR PBB SHADOW E&M-EST. PATIENT-LVL III: CPT | Mod: PBBFAC,,, | Performed by: INTERNAL MEDICINE

## 2022-08-24 PROCEDURE — 99213 OFFICE O/P EST LOW 20 MIN: CPT | Mod: PBBFAC | Performed by: INTERNAL MEDICINE

## 2022-08-24 PROCEDURE — 99215 OFFICE O/P EST HI 40 MIN: CPT | Mod: S$PBB,,, | Performed by: INTERNAL MEDICINE

## 2022-08-24 PROCEDURE — 77386 HC IMRT, COMPLEX: CPT | Performed by: RADIOLOGY

## 2022-08-24 PROCEDURE — 1126F PR PAIN SEVERITY QUANTIFIED, NO PAIN PRESENT: ICD-10-PCS | Mod: CPTII,,, | Performed by: INTERNAL MEDICINE

## 2022-08-24 PROCEDURE — 1159F PR MEDICATION LIST DOCUMENTED IN MEDICAL RECORD: ICD-10-PCS | Mod: CPTII,,, | Performed by: INTERNAL MEDICINE

## 2022-08-24 PROCEDURE — 96413 CHEMO IV INFUSION 1 HR: CPT

## 2022-08-24 PROCEDURE — 96375 TX/PRO/DX INJ NEW DRUG ADDON: CPT

## 2022-08-24 PROCEDURE — 1160F RVW MEDS BY RX/DR IN RCRD: CPT | Mod: CPTII,,, | Performed by: INTERNAL MEDICINE

## 2022-08-24 PROCEDURE — 25000003 PHARM REV CODE 250: Performed by: INTERNAL MEDICINE

## 2022-08-24 PROCEDURE — 63600175 PHARM REV CODE 636 W HCPCS: Performed by: INTERNAL MEDICINE

## 2022-08-24 PROCEDURE — 96367 TX/PROPH/DG ADDL SEQ IV INF: CPT

## 2022-08-24 PROCEDURE — 96417 CHEMO IV INFUS EACH ADDL SEQ: CPT

## 2022-08-24 PROCEDURE — 1160F PR REVIEW ALL MEDS BY PRESCRIBER/CLIN PHARMACIST DOCUMENTED: ICD-10-PCS | Mod: CPTII,,, | Performed by: INTERNAL MEDICINE

## 2022-08-24 PROCEDURE — 1159F MED LIST DOCD IN RCRD: CPT | Mod: CPTII,,, | Performed by: INTERNAL MEDICINE

## 2022-08-24 PROCEDURE — 77014 PR  CT GUIDANCE PLACEMENT RAD THERAPY FIELDS: CPT | Mod: 26,,, | Performed by: RADIOLOGY

## 2022-08-24 PROCEDURE — 99215 PR OFFICE/OUTPT VISIT, EST, LEVL V, 40-54 MIN: ICD-10-PCS | Mod: S$PBB,,, | Performed by: INTERNAL MEDICINE

## 2022-08-24 PROCEDURE — 1126F AMNT PAIN NOTED NONE PRSNT: CPT | Mod: CPTII,,, | Performed by: INTERNAL MEDICINE

## 2022-08-24 PROCEDURE — 99999 PR PBB SHADOW E&M-EST. PATIENT-LVL III: ICD-10-PCS | Mod: PBBFAC,,, | Performed by: INTERNAL MEDICINE

## 2022-08-24 PROCEDURE — 77014 PR  CT GUIDANCE PLACEMENT RAD THERAPY FIELDS: ICD-10-PCS | Mod: 26,,, | Performed by: RADIOLOGY

## 2022-08-24 PROCEDURE — 77014 HC CT GUIDANCE RADIATION THERAPY FLDS PLACEMENT: CPT | Mod: TC | Performed by: RADIOLOGY

## 2022-08-24 RX ORDER — HEPARIN 100 UNIT/ML
500 SYRINGE INTRAVENOUS
Status: CANCELLED | OUTPATIENT
Start: 2022-08-24

## 2022-08-24 RX ORDER — POTASSIUM CHLORIDE 20 MEQ/1
40 TABLET, EXTENDED RELEASE ORAL ONCE
Status: CANCELLED
Start: 2022-08-24

## 2022-08-24 RX ORDER — DIPHENHYDRAMINE HYDROCHLORIDE 50 MG/ML
50 INJECTION INTRAMUSCULAR; INTRAVENOUS ONCE AS NEEDED
Status: CANCELLED | OUTPATIENT
Start: 2022-08-24

## 2022-08-24 RX ORDER — EPINEPHRINE 0.3 MG/.3ML
0.3 INJECTION SUBCUTANEOUS ONCE AS NEEDED
Status: CANCELLED | OUTPATIENT
Start: 2022-08-24

## 2022-08-24 RX ORDER — SODIUM CHLORIDE 0.9 % (FLUSH) 0.9 %
10 SYRINGE (ML) INJECTION
Status: CANCELLED | OUTPATIENT
Start: 2022-08-24

## 2022-08-24 RX ORDER — FAMOTIDINE 10 MG/ML
20 INJECTION INTRAVENOUS
Status: COMPLETED | OUTPATIENT
Start: 2022-08-24 | End: 2022-08-24

## 2022-08-24 RX ORDER — SODIUM CHLORIDE 0.9 % (FLUSH) 0.9 %
10 SYRINGE (ML) INJECTION
Status: DISCONTINUED | OUTPATIENT
Start: 2022-08-24 | End: 2022-08-24 | Stop reason: HOSPADM

## 2022-08-24 RX ORDER — POTASSIUM CHLORIDE 20 MEQ/1
40 TABLET, EXTENDED RELEASE ORAL ONCE
Status: COMPLETED | OUTPATIENT
Start: 2022-08-24 | End: 2022-08-24

## 2022-08-24 RX ORDER — FAMOTIDINE 10 MG/ML
20 INJECTION INTRAVENOUS
Status: CANCELLED | OUTPATIENT
Start: 2022-08-24

## 2022-08-24 RX ADMIN — PACLITAXEL 66 MG: 6 INJECTION, SOLUTION INTRAVENOUS at 01:08

## 2022-08-24 RX ADMIN — POTASSIUM CHLORIDE 40 MEQ: 1500 TABLET, EXTENDED RELEASE ORAL at 12:08

## 2022-08-24 RX ADMIN — SODIUM CHLORIDE: 0.9 INJECTION, SOLUTION INTRAVENOUS at 12:08

## 2022-08-24 RX ADMIN — DIPHENHYDRAMINE HYDROCHLORIDE 50 MG: 50 INJECTION INTRAMUSCULAR; INTRAVENOUS at 12:08

## 2022-08-24 RX ADMIN — PALONOSETRON HYDROCHLORIDE 0.25 MG: 0.25 INJECTION, SOLUTION INTRAVENOUS at 12:08

## 2022-08-24 RX ADMIN — SODIUM CHLORIDE 155 MG: 9 INJECTION, SOLUTION INTRAVENOUS at 02:08

## 2022-08-24 RX ADMIN — FAMOTIDINE 20 MG: 10 INJECTION INTRAVENOUS at 12:08

## 2022-08-24 NOTE — PROGRESS NOTES
Subjective:      DATE OF VISIT: 8/24/22     ?  Patient ID:?Real Pagan is a 87 y.o. male.?? MR#: [id number]   ?   REFERRING PROVIDER: No referring provider defined for this encounter.     ? Primary Care Providers:  Shruthi Burr MD, MD (General)     CHIEF COMPLAINT: ?Referral for newly diagnosed lung cancer  ?   ONCOLOGIC DIAGNOSIS: Right lung squamous cell carcinoma [id number], stage IIIB  ?   CURRENT TREATMENT:  Plan for concurrent chemoradiation, see below    PAST TREATMENT: Biopsy  ?   ONCOLOGIC HISTORY:   5/6/22: Pt presented to PCP with complaints of unintentional weight loss, fatigue, non-productive cough, dyspnea on exertion, and shortness of breath. CXR demonstrated cavitary mass-like density seen projecting over the right hilar region.    5/24/22: PET CT demonstrated 6.2cm cavitary mass in right lung extending to right hilum and abutting pleura, avid max SUV 35, mildly enlarged right paratracheal node low level uptake max SUV 2.7, no distant metastases noted.    7/1/22: bronchoscopy - right lower lobe transbronchial and bronchial biopsies positive for squamous cell carcinoma  ?   Oncology History   Squamous cell carcinoma of right lung   7/11/2022 Initial Diagnosis    Squamous cell carcinoma of right lung     7/18/2022 Cancer Staged    Staging form: Lung, AJCC 8th Edition  - Clinical stage from 7/18/2022: Stage IIIB (cT3, cN2, cM0)     8/10/2022 -  Chemotherapy    Treatment Summary   Plan Name: OP NSCLC PACLITAXEL + CARBOPLATIN (AUC) QW + RADIATION  Treatment Goal: Curative  Status: Active  Start Date: 8/10/2022  End Date: 9/14/2022 (Planned)  Provider: Lindsay Paiz MD  Chemotherapy: CARBOplatin (PARAPLATIN) 155 mg in sodium chloride 0.9% 265.5 mL chemo infusion, 155 mg (100 % of original dose 155.6 mg), Intravenous, Clinic/HOD 1 time, 2 of 6 cycles  Dose modification:   (original dose 155.6 mg, Cycle 1),   (original dose 155.6 mg, Cycle 2),   (original dose 155.6 mg, Cycle  3)  Administration: 155 mg (8/10/2022), 155 mg (8/17/2022)  PACLitaxeL (TAXOL) 45 mg/m2 = 66 mg in sodium chloride 0.9% 100 mL chemo infusion, 45 mg/m2 = 66 mg, Intravenous, Clinic/HOD 1 time, 2 of 6 cycles  Administration: 66 mg (8/10/2022), 66 mg (8/17/2022)       Cancer Staging  Right lung squamous cell carcinoma [id number], stage IIIB    HPI:  We are assisted by Maltese-speaking .  He is done well with his 1st 2 weeks of chemo radiation.  One episode of dizziness nausea and vomiting but improved on antiemetics prescribed p.r.n..  He completed 2 doses of IV iron therapy 8/8 and 8/15.    Review of systems  A comprehensive 14-point review of systems was reviewed with patient and was negative other than as specified above.   ?   Objective:      Vitals:    08/24/22 1026   BP: 105/62   Pulse: 95   Temp: 97.4 °F (36.3 °C)      ?   ECOG:?2  General appearance: Ill-appearing, in no acute distress.   Head, eyes, ears, nose, and throat: Pupils round and equally reactive to light. Oropharynx clear with moist mucous membranes.   Cardiovascular: Regular rate and rhythm, S1, S2, no audible murmurs.   Respiratory: Dullness and decreased breath sounds in right middle lobe.   Abdomen: Bowel sounds present, soft, nontender, nondistended.   Extremities: Warm, without edema.   Neurologic: Alert and oriented. Grossly normal strength, coordination, and gait.   Skin: No rashes, ecchymoses or petechial lesion.   ?  ?   Laboratory:  ?   Lab Visit on 08/24/2022   Component Date Value Ref Range Status    WBC 08/24/2022 3.36 (A) 3.90 - 12.70 K/uL Final    RBC 08/24/2022 3.76 (A) 4.60 - 6.20 M/uL Final    Hemoglobin 08/24/2022 10.1 (A) 14.0 - 18.0 g/dL Final    Hematocrit 08/24/2022 32.9 (A) 40.0 - 54.0 % Final    MCV 08/24/2022 88  82 - 98 fL Final    MCH 08/24/2022 26.9 (A) 27.0 - 31.0 pg Final    MCHC 08/24/2022 30.7 (A) 32.0 - 36.0 g/dL Final    RDW 08/24/2022 18.0 (A) 11.5 - 14.5 % Final    Platelets 08/24/2022 104 (A)  150 - 450 K/uL Final    MPV 08/24/2022 9.0 (A) 9.2 - 12.9 fL Final    Immature Granulocytes 08/24/2022 0.6 (A) 0.0 - 0.5 % Final    Gran # (ANC) 08/24/2022 2.6  1.8 - 7.7 K/uL Final    Immature Grans (Abs) 08/24/2022 0.02  0.00 - 0.04 K/uL Final    Lymph # 08/24/2022 0.5 (A) 1.0 - 4.8 K/uL Final    Mono # 08/24/2022 0.3  0.3 - 1.0 K/uL Final    Eos # 08/24/2022 0.0  0.0 - 0.5 K/uL Final    Baso # 08/24/2022 0.01  0.00 - 0.20 K/uL Final    nRBC 08/24/2022 0  0 /100 WBC Final    Gran % 08/24/2022 75.9 (A) 38.0 - 73.0 % Final    Lymph % 08/24/2022 14.6 (A) 18.0 - 48.0 % Final    Mono % 08/24/2022 8.3  4.0 - 15.0 % Final    Eosinophil % 08/24/2022 0.3  0.0 - 8.0 % Final    Basophil % 08/24/2022 0.3  0.0 - 1.9 % Final    Differential Method 08/24/2022 Automated   Final    Sodium 08/24/2022 140  136 - 145 mmol/L Final    Potassium 08/24/2022 3.2 (A) 3.5 - 5.1 mmol/L Final    Chloride 08/24/2022 100  95 - 110 mmol/L Final    CO2 08/24/2022 29  23 - 29 mmol/L Final    Glucose 08/24/2022 131 (A) 70 - 110 mg/dL Final    BUN 08/24/2022 11  8 - 23 mg/dL Final    Creatinine 08/24/2022 0.7  0.5 - 1.4 mg/dL Final    Calcium 08/24/2022 8.7  8.7 - 10.5 mg/dL Final    Total Protein 08/24/2022 6.3  6.0 - 8.4 g/dL Final    Albumin 08/24/2022 2.3 (A) 3.5 - 5.2 g/dL Final    Total Bilirubin 08/24/2022 0.4  0.1 - 1.0 mg/dL Final    Alkaline Phosphatase 08/24/2022 127  55 - 135 U/L Final    AST 08/24/2022 16  10 - 40 U/L Final    ALT 08/24/2022 12  10 - 44 U/L Final    Anion Gap 08/24/2022 11  8 - 16 mmol/L Final    eGFR 08/24/2022 >60  >60 mL/min/1.73 m^2 Final       Imaging:    No results found for this or any previous visit (from the past 2160 hour(s)).  Results for orders placed or performed during the hospital encounter of 07/28/22 (from the past 2160 hour(s))   MRI Brain W WO Contrast    Impression    1. No evidence of metastatic disease to the brain.  2. Mild to moderate patchy T2 FLAIR hyperintensity  within the cerebral white matter likely relates to chronic microvascular ischemia.  3. Mild generalized atrophy.  4. Changes of chronic left maxillary sinusitis.      Electronically signed by: Gerard Hannah Jr., MD  Date:    07/28/2022  Time:    23:39     No results found for this or any previous visit (from the past 2160 hour(s)).  PET CT 5/25/22:  Head/neck: Normal physiologic activity present.    Chest: 6.2 x 5.2 cm cavitary mass lesion within the right lung which extends to the right hilum and abuts the posteromedial pleura.  Lesion is FDG avid within SUV max of 35.  Adjacent atelectasis/airspace opacity present.  No other FDG avid pulmonary nodule appreciated.  Correlate with diagnostic CT chest imaging.     No hilar FDG avid lymph nodes.  No axillary FDG avid lymph nodes.  Mildly enlarged right paratracheal lymph no demonstrating low level activity maintaining fatty hilum. Subcentimeter short axis more cranial right paratracheal lymph node present demonstrating a SUV max 2.7.  Subcentimeter subcarinal lymph node with low level activity.     Bilateral gynecomastia noted.     Abdomen/pelvis: Physiologic activity without concerning FDG avid focus.     Renal cysts present.  Prostate enlargement.  Left inguinal hernia containing a portion of the sigmoid colon.  No evidence of obstruction or strangulation.     Skeletal/subcutaneous structures: No FDG avid osseous lesion.     Impression:     Large cavitary FDG avid right lung mass.  Malignancy versus infectious etiology..  Mediastinal lymph nodes as above.  Other findings as above.    Pathology:    1. AND 2. RIGHT LOWER LOBE TRANSBRONCHIAL AND BRONCHIAL BIOPSIES:   POSITIVE FOR SQUAMOUS CARCINOMA    ?   Assessment/Plan:   Hand edema  -     US Upper Extremity Veins Left; Future; Expected date: 08/24/2022    Squamous cell carcinoma of right lung    Immunodeficiency due to chemotherapy    Loss of appetite    Iron deficiency anemia due to chronic blood loss    Other  orders  -     palonosetron (ALOXI) 0.25 mg with Dexamethasone (DECADRON) 12 mg in NS 50 mL IVPB  -     diphenhydrAMINE (BENADRYL) 50 mg in sodium chloride 0.9% 50 mL IVPB  -     famotidine (PF) injection 20 mg  -     PACLitaxeL (TAXOL) 45 mg/m2 = 66 mg in sodium chloride 0.9% 100 mL chemo infusion  -     CARBOplatin (PARAPLATIN) 155 mg in sodium chloride 0.9% 250 mL chemo infusion  -     EPINEPHrine (EPIPEN) 0.3 mg/0.3 mL pen injection 0.3 mg  -     diphenhydrAMINE injection 50 mg  -     hydrocortisone sodium succinate injection 100 mg  -     sodium chloride 0.9% 250 mL flush bag  -     sodium chloride 0.9% flush 10 mL  -     heparin, porcine (PF) 100 unit/mL injection flush 500 Units  -     alteplase injection 2 mg       1. Hand edema    2. Squamous cell carcinoma of right lung    3. Immunodeficiency due to chemotherapy    4. Loss of appetite    5. Iron deficiency anemia due to chronic blood loss          Plan:     Problem List Items Addressed This Visit        Oncology    Squamous cell carcinoma of right lung    Iron deficiency anemia due to chronic blood loss       Other    Loss of appetite      Other Visit Diagnoses     Hand edema    -  Primary    Immunodeficiency due to chemotherapy            # Squamous Cell Carcinoma of Right Lung, Stage IIIB; [id number]   biopsy-proven squamous cell carcinoma transbronchial biopsy; PET with mild avidity of right paratracheal node cannot exclude disease involvement.  We did discuss consideration of bronchoscopy/EBUS for pathologic evaluation of lymph node involvement however given high risk lesion central, larger size and avidity, in discussion with Radiation Oncology colleague Dr. Vanegas preference to avoid additional procedure and delay in initiation of therapy in favor of proceeding with definitive chemoradiation including within radiation field with attempt to limit toxicity.  Tenuous functional status and discussed with patient and family present with assistance of   diagnosis, treatment plan and risks and benefits of therapy with chemoradiation plan and close monitoring for toxicity however concurrent chemotherapy with carboplatin paclitaxel is preferred given locally advanced malignancy.    Presents for cycle 3 tolerating well mild nausea improved with antiemetics.  Labs reviewed with pancytopenia stable anemia mild decline in WBC and platelets.  Okay to proceed with treatment today but attention to follow-up on repeat labs in 1 week.      Iron deficiency anemia:  Status post IV iron therapy completed 2 doses Feraheme on 08/15/2022.  Stable anemia but will continue to trend with ongoing therapy.        Anorexia:  Recommend nutrition consultation.  Dronabinol not approved.  Megace prescribed by colleague, reviewed potential thrombotic association.  New swelling left hand progressive will get Doppler to evaluate.    Follow-Up:   Left upper extremity Doppler   Cycle 3 today  Revisit 1 week CBC, CMP, cycle 4 planned, and P okay

## 2022-08-24 NOTE — PROGRESS NOTES
MRN: [id number]    Referring Physician: Pt sees Lindsay Paiz MD and Alvarado Vanegas III, MD; RN notified RDs of need for nutrition    Reason for Nutrition Consult: Cachexia, Decreased Appetite, New Patient - Nutrition Counseling and Education , Severe Protein Calorie Malnutrition and Weight Loss    Previous Medical History:  Past Medical History:   Diagnosis Date    Dyslipidemia     Iron deficiency anemia secondary to inadequate dietary iron intake     Pre-diabetes        Previous Surgical History:  Past Surgical History:   Procedure Laterality Date    BRONCHOSCOPY N/A 7/1/2022    Procedure: Bronchoscopy;  Surgeon: Jey Scott MD;  Location: [facility];  Service: Endoscopy;  Laterality: N/A;    CATARACT EXTRACTION, BILATERAL            Nutrition Assessment    Real Pagan is a 87 y.o. male referred for a Nutrition Consultation related to Cachexia, Decreased Appetite, New Patient - Nutrition Counseling and Education , Severe Protein Calorie Malnutrition and Weight Loss.  Patient has a diagnosis of right lung squamous cell carcinoma, stage IIIB.  Currently on chemotherapy with Carboplatin and Paclitaxel as well as concurrent radiation therapy. Pt has suffered severe weight loss of nearly 20 pounds in 3 months and 35 pounds in a year. Pt and family requested pt's cousin Bhavya molina over the phone for RD visit. Pt family reports pt will consume smoothies, milk, oatmeal, Boost nutrition supplement, beans, and lamb. However, he is fatigued and sleeps a lot throughout the day, and his appetite is low except for occasionally upon waking from his afternoon nap. Pt family reports pt had nausea after radiation but that the medication prescribed for the nausea helped. RD educated family on how to increase pt's calorie intake. The calorie dense foods pt family reports are palatable to pt are: lamb, butter, whole milk, coconut milk, avocado, honey, dry milk powder, strawberry ice cream, and grilled  "cheese. Family were grateful for the ideas on how to best increase calories and will try to make pt's foods as calorie dense as possible and offer him these items as frequently as possible. However, caretaker does report pt gets upset with being offered food regularly so it is a struggle for family.    Anthropometrics  Weight: 46.6 kg (102 lb 12 oz)  Height:  1.626 m (5'4")   IBW: Ideal body weight: 60.8 kg (134 lb 1.8 oz)  UBW:  62.8 kg (138 lb)    Estimated body mass index is 17.63 kg/m² as calculated from the following:    Height as of an earlier encounter on 8/24/22: 5' 4" (1.626 m).    Weight as of an earlier encounter on 8/24/22: 46.6 kg (102 lb 11.8 oz).    BMI: 17.0-18.4 kg/m2 reflects Mild Thinness  World Health Organization's (WHO) recommended body weight based on BMI values for adults. It is used for both men and women, age 18 or older.    Weight History:  Wt Readings from Last 12 Encounters:   08/24/22 46.6 kg (102 lb 11.8 oz)   08/24/22 46.6 kg (102 lb 11.8 oz)   08/17/22 46.8 kg (103 lb 2.8 oz)   08/17/22 46.8 kg (103 lb 2.8 oz)   08/10/22 48.2 kg (106 lb 4.2 oz)   08/08/22 48.5 kg (106 lb 14.8 oz)   07/18/22 50.2 kg (110 lb 10.7 oz)   07/18/22 50.2 kg (110 lb 11.2 oz)   07/18/22 50.2 kg (110 lb 10.7 oz)   07/11/22 50.6 kg (111 lb 8.8 oz)   07/01/22 51.3 kg (113 lb)   06/21/22 51.6 kg (113 lb 12.1 oz)   ]    Weight Change when compared to Current Weight:   Wt Readings from Last 1 Encounters:   08/24/22 46.6 kg (102 lb 11.8 oz)   ]     Weight Pounds %Change Significant Severe   1 Week - - - 1-2% >2%   1 Month - - - 5% >5%   3 Months 55.2 kg 121.5 -15.6 % 7.5% >7.5%   6 Months - - - 10% >10%   1 Year 62.8 kg 138 -25.8% 20% >20%     Based on weight history, patient's weight has decreased. Patient has lost 15-20 pounds in the past 3 months which reflects a severe loss for time frame at 15.6%. Pt also lost 35 pounds in the past year, which reflects a severe loss for time frame at 25.8%.    Malnutrition " Assessment:  Severe Protein-Calorie Malnutrition  Malnutrition in the context of Acute Illness/Injury  Energy Intake: <75% of estimated energy requirement for 87 year old man with lung cancer.  Weight Loss: 15.6% x 3 months and 25.8% x 12 months     Nutrition  Prescription:   Energy Needs:  1398 - 1631 kcal/day (30 - 35 kcal/kg)  Protein Needs: 56 gm/day (1.2 gm Protein/kg)  Fluid Needs: 1398 - 1631 ml/day  (1 mL/calorie)    Gastrointestinal:   Oncology Nutrition Symptoms: anorexia, weight loss, nausea, fatigue and feel full quickly  GI Symptoms: early satiety - feeling of fullness after eating a very small amount, nausea or weight loss 19 lbs. within the last 3 month(s)   Frequency: a bowel movement every 1 day    Nutrition History  Appetite: poor    Meal Patterns: 2-3 meals daily and using meal replacement shakes  Breakfast: 1/2 cup milk, 1/2 cup oatmeal  Morning Snack: none  Lunch: beans, smoothie  Afternoon Snack: Boost supplement  Dinner: lamb, rice  Night time Snack: none  Beverage Intake: drinks oral nutrition supplement 1 bottles per day. , drinks smoothies, drinks some regular beverages, water and milk     Food Intolerance: NKFA    Meal preparation/shopping: family member   Physical Abilities: Receives assitance with intake   Dining out: Infrequent, 1-2 times per week    Dentition: normal dentition for age                  Difficulty chewing or swallowing? No    Exercise Level: not active  Activities:  activities of daily living only       Medication:  Medication reviewed for interactions. Yes NONE    Current Outpatient Medications:     albuterol (PROVENTIL) 2.5 mg /3 mL (0.083 %) nebulizer solution, Take 3 mLs (2.5 mg total) by nebulization every 4 to 6 hours as needed for Wheezing or Shortness of Breath., Disp: 360 mL, Rfl: 11    albuterol (PROVENTIL/VENTOLIN HFA) 90 mcg/actuation inhaler, Inhale 2 puffs into the lungs every 6 (six) hours as needed., Disp: 18 g, Rfl: 0    ferrous sulfate (FEOSOL) 325  mg (65 mg iron) Tab tablet, Take 325 mg by mouth daily with breakfast., Disp: , Rfl:     megestroL (MEGACE) 40 MG Tab, Take 1 tablet (40 mg total) by mouth 4 (four) times daily., Disp: 120 tablet, Rfl: 0    ondansetron (ZOFRAN-ODT) 8 MG TbDL, Take 1 tablet (8 mg total) by mouth every 12 (twelve) hours as needed., Disp: 30 tablet, Rfl: 1    predniSONE (DELTASONE) 20 MG tablet, Prednisone 60 mg/ day for 3 days, 40 mg/day for 3 days,20 mg/ day for 3 days, (1/2 tablet )10 mg a day for 3 days., Disp: 20 tablet, Rfl: 0    prochlorperazine (COMPAZINE) 5 MG tablet, Take 1 tablet (5 mg total) by mouth every 6 (six) hours as needed for Nausea., Disp: 30 tablet, Rfl: 1    promethazine-codeine 6.25-10 mg/5 ml (PHENERGAN WITH CODEINE) 6.25-10 mg/5 mL syrup, Take 5 mLs by mouth every 6 (six) hours as needed for Cough., Disp: 240 mL, Rfl: 0    rosuvastatin (CRESTOR) 10 MG tablet, TAKE 1 TABLET(10 MG) BY MOUTH EVERY DAY, Disp: 90 tablet, Rfl: 0  No current facility-administered medications for this visit.    Facility-Administered Medications Ordered in Other Visits:     CARBOplatin (PARAPLATIN) 155 mg in sodium chloride 0.9% 265.5 mL chemo infusion, 155 mg, Intravenous, 1 time in Clinic/HOD, Lindsay Paiz MD    sodium chloride 0.9% flush 10 mL, 10 mL, Intravenous, PRN, Lindsay Paiz MD    Vitamin/Supplements/Herbs: none    Allergies:  Review of patient's allergies indicates:  No Known Allergies    Labs:   Results for orders placed or performed in visit on 08/24/22   CBC Auto Differential   Result Value Ref Range    WBC 3.36 (L) 3.90 - 12.70 K/uL    RBC 3.76 (L) 4.60 - 6.20 M/uL    Hemoglobin 10.1 (L) 14.0 - 18.0 g/dL    Hematocrit 32.9 (L) 40.0 - 54.0 %    MCV 88 82 - 98 fL    MCH 26.9 (L) 27.0 - 31.0 pg    MCHC 30.7 (L) 32.0 - 36.0 g/dL    RDW 18.0 (H) 11.5 - 14.5 %    Platelets 104 (L) 150 - 450 K/uL    MPV 9.0 (L) 9.2 - 12.9 fL    Immature Granulocytes 0.6 (H) 0.0 - 0.5 %    Gran # (ANC) 2.6 1.8 - 7.7 K/uL     Immature Grans (Abs) 0.02 0.00 - 0.04 K/uL    Lymph # 0.5 (L) 1.0 - 4.8 K/uL    Mono # 0.3 0.3 - 1.0 K/uL    Eos # 0.0 0.0 - 0.5 K/uL    Baso # 0.01 0.00 - 0.20 K/uL    nRBC 0 0 /100 WBC    Gran % 75.9 (H) 38.0 - 73.0 %    Lymph % 14.6 (L) 18.0 - 48.0 %    Mono % 8.3 4.0 - 15.0 %    Eosinophil % 0.3 0.0 - 8.0 %    Basophil % 0.3 0.0 - 1.9 %    Differential Method Automated    Comprehensive Metabolic Panel   Result Value Ref Range    Sodium 140 136 - 145 mmol/L    Potassium 3.2 (L) 3.5 - 5.1 mmol/L    Chloride 100 95 - 110 mmol/L    CO2 29 23 - 29 mmol/L    Glucose 131 (H) 70 - 110 mg/dL    BUN 11 8 - 23 mg/dL    Creatinine 0.7 0.5 - 1.4 mg/dL    Calcium 8.7 8.7 - 10.5 mg/dL    Total Protein 6.3 6.0 - 8.4 g/dL    Albumin 2.3 (L) 3.5 - 5.2 g/dL    Total Bilirubin 0.4 0.1 - 1.0 mg/dL    Alkaline Phosphatase 127 55 - 135 U/L    AST 16 10 - 40 U/L    ALT 12 10 - 44 U/L    Anion Gap 11 8 - 16 mmol/L    eGFR >60 >60 mL/min/1.73 m^2        Nutrition Diagnosis    Nutrition Problem  Inadequate energy intake      Related to (etiology):   Decreased oral intake  Early Satiety  Inability to take in adequate calories and protein requirements to maintain weight   Physiological causes resulting in anorexia or diminished intake      Signs and Symptoms (as evidenced by):   BMI of 17.0-18.4 kg/m2 reflects Mild Thinness, consuming less than 75% of estimated energy needs for one month and unintentional weight loss of 15.6% in 3 month. and Inability to consume sufficient energy due to poor appetite         Nutrition Intervention    Energy-modified diet - Consume 3049-9020 calories every day.   Protein-modified diet - Consume 55-60 gm of Protein every day.   Schedule of food/fluids - Eat 5-6 small meals per day, instead of 3 big meals. Have drinks between meals, not with meals or snacks.  Medical food supplement: Commercial beverage Boost Plus with meals   Nutrition Education  Recommended modifications  Strategies  Social  support    Recommendations:  1. Eat a very calorie dense meal or snack any time you are hungry: include avocado, lamb, coconut milk, cheese, whole milk, butter, honey, and ice cream in each meal and snack.   2. Continue drinking Boost Plus beverage, 1 per day. Drink half at one time and half later if the full carton is too much at one time.   3. Try to eat 5-6 small meals daily.      Written Information Provided and Explained:  1.  High-Calorie, High-Protein Nutrition Therapy  2. Suggestions For Increasing Calories And Protein   3. Eating for those with Low Appetite      Nutrition Monitoring and Evaluation    Monitor: energy intake, weight, symptom management  and nutrition related impact symptoms nausea      Goals:   1. Behavior: Patient/client/caregiver fatigue during feeding process resulting in inadequate intake and Other (anorexia)  2. Adequate intake of calories and protein to promote weight maintenance and/or gain   3. Adherence to nutrition recommendations for improved symptom management      Nutrition Education:     Discussed with: caregiver and cousin Bhavya, who translated over the phone    Anticipated Barriers: caregivers report pt fatigue, little interest in eating and low appetite    Actions Taken: Discussion  and instruct/provide written information    Patient and/or family comprehend instructions: yes    Outcome: Verbalizes understanding    Comprehension: good     Motivation to change: moderate      Nutrition Communication, Consultation Time, and Follow Up:      Communication:   1. Note available in Epic for review.  2. Note routed to Lindsay Paiz MD and Alvarado Vanegas III, MD      Consultation Time: 15 minutes.      Follow Up: In 1-2 weeks

## 2022-08-24 NOTE — PLAN OF CARE
Day 12 of outpatient xrt to the lung. had dizziness yetserday; no pain; mild nausea and fatigue; mild SOB. Will continue to monitor.

## 2022-08-24 NOTE — PLAN OF CARE
Problem: Adult Inpatient Plan of Care  Goal: Plan of Care Review  Outcome: Ongoing, Progressing  Flowsheets (Taken 8/24/2022 1227)  Plan of Care Reviewed With: patient  Goal: Patient-Specific Goal (Individualized)  Outcome: Ongoing, Progressing  Flowsheets (Taken 8/24/2022 1227)  Anxieties, Fears or Concerns: denies  Individualized Care Needs: feet elevated, warm blanket pillow and snack provided  Patient-Specific Goals (Include Timeframe): tolerate treatment today  Goal: Optimal Comfort and Wellbeing  Outcome: Ongoing, Progressing  Intervention: Provide Person-Centered Care  Flowsheets (Taken 8/24/2022 1227)  Trust Relationship/Rapport:   care explained   choices provided   emotional support provided   empathic listening provided   questions answered   questions encouraged   reassurance provided   thoughts/feelings acknowledged     Problem: Fall Injury Risk  Goal: Absence of Fall and Fall-Related Injury  Outcome: Ongoing, Progressing  Intervention: Identify and Manage Contributors  Flowsheets (Taken 8/24/2022 1227)  Self-Care Promotion:   independence encouraged   BADL personal objects within reach   BADL personal routines maintained   safe use of adaptive equipment encouraged  Medication Review/Management: medications reviewed  Intervention: Promote Injury-Free Environment  Flowsheets (Taken 8/24/2022 1227)  Safety Promotion/Fall Prevention:   assistive device/personal item within reach   Fall Risk reviewed with patient/family   in recliner, wheels locked   medications reviewed   instructed to call staff for mobility     Problem: Fatigue  Goal: Improved Activity Tolerance  Outcome: Ongoing, Progressing  Intervention: Promote Improved Energy  Flowsheets (Taken 8/24/2022 1227)  Fatigue Management:   activity schedule adjusted   paced activity encouraged   frequent rest breaks encouraged  Activity Management: Ambulated -L4

## 2022-08-25 ENCOUNTER — PATIENT MESSAGE (OUTPATIENT)
Dept: HEMATOLOGY/ONCOLOGY | Facility: CLINIC | Age: 87
End: 2022-08-25
Payer: MEDICAID

## 2022-08-25 ENCOUNTER — TELEPHONE (OUTPATIENT)
Dept: HEMATOLOGY/ONCOLOGY | Facility: CLINIC | Age: 87
End: 2022-08-25
Payer: MEDICAID

## 2022-08-25 ENCOUNTER — HOSPITAL ENCOUNTER (OUTPATIENT)
Dept: RADIOLOGY | Facility: HOSPITAL | Age: 87
Discharge: HOME | End: 2022-08-25
Attending: INTERNAL MEDICINE
Payer: MEDICAID

## 2022-08-25 DIAGNOSIS — R60.0 HAND EDEMA: ICD-10-CM

## 2022-08-25 DIAGNOSIS — I82.619: Primary | ICD-10-CM

## 2022-08-25 PROCEDURE — 77014 HC CT GUIDANCE RADIATION THERAPY FLDS PLACEMENT: CPT | Mod: TC | Performed by: RADIOLOGY

## 2022-08-25 PROCEDURE — 77014 PR  CT GUIDANCE PLACEMENT RAD THERAPY FIELDS: ICD-10-PCS | Mod: 26,,, | Performed by: RADIOLOGY

## 2022-08-25 PROCEDURE — 77386 HC IMRT, COMPLEX: CPT | Performed by: RADIOLOGY

## 2022-08-25 PROCEDURE — 77014 PR  CT GUIDANCE PLACEMENT RAD THERAPY FIELDS: CPT | Mod: 26,,, | Performed by: RADIOLOGY

## 2022-08-25 PROCEDURE — 93971 EXTREMITY STUDY: CPT | Mod: TC,LT

## 2022-08-25 NOTE — PROGRESS NOTES
Acute severe edema left hand led to left upper extremity Doppler showing extensive cephalic vein complete occlusion.  No DVT.  Given extensive nature of superficial thrombosis and high risk patient for progression given hypercoagulability with ongoing malignancy lung cancer and tobacco use recommend anticoagulation.  Sent rivaroxaban 10 mg daily.  Instructed him to discontinue megestrol.  Will need close monitoring for any bleeding and for improvement.

## 2022-08-26 PROCEDURE — 77014 PR  CT GUIDANCE PLACEMENT RAD THERAPY FIELDS: CPT | Mod: 26,,, | Performed by: RADIOLOGY

## 2022-08-26 PROCEDURE — 77014 PR  CT GUIDANCE PLACEMENT RAD THERAPY FIELDS: ICD-10-PCS | Mod: 26,,, | Performed by: RADIOLOGY

## 2022-08-26 PROCEDURE — 77014 HC CT GUIDANCE RADIATION THERAPY FLDS PLACEMENT: CPT | Mod: TC | Performed by: RADIOLOGY

## 2022-08-26 PROCEDURE — 77386 HC IMRT, COMPLEX: CPT | Performed by: RADIOLOGY

## 2022-08-29 PROCEDURE — 77014 PR  CT GUIDANCE PLACEMENT RAD THERAPY FIELDS: CPT | Mod: 26,,, | Performed by: RADIOLOGY

## 2022-08-29 PROCEDURE — 77386 HC IMRT, COMPLEX: CPT | Performed by: RADIOLOGY

## 2022-08-29 PROCEDURE — 77014 HC CT GUIDANCE RADIATION THERAPY FLDS PLACEMENT: CPT | Mod: TC | Performed by: RADIOLOGY

## 2022-08-29 PROCEDURE — 77014 PR  CT GUIDANCE PLACEMENT RAD THERAPY FIELDS: ICD-10-PCS | Mod: 26,,, | Performed by: RADIOLOGY

## 2022-08-30 PROCEDURE — 77014 HC CT GUIDANCE RADIATION THERAPY FLDS PLACEMENT: CPT | Mod: TC | Performed by: RADIOLOGY

## 2022-08-30 PROCEDURE — 77014 PR  CT GUIDANCE PLACEMENT RAD THERAPY FIELDS: CPT | Mod: 26,,, | Performed by: RADIOLOGY

## 2022-08-30 PROCEDURE — 77014 PR  CT GUIDANCE PLACEMENT RAD THERAPY FIELDS: ICD-10-PCS | Mod: 26,,, | Performed by: RADIOLOGY

## 2022-08-30 PROCEDURE — 77336 RADIATION PHYSICS CONSULT: CPT | Performed by: RADIOLOGY

## 2022-08-30 PROCEDURE — 77386 HC IMRT, COMPLEX: CPT | Performed by: RADIOLOGY

## 2022-08-31 ENCOUNTER — TELEPHONE (OUTPATIENT)
Dept: HEMATOLOGY/ONCOLOGY | Facility: CLINIC | Age: 87
End: 2022-08-31

## 2022-08-31 ENCOUNTER — LAB VISIT (OUTPATIENT)
Dept: LAB | Facility: HOSPITAL | Age: 87
End: 2022-08-31
Attending: INTERNAL MEDICINE
Payer: MEDICAID

## 2022-08-31 ENCOUNTER — OFFICE VISIT (OUTPATIENT)
Dept: HEMATOLOGY/ONCOLOGY | Facility: CLINIC | Age: 87
End: 2022-08-31
Payer: MEDICAID

## 2022-08-31 ENCOUNTER — DOCUMENTATION ONLY (OUTPATIENT)
Dept: RADIATION ONCOLOGY | Facility: CLINIC | Age: 87
End: 2022-08-31
Payer: MEDICAID

## 2022-08-31 ENCOUNTER — TELEPHONE (OUTPATIENT)
Dept: NUTRITION | Facility: CLINIC | Age: 87
End: 2022-08-31
Payer: MEDICAID

## 2022-08-31 VITALS
HEART RATE: 82 BPM | BODY MASS INDEX: 16.41 KG/M2 | DIASTOLIC BLOOD PRESSURE: 61 MMHG | TEMPERATURE: 98 F | HEIGHT: 64 IN | OXYGEN SATURATION: 98 % | SYSTOLIC BLOOD PRESSURE: 118 MMHG | WEIGHT: 96.13 LBS

## 2022-08-31 DIAGNOSIS — R63.0 LOSS OF APPETITE: ICD-10-CM

## 2022-08-31 DIAGNOSIS — C34.91 SQUAMOUS CELL CARCINOMA OF RIGHT LUNG: Primary | ICD-10-CM

## 2022-08-31 DIAGNOSIS — C34.91 BRONCHOGENIC CANCER OF RIGHT LUNG: ICD-10-CM

## 2022-08-31 DIAGNOSIS — T45.1X5A IMMUNODEFICIENCY DUE TO CHEMOTHERAPY: ICD-10-CM

## 2022-08-31 DIAGNOSIS — Z79.899 IMMUNODEFICIENCY DUE TO CHEMOTHERAPY: ICD-10-CM

## 2022-08-31 DIAGNOSIS — I82.619: ICD-10-CM

## 2022-08-31 DIAGNOSIS — D69.6 THROMBOCYTOPENIA: ICD-10-CM

## 2022-08-31 DIAGNOSIS — D84.821 IMMUNODEFICIENCY DUE TO CHEMOTHERAPY: ICD-10-CM

## 2022-08-31 DIAGNOSIS — D63.0 ANEMIA IN NEOPLASTIC DISEASE: ICD-10-CM

## 2022-08-31 LAB
ALBUMIN SERPL BCP-MCNC: 2.4 G/DL (ref 3.5–5.2)
ALP SERPL-CCNC: 127 U/L (ref 55–135)
ALT SERPL W/O P-5'-P-CCNC: 9 U/L (ref 10–44)
ANION GAP SERPL CALC-SCNC: 12 MMOL/L (ref 8–16)
AST SERPL-CCNC: 13 U/L (ref 10–40)
BASOPHILS # BLD AUTO: 0 K/UL (ref 0–0.2)
BASOPHILS NFR BLD: 0 % (ref 0–1.9)
BILIRUB SERPL-MCNC: 0.5 MG/DL (ref 0.1–1)
BUN SERPL-MCNC: 10 MG/DL (ref 8–23)
CALCIUM SERPL-MCNC: 8.4 MG/DL (ref 8.7–10.5)
CHLORIDE SERPL-SCNC: 100 MMOL/L (ref 95–110)
CO2 SERPL-SCNC: 26 MMOL/L (ref 23–29)
CREAT SERPL-MCNC: 0.7 MG/DL (ref 0.5–1.4)
DIFFERENTIAL METHOD: ABNORMAL
EOSINOPHIL # BLD AUTO: 0 K/UL (ref 0–0.5)
EOSINOPHIL NFR BLD: 0.5 % (ref 0–8)
ERYTHROCYTE [DISTWIDTH] IN BLOOD BY AUTOMATED COUNT: 18.7 % (ref 11.5–14.5)
EST. GFR  (NO RACE VARIABLE): >60 ML/MIN/1.73 M^2
GLUCOSE SERPL-MCNC: 151 MG/DL (ref 70–110)
HCT VFR BLD AUTO: 31.6 % (ref 40–54)
HGB BLD-MCNC: 9.8 G/DL (ref 14–18)
IMM GRANULOCYTES # BLD AUTO: 0.02 K/UL (ref 0–0.04)
IMM GRANULOCYTES NFR BLD AUTO: 0.9 % (ref 0–0.5)
LYMPHOCYTES # BLD AUTO: 0.3 K/UL (ref 1–4.8)
LYMPHOCYTES NFR BLD: 14.6 % (ref 18–48)
MCH RBC QN AUTO: 27.2 PG (ref 27–31)
MCHC RBC AUTO-ENTMCNC: 31 G/DL (ref 32–36)
MCV RBC AUTO: 88 FL (ref 82–98)
MONOCYTES # BLD AUTO: 0.2 K/UL (ref 0.3–1)
MONOCYTES NFR BLD: 8 % (ref 4–15)
NEUTROPHILS # BLD AUTO: 1.6 K/UL (ref 1.8–7.7)
NEUTROPHILS NFR BLD: 76 % (ref 38–73)
NRBC BLD-RTO: 0 /100 WBC
PLATELET # BLD AUTO: 53 K/UL (ref 150–450)
PLATELET BLD QL SMEAR: ABNORMAL
PMV BLD AUTO: 10.3 FL (ref 9.2–12.9)
POTASSIUM SERPL-SCNC: 3.3 MMOL/L (ref 3.5–5.1)
PROT SERPL-MCNC: 6.2 G/DL (ref 6–8.4)
RBC # BLD AUTO: 3.6 M/UL (ref 4.6–6.2)
SODIUM SERPL-SCNC: 138 MMOL/L (ref 136–145)
WBC # BLD AUTO: 2.12 K/UL (ref 3.9–12.7)

## 2022-08-31 PROCEDURE — 1160F PR REVIEW ALL MEDS BY PRESCRIBER/CLIN PHARMACIST DOCUMENTED: ICD-10-PCS | Mod: CPTII,,, | Performed by: INTERNAL MEDICINE

## 2022-08-31 PROCEDURE — 99214 OFFICE O/P EST MOD 30 MIN: CPT | Mod: PBBFAC,25 | Performed by: INTERNAL MEDICINE

## 2022-08-31 PROCEDURE — 3288F PR FALLS RISK ASSESSMENT DOCUMENTED: ICD-10-PCS | Mod: CPTII,,, | Performed by: INTERNAL MEDICINE

## 2022-08-31 PROCEDURE — 99215 OFFICE O/P EST HI 40 MIN: CPT | Mod: S$PBB,,, | Performed by: INTERNAL MEDICINE

## 2022-08-31 PROCEDURE — 85025 COMPLETE CBC W/AUTO DIFF WBC: CPT | Performed by: INTERNAL MEDICINE

## 2022-08-31 PROCEDURE — 77014 PR  CT GUIDANCE PLACEMENT RAD THERAPY FIELDS: CPT | Mod: 26,,, | Performed by: RADIOLOGY

## 2022-08-31 PROCEDURE — 36415 COLL VENOUS BLD VENIPUNCTURE: CPT | Performed by: INTERNAL MEDICINE

## 2022-08-31 PROCEDURE — 1159F MED LIST DOCD IN RCRD: CPT | Mod: CPTII,,, | Performed by: INTERNAL MEDICINE

## 2022-08-31 PROCEDURE — 1126F AMNT PAIN NOTED NONE PRSNT: CPT | Mod: CPTII,,, | Performed by: INTERNAL MEDICINE

## 2022-08-31 PROCEDURE — 77014 PR  CT GUIDANCE PLACEMENT RAD THERAPY FIELDS: ICD-10-PCS | Mod: 26,,, | Performed by: RADIOLOGY

## 2022-08-31 PROCEDURE — 1126F PR PAIN SEVERITY QUANTIFIED, NO PAIN PRESENT: ICD-10-PCS | Mod: CPTII,,, | Performed by: INTERNAL MEDICINE

## 2022-08-31 PROCEDURE — 1101F PT FALLS ASSESS-DOCD LE1/YR: CPT | Mod: CPTII,,, | Performed by: INTERNAL MEDICINE

## 2022-08-31 PROCEDURE — 77386 HC IMRT, COMPLEX: CPT | Performed by: RADIOLOGY

## 2022-08-31 PROCEDURE — 3288F FALL RISK ASSESSMENT DOCD: CPT | Mod: CPTII,,, | Performed by: INTERNAL MEDICINE

## 2022-08-31 PROCEDURE — 99999 PR PBB SHADOW E&M-EST. PATIENT-LVL IV: ICD-10-PCS | Mod: PBBFAC,,, | Performed by: INTERNAL MEDICINE

## 2022-08-31 PROCEDURE — 1160F RVW MEDS BY RX/DR IN RCRD: CPT | Mod: CPTII,,, | Performed by: INTERNAL MEDICINE

## 2022-08-31 PROCEDURE — 1101F PR PT FALLS ASSESS DOC 0-1 FALLS W/OUT INJ PAST YR: ICD-10-PCS | Mod: CPTII,,, | Performed by: INTERNAL MEDICINE

## 2022-08-31 PROCEDURE — 99215 PR OFFICE/OUTPT VISIT, EST, LEVL V, 40-54 MIN: ICD-10-PCS | Mod: S$PBB,,, | Performed by: INTERNAL MEDICINE

## 2022-08-31 PROCEDURE — 99999 PR PBB SHADOW E&M-EST. PATIENT-LVL IV: CPT | Mod: PBBFAC,,, | Performed by: INTERNAL MEDICINE

## 2022-08-31 PROCEDURE — 80053 COMPREHEN METABOLIC PANEL: CPT | Performed by: INTERNAL MEDICINE

## 2022-08-31 PROCEDURE — 1159F PR MEDICATION LIST DOCUMENTED IN MEDICAL RECORD: ICD-10-PCS | Mod: CPTII,,, | Performed by: INTERNAL MEDICINE

## 2022-08-31 PROCEDURE — 77014 HC CT GUIDANCE RADIATION THERAPY FLDS PLACEMENT: CPT | Mod: TC | Performed by: RADIOLOGY

## 2022-08-31 RX ORDER — DEXAMETHASONE 2 MG/1
2 TABLET ORAL 2 TIMES DAILY WITH MEALS
Qty: 30 TABLET | Refills: 2 | Status: SHIPPED | OUTPATIENT
Start: 2022-08-31 | End: 2022-09-08 | Stop reason: SDUPTHER

## 2022-08-31 NOTE — PATIENT INSTRUCTIONS
Cbc Friday coordinate when will be here for radiation  RV in 1 wk with cbc, cmp and possible chemo

## 2022-08-31 NOTE — TELEPHONE ENCOUNTER
Nurse spoke with pt caregiver about having to r/s appts to 9/8. Nurse and caregiver went over all upcoming appts caregiver verbalized understanding

## 2022-08-31 NOTE — TELEPHONE ENCOUNTER
Called Bhavya, pt's daughter. She stated family greatly appreciated nutrition consult from last week but that pt and family do not want to schedule f/up visit at this time. Family is providing pt with as high calorie foods as possible, such as avocado, honey, and butter added to foods, as recommended by RD. Pt family reports pt no longer on appetite stimulant due to a blood clot and that pt appetite remains low with only a few bites of food eaten at a time. RD encouraged pt family to request f/up with RD at any time.     Signature: Geraldine Vega, MPH, RD, LDN

## 2022-08-31 NOTE — PLAN OF CARE
Day 17 of outpatient xrt to the lung. Doing well, denies any pain, no cough, no sore swallowing. Poor appetite, drinking boost, has appetite stimutant pills. Will continue to monitor.

## 2022-08-31 NOTE — PROGRESS NOTES
Subjective:      DATE OF VISIT: 8/31/22     ?  Patient ID:?Real Pagan is a 87 y.o. male.?? MR#: [id number]   ?   REFERRING PROVIDER: No referring provider defined for this encounter.     ? Primary Care Providers:  Shruthi Burr MD, MD (General)     CHIEF COMPLAINT: ?Referral for newly diagnosed lung cancer  ?   ONCOLOGIC DIAGNOSIS: Right lung squamous cell carcinoma [id number], stage IIIB  ?   CURRENT TREATMENT:  Plan for concurrent chemoradiation, see below    PAST TREATMENT: Biopsy  ?   ONCOLOGIC HISTORY:   5/6/22: Pt presented to PCP with complaints of unintentional weight loss, fatigue, non-productive cough, dyspnea on exertion, and shortness of breath. CXR demonstrated cavitary mass-like density seen projecting over the right hilar region.    5/24/22: PET CT demonstrated 6.2cm cavitary mass in right lung extending to right hilum and abutting pleura, avid max SUV 35, mildly enlarged right paratracheal node low level uptake max SUV 2.7, no distant metastases noted.    7/1/22: bronchoscopy - right lower lobe transbronchial and bronchial biopsies positive for squamous cell carcinoma  ?   Oncology History   Squamous cell carcinoma of right lung   7/11/2022 Initial Diagnosis    Squamous cell carcinoma of right lung     7/18/2022 Cancer Staged    Staging form: Lung, AJCC 8th Edition  - Clinical stage from 7/18/2022: Stage IIIB (cT3, cN2, cM0)       8/10/2022 -  Chemotherapy    Treatment Summary   Plan Name: OP NSCLC PACLITAXEL + CARBOPLATIN (AUC) QW + RADIATION  Treatment Goal: Curative  Status: Active  Start Date: 8/10/2022  End Date: 9/14/2022 (Planned)  Provider: Lindsay Paiz MD  Chemotherapy: CARBOplatin (PARAPLATIN) 155 mg in sodium chloride 0.9% 265.5 mL chemo infusion, 155 mg (100 % of original dose 155.6 mg), Intravenous, Clinic/HOD 1 time, 3 of 6 cycles  Dose modification:   (original dose 155.6 mg, Cycle 1),   (original dose 155.6 mg, Cycle 2),   (original dose 155.6 mg, Cycle  3)  Administration: 155 mg (8/10/2022), 155 mg (8/17/2022), 155 mg (8/24/2022)  PACLitaxeL (TAXOL) 45 mg/m2 = 66 mg in sodium chloride 0.9% 100 mL chemo infusion, 45 mg/m2 = 66 mg, Intravenous, Clinic/HOD 1 time, 3 of 6 cycles  Administration: 66 mg (8/10/2022), 66 mg (8/17/2022), 66 mg (8/24/2022)         Cancer Staging  Right lung squamous cell carcinoma [id number], stage IIIB    HPI:  We are assisted by [place]-speaking .  Improvement in left arm hand edema since starting rivaroxaban anticoagulation.  They did note some inflammation in feet which also is improved.  Continued fatigue and diminished appetite further weight loss.  Met with nutritionist recently.  Discontinued megestrol.    Review of systems  A comprehensive 14-point review of systems was reviewed with patient and was negative other than as specified above.   ?   Objective:      Vitals:    08/31/22 0940   BP: 118/61   Pulse: 82   Temp: 97.6 °F (36.4 °C)      ?   ECOG:?2  General appearance: Generally ill appearing, in no acute distress.   Head, eyes, ears, nose, and throat: moist mucous membranes.   Respiratory:  Normal work of breathing  Abdomen: cachectic, nontender, nondistended.   Extremities: Warm, without edema.   Neurologic: Alert and oriented. Grossly normal strength, coordination, and gait.   Skin: No rashes, ecchymoses or petechial lesion.   Psychiatric:  Normal mood and affect.    ?   Laboratory:  ?   Lab Visit on 08/31/2022   Component Date Value Ref Range Status    WBC 08/31/2022 2.12 (L)  3.90 - 12.70 K/uL Final    RBC 08/31/2022 3.60 (L)  4.60 - 6.20 M/uL Final    Hemoglobin 08/31/2022 9.8 (L)  14.0 - 18.0 g/dL Final    Hematocrit 08/31/2022 31.6 (L)  40.0 - 54.0 % Final    MCV 08/31/2022 88  82 - 98 fL Final    MCH 08/31/2022 27.2  27.0 - 31.0 pg Final    MCHC 08/31/2022 31.0 (L)  32.0 - 36.0 g/dL Final    RDW 08/31/2022 18.7 (H)  11.5 - 14.5 % Final    Platelets 08/31/2022 53 (L)  150 - 450 K/uL Final    MPV 08/31/2022 10.3   9.2 - 12.9 fL Final    Immature Granulocytes 08/31/2022 0.9 (H)  0.0 - 0.5 % Final    Gran # (ANC) 08/31/2022 1.6 (L)  1.8 - 7.7 K/uL Final    Immature Grans (Abs) 08/31/2022 0.02  0.00 - 0.04 K/uL Final    Lymph # 08/31/2022 0.3 (L)  1.0 - 4.8 K/uL Final    Mono # 08/31/2022 0.2 (L)  0.3 - 1.0 K/uL Final    Eos # 08/31/2022 0.0  0.0 - 0.5 K/uL Final    Baso # 08/31/2022 0.00  0.00 - 0.20 K/uL Final    nRBC 08/31/2022 0  0 /100 WBC Final    Gran % 08/31/2022 76.0 (H)  38.0 - 73.0 % Final    Lymph % 08/31/2022 14.6 (L)  18.0 - 48.0 % Final    Mono % 08/31/2022 8.0  4.0 - 15.0 % Final    Eosinophil % 08/31/2022 0.5  0.0 - 8.0 % Final    Basophil % 08/31/2022 0.0  0.0 - 1.9 % Final    Platelet Estimate 08/31/2022 Decreased (A)   Final    Differential Method 08/31/2022 Automated   Final    Sodium 08/31/2022 138  136 - 145 mmol/L Final    Potassium 08/31/2022 3.3 (L)  3.5 - 5.1 mmol/L Final    Chloride 08/31/2022 100  95 - 110 mmol/L Final    CO2 08/31/2022 26  23 - 29 mmol/L Final    Glucose 08/31/2022 151 (H)  70 - 110 mg/dL Final    BUN 08/31/2022 10  8 - 23 mg/dL Final    Creatinine 08/31/2022 0.7  0.5 - 1.4 mg/dL Final    Calcium 08/31/2022 8.4 (L)  8.7 - 10.5 mg/dL Final    Total Protein 08/31/2022 6.2  6.0 - 8.4 g/dL Final    Albumin 08/31/2022 2.4 (L)  3.5 - 5.2 g/dL Final    Total Bilirubin 08/31/2022 0.5  0.1 - 1.0 mg/dL Final    Alkaline Phosphatase 08/31/2022 127  55 - 135 U/L Final    AST 08/31/2022 13  10 - 40 U/L Final    ALT 08/31/2022 9 (L)  10 - 44 U/L Final    Anion Gap 08/31/2022 12  8 - 16 mmol/L Final    eGFR 08/31/2022 >60  >60 mL/min/1.73 m^2 Final       Imaging:    No results found for this or any previous visit (from the past 2160 hour(s)).  Results for orders placed or performed during the hospital encounter of 07/28/22 (from the past 2160 hour(s))   MRI Brain W WO Contrast    Impression    1. No evidence of metastatic disease to the brain.  2. Mild to moderate patchy T2 FLAIR hyperintensity  within the cerebral white matter likely relates to chronic microvascular ischemia.  3. Mild generalized atrophy.  4. Changes of chronic left maxillary sinusitis.      Electronically signed by: Gerard Hannah Jr., MD  Date:    07/28/2022  Time:    23:39     No results found for this or any previous visit (from the past 2160 hour(s)).  PET CT 5/25/22:  Head/neck: Normal physiologic activity present.    Chest: 6.2 x 5.2 cm cavitary mass lesion within the right lung which extends to the right hilum and abuts the posteromedial pleura.  Lesion is FDG avid within SUV max of 35.  Adjacent atelectasis/airspace opacity present.  No other FDG avid pulmonary nodule appreciated.  Correlate with diagnostic CT chest imaging.     No hilar FDG avid lymph nodes.  No axillary FDG avid lymph nodes.  Mildly enlarged right paratracheal lymph no demonstrating low level activity maintaining fatty hilum. Subcentimeter short axis more cranial right paratracheal lymph node present demonstrating a SUV max 2.7.  Subcentimeter subcarinal lymph node with low level activity.     Bilateral gynecomastia noted.     Abdomen/pelvis: Physiologic activity without concerning FDG avid focus.     Renal cysts present.  Prostate enlargement.  Left inguinal hernia containing a portion of the sigmoid colon.  No evidence of obstruction or strangulation.     Skeletal/subcutaneous structures: No FDG avid osseous lesion.     Impression:     Large cavitary FDG avid right lung mass.  Malignancy versus infectious etiology..  Mediastinal lymph nodes as above.  Other findings as above.    Pathology:    1. AND 2. RIGHT LOWER LOBE TRANSBRONCHIAL AND BRONCHIAL BIOPSIES:   POSITIVE FOR SQUAMOUS CARCINOMA    ?   Assessment/Plan:   Squamous cell carcinoma of right lung  -     dexAMETHasone (DECADRON) 2 MG tablet; Take 1 tablet (2 mg total) by mouth 2 (two) times daily with meals. for 10 days  Dispense: 30 tablet; Refill: 2    Acute thrombosis of cephalic vein, unspecified  laterality    Immunodeficiency due to chemotherapy    Loss of appetite  -     dexAMETHasone (DECADRON) 2 MG tablet; Take 1 tablet (2 mg total) by mouth 2 (two) times daily with meals. for 10 days  Dispense: 30 tablet; Refill: 2    Thrombocytopenia    Anemia in neoplastic disease       1. Squamous cell carcinoma of right lung    2. Acute thrombosis of cephalic vein, unspecified laterality    3. Immunodeficiency due to chemotherapy    4. Loss of appetite    5. Thrombocytopenia    6. Anemia in neoplastic disease            Plan:     Problem List Items Addressed This Visit          Oncology    Squamous cell carcinoma of right lung - Primary       Other    Loss of appetite     Other Visit Diagnoses       Acute thrombosis of cephalic vein, unspecified laterality        Immunodeficiency due to chemotherapy        Thrombocytopenia        Anemia in neoplastic disease                # Squamous Cell Carcinoma of Right Lung, Stage IIIB; [id number]   biopsy-proven squamous cell carcinoma transbronchial biopsy; PET with mild avidity of right paratracheal node cannot exclude disease involvement.  We did discuss consideration of bronchoscopy/EBUS for pathologic evaluation of lymph node involvement however given high risk lesion central, larger size and avidity, in discussion with Radiation Oncology colleague Dr. Vanegas preference to avoid additional procedure and delay in initiation of therapy in favor of proceeding with definitive chemoradiation including within radiation field with attempt to limit toxicity.  Tenuous functional status and discussed with patient and family present with assistance of  diagnosis, treatment plan and risks and benefits of therapy with chemoradiation plan and close monitoring for toxicity however concurrent chemotherapy with carboplatin paclitaxel is preferred given locally advanced malignancy.    Labs with progressive neutropenia and thrombocytopenia will hold treatment today.  Discussed  bleeding precautions and close follow-up repeat CBC if decline in platelets further will need to hold blood thinner and discuss holding if any signs or symptoms of bleeding.  Recheck in 1 week to determine if okay to proceed with chemotherapy.  Will continue with radiation follow-up.    Iron deficiency anemia:  Status post IV iron therapy completed 2 doses Feraheme on 08/15/2022.  Stable anemia but will continue to trend with ongoing therapy.      LUE superficial thrombosis: 8/23/22 left upper extremity Doppler showing extensive cephalic vein complete occlusion.  No DVT.  Given extensive nature of superficial thrombosis and high risk patient for progression given hypercoagulability with ongoing malignancy lung cancer and tobacco use recommend anticoagulation.  Sent rivaroxaban 10 mg daily.  Instructed him to discontinue megestrol.  Will need close monitoring for any bleeding and for improvement.    Anorexia: Dronabinol not approved.  Megace prescribed by colleague, which I have held due to new thrombosis acutely in this setting.  Prescribed dexamethasone low dose for appetite stimulation.  Nutrition follow-up.    Follow-Up:     Cycle 4 carbo/taxol holding today due to thrombocytopenia  Revisit 1 week CBC, CMP, cycle 4 consideration

## 2022-09-01 ENCOUNTER — HOSPITAL ENCOUNTER (OUTPATIENT)
Dept: RADIATION THERAPY | Facility: HOSPITAL | Age: 87
Discharge: HOME | End: 2022-09-01
Attending: RADIOLOGY
Payer: MEDICAID

## 2022-09-01 PROCEDURE — 77386 HC IMRT, COMPLEX: CPT | Performed by: RADIOLOGY

## 2022-09-01 PROCEDURE — 77014 PR  CT GUIDANCE PLACEMENT RAD THERAPY FIELDS: ICD-10-PCS | Mod: 26,,, | Performed by: RADIOLOGY

## 2022-09-01 PROCEDURE — 77014 PR  CT GUIDANCE PLACEMENT RAD THERAPY FIELDS: CPT | Mod: 26,,, | Performed by: RADIOLOGY

## 2022-09-01 PROCEDURE — 77014 HC CT GUIDANCE RADIATION THERAPY FLDS PLACEMENT: CPT | Mod: TC | Performed by: RADIOLOGY

## 2022-09-02 ENCOUNTER — LAB VISIT (OUTPATIENT)
Dept: LAB | Facility: HOSPITAL | Age: 87
End: 2022-09-02
Attending: INTERNAL MEDICINE
Payer: MEDICAID

## 2022-09-02 DIAGNOSIS — C34.91 BRONCHOGENIC CANCER OF RIGHT LUNG: ICD-10-CM

## 2022-09-02 LAB
BASOPHILS # BLD AUTO: 0.01 K/UL (ref 0–0.2)
BASOPHILS NFR BLD: 0.5 % (ref 0–1.9)
DIFFERENTIAL METHOD: ABNORMAL
EOSINOPHIL # BLD AUTO: 0 K/UL (ref 0–0.5)
EOSINOPHIL NFR BLD: 0.5 % (ref 0–8)
ERYTHROCYTE [DISTWIDTH] IN BLOOD BY AUTOMATED COUNT: 18.9 % (ref 11.5–14.5)
HCT VFR BLD AUTO: 30.5 % (ref 40–54)
HGB BLD-MCNC: 9.5 G/DL (ref 14–18)
IMM GRANULOCYTES # BLD AUTO: 0.01 K/UL (ref 0–0.04)
IMM GRANULOCYTES NFR BLD AUTO: 0.5 % (ref 0–0.5)
LYMPHOCYTES # BLD AUTO: 0.3 K/UL (ref 1–4.8)
LYMPHOCYTES NFR BLD: 14.4 % (ref 18–48)
MCH RBC QN AUTO: 27.2 PG (ref 27–31)
MCHC RBC AUTO-ENTMCNC: 31.1 G/DL (ref 32–36)
MCV RBC AUTO: 87 FL (ref 82–98)
MONOCYTES # BLD AUTO: 0.2 K/UL (ref 0.3–1)
MONOCYTES NFR BLD: 9.9 % (ref 4–15)
NEUTROPHILS # BLD AUTO: 1.5 K/UL (ref 1.8–7.7)
NEUTROPHILS NFR BLD: 74.2 % (ref 38–73)
NRBC BLD-RTO: 0 /100 WBC
PLATELET # BLD AUTO: 54 K/UL (ref 150–450)
PMV BLD AUTO: 8.5 FL (ref 9.2–12.9)
RBC # BLD AUTO: 3.49 M/UL (ref 4.6–6.2)
WBC # BLD AUTO: 2.02 K/UL (ref 3.9–12.7)

## 2022-09-02 PROCEDURE — 85025 COMPLETE CBC W/AUTO DIFF WBC: CPT | Performed by: INTERNAL MEDICINE

## 2022-09-02 PROCEDURE — 77014 PR  CT GUIDANCE PLACEMENT RAD THERAPY FIELDS: ICD-10-PCS | Mod: 26,,, | Performed by: RADIOLOGY

## 2022-09-02 PROCEDURE — 36415 COLL VENOUS BLD VENIPUNCTURE: CPT | Performed by: INTERNAL MEDICINE

## 2022-09-02 PROCEDURE — 77014 HC CT GUIDANCE RADIATION THERAPY FLDS PLACEMENT: CPT | Mod: TC | Performed by: RADIOLOGY

## 2022-09-02 PROCEDURE — 77386 HC IMRT, COMPLEX: CPT | Performed by: RADIOLOGY

## 2022-09-02 PROCEDURE — 77014 PR  CT GUIDANCE PLACEMENT RAD THERAPY FIELDS: CPT | Mod: 26,,, | Performed by: RADIOLOGY

## 2022-09-06 PROCEDURE — 77014 HC CT GUIDANCE RADIATION THERAPY FLDS PLACEMENT: CPT | Mod: TC | Performed by: RADIOLOGY

## 2022-09-06 PROCEDURE — 77386 HC IMRT, COMPLEX: CPT | Performed by: RADIOLOGY

## 2022-09-06 PROCEDURE — 77014 PR  CT GUIDANCE PLACEMENT RAD THERAPY FIELDS: CPT | Mod: 26,,, | Performed by: RADIOLOGY

## 2022-09-06 PROCEDURE — 77014 PR  CT GUIDANCE PLACEMENT RAD THERAPY FIELDS: ICD-10-PCS | Mod: 26,,, | Performed by: RADIOLOGY

## 2022-09-07 ENCOUNTER — LAB VISIT (OUTPATIENT)
Dept: LAB | Facility: HOSPITAL | Age: 87
End: 2022-09-07
Attending: INTERNAL MEDICINE
Payer: MEDICAID

## 2022-09-07 ENCOUNTER — TELEPHONE (OUTPATIENT)
Dept: HEMATOLOGY/ONCOLOGY | Facility: CLINIC | Age: 87
End: 2022-09-07
Payer: MEDICAID

## 2022-09-07 ENCOUNTER — DOCUMENTATION ONLY (OUTPATIENT)
Dept: RADIATION ONCOLOGY | Facility: CLINIC | Age: 87
End: 2022-09-07
Payer: MEDICAID

## 2022-09-07 DIAGNOSIS — C34.91 BRONCHOGENIC CANCER OF RIGHT LUNG: ICD-10-CM

## 2022-09-07 LAB
ALBUMIN SERPL BCP-MCNC: 2.7 G/DL (ref 3.5–5.2)
ALP SERPL-CCNC: 151 U/L (ref 55–135)
ALT SERPL W/O P-5'-P-CCNC: 16 U/L (ref 10–44)
ANION GAP SERPL CALC-SCNC: 10 MMOL/L (ref 8–16)
ANISOCYTOSIS BLD QL SMEAR: ABNORMAL
AST SERPL-CCNC: 13 U/L (ref 10–40)
BASOPHILS NFR BLD: 0 % (ref 0–1.9)
BILIRUB SERPL-MCNC: 0.3 MG/DL (ref 0.1–1)
BUN SERPL-MCNC: 13 MG/DL (ref 8–23)
CALCIUM SERPL-MCNC: 8.5 MG/DL (ref 8.7–10.5)
CHLORIDE SERPL-SCNC: 103 MMOL/L (ref 95–110)
CO2 SERPL-SCNC: 27 MMOL/L (ref 23–29)
CREAT SERPL-MCNC: 0.6 MG/DL (ref 0.5–1.4)
DACRYOCYTES BLD QL SMEAR: ABNORMAL
DIFFERENTIAL METHOD: ABNORMAL
EOSINOPHIL NFR BLD: 0 % (ref 0–8)
ERYTHROCYTE [DISTWIDTH] IN BLOOD BY AUTOMATED COUNT: 20 % (ref 11.5–14.5)
EST. GFR  (NO RACE VARIABLE): >60 ML/MIN/1.73 M^2
GLUCOSE SERPL-MCNC: 125 MG/DL (ref 70–110)
HCT VFR BLD AUTO: 31.6 % (ref 40–54)
HGB BLD-MCNC: 9.9 G/DL (ref 14–18)
IMM GRANULOCYTES # BLD AUTO: ABNORMAL K/UL (ref 0–0.04)
IMM GRANULOCYTES NFR BLD AUTO: ABNORMAL % (ref 0–0.5)
LYMPHOCYTES NFR BLD: 9 % (ref 18–48)
MCH RBC QN AUTO: 28 PG (ref 27–31)
MCHC RBC AUTO-ENTMCNC: 31.3 G/DL (ref 32–36)
MCV RBC AUTO: 89 FL (ref 82–98)
METAMYELOCYTES NFR BLD MANUAL: 3 %
MONOCYTES NFR BLD: 14 % (ref 4–15)
NEUTROPHILS NFR BLD: 72 % (ref 38–73)
NEUTS BAND NFR BLD MANUAL: 2 %
NRBC BLD-RTO: 0 /100 WBC
OVALOCYTES BLD QL SMEAR: ABNORMAL
PLATELET # BLD AUTO: 78 K/UL (ref 150–450)
PLATELET BLD QL SMEAR: ABNORMAL
PMV BLD AUTO: 9.4 FL (ref 9.2–12.9)
POIKILOCYTOSIS BLD QL SMEAR: SLIGHT
POLYCHROMASIA BLD QL SMEAR: ABNORMAL
POTASSIUM SERPL-SCNC: 3.5 MMOL/L (ref 3.5–5.1)
PROT SERPL-MCNC: 6.4 G/DL (ref 6–8.4)
RBC # BLD AUTO: 3.54 M/UL (ref 4.6–6.2)
SODIUM SERPL-SCNC: 140 MMOL/L (ref 136–145)
STOMATOCYTES BLD QL SMEAR: PRESENT
TARGETS BLD QL SMEAR: ABNORMAL
WBC # BLD AUTO: 1.98 K/UL (ref 3.9–12.7)

## 2022-09-07 PROCEDURE — 80053 COMPREHEN METABOLIC PANEL: CPT | Performed by: INTERNAL MEDICINE

## 2022-09-07 PROCEDURE — 77336 RADIATION PHYSICS CONSULT: CPT | Performed by: RADIOLOGY

## 2022-09-07 PROCEDURE — 77014 HC CT GUIDANCE RADIATION THERAPY FLDS PLACEMENT: CPT | Mod: TC | Performed by: RADIOLOGY

## 2022-09-07 PROCEDURE — 77014 PR  CT GUIDANCE PLACEMENT RAD THERAPY FIELDS: ICD-10-PCS | Mod: 26,,, | Performed by: RADIOLOGY

## 2022-09-07 PROCEDURE — 36415 COLL VENOUS BLD VENIPUNCTURE: CPT | Performed by: INTERNAL MEDICINE

## 2022-09-07 PROCEDURE — 77014 PR  CT GUIDANCE PLACEMENT RAD THERAPY FIELDS: CPT | Mod: 26,,, | Performed by: RADIOLOGY

## 2022-09-07 PROCEDURE — 85007 BL SMEAR W/DIFF WBC COUNT: CPT | Performed by: INTERNAL MEDICINE

## 2022-09-07 PROCEDURE — 77386 HC IMRT, COMPLEX: CPT | Performed by: RADIOLOGY

## 2022-09-07 PROCEDURE — 85027 COMPLETE CBC AUTOMATED: CPT | Performed by: INTERNAL MEDICINE

## 2022-09-07 NOTE — PLAN OF CARE
Day 21 outpatient xrt to lung. Doing ok, on steroid, appetite increased, weight increased 2lbs. Denies any pain, no sob, no sore swallowing, cough remains unchanged. Will continue to monitor.

## 2022-09-08 ENCOUNTER — OFFICE VISIT (OUTPATIENT)
Dept: HEMATOLOGY/ONCOLOGY | Facility: CLINIC | Age: 87
End: 2022-09-08
Payer: MEDICAID

## 2022-09-08 VITALS
OXYGEN SATURATION: 100 % | HEIGHT: 64 IN | DIASTOLIC BLOOD PRESSURE: 54 MMHG | SYSTOLIC BLOOD PRESSURE: 120 MMHG | BODY MASS INDEX: 16.5 KG/M2 | HEART RATE: 65 BPM

## 2022-09-08 DIAGNOSIS — I82.619: Primary | ICD-10-CM

## 2022-09-08 DIAGNOSIS — T45.1X5A IMMUNODEFICIENCY DUE TO CHEMOTHERAPY: ICD-10-CM

## 2022-09-08 DIAGNOSIS — D63.0 ANEMIA IN NEOPLASTIC DISEASE: ICD-10-CM

## 2022-09-08 DIAGNOSIS — D69.6 THROMBOCYTOPENIA: ICD-10-CM

## 2022-09-08 DIAGNOSIS — C34.91 SQUAMOUS CELL CARCINOMA OF RIGHT LUNG: ICD-10-CM

## 2022-09-08 DIAGNOSIS — T45.1X5A CHEMOTHERAPY INDUCED NEUTROPENIA: ICD-10-CM

## 2022-09-08 DIAGNOSIS — Z79.899 IMMUNODEFICIENCY DUE TO CHEMOTHERAPY: ICD-10-CM

## 2022-09-08 DIAGNOSIS — R63.0 LOSS OF APPETITE: ICD-10-CM

## 2022-09-08 DIAGNOSIS — D84.821 IMMUNODEFICIENCY DUE TO CHEMOTHERAPY: ICD-10-CM

## 2022-09-08 DIAGNOSIS — D70.1 CHEMOTHERAPY INDUCED NEUTROPENIA: ICD-10-CM

## 2022-09-08 PROCEDURE — 1159F PR MEDICATION LIST DOCUMENTED IN MEDICAL RECORD: ICD-10-PCS | Mod: CPTII,,, | Performed by: INTERNAL MEDICINE

## 2022-09-08 PROCEDURE — 1160F RVW MEDS BY RX/DR IN RCRD: CPT | Mod: CPTII,,, | Performed by: INTERNAL MEDICINE

## 2022-09-08 PROCEDURE — 99215 PR OFFICE/OUTPT VISIT, EST, LEVL V, 40-54 MIN: ICD-10-PCS | Mod: S$PBB,,, | Performed by: INTERNAL MEDICINE

## 2022-09-08 PROCEDURE — 77386 HC IMRT, COMPLEX: CPT | Performed by: RADIOLOGY

## 2022-09-08 PROCEDURE — 1159F MED LIST DOCD IN RCRD: CPT | Mod: CPTII,,, | Performed by: INTERNAL MEDICINE

## 2022-09-08 PROCEDURE — 1160F PR REVIEW ALL MEDS BY PRESCRIBER/CLIN PHARMACIST DOCUMENTED: ICD-10-PCS | Mod: CPTII,,, | Performed by: INTERNAL MEDICINE

## 2022-09-08 PROCEDURE — 99999 PR PBB SHADOW E&M-EST. PATIENT-LVL III: ICD-10-PCS | Mod: PBBFAC,,, | Performed by: INTERNAL MEDICINE

## 2022-09-08 PROCEDURE — 77014 HC CT GUIDANCE RADIATION THERAPY FLDS PLACEMENT: CPT | Mod: TC | Performed by: RADIOLOGY

## 2022-09-08 PROCEDURE — 99215 OFFICE O/P EST HI 40 MIN: CPT | Mod: S$PBB,,, | Performed by: INTERNAL MEDICINE

## 2022-09-08 PROCEDURE — 99213 OFFICE O/P EST LOW 20 MIN: CPT | Mod: PBBFAC | Performed by: INTERNAL MEDICINE

## 2022-09-08 PROCEDURE — 99999 PR PBB SHADOW E&M-EST. PATIENT-LVL III: CPT | Mod: PBBFAC,,, | Performed by: INTERNAL MEDICINE

## 2022-09-08 PROCEDURE — 1126F AMNT PAIN NOTED NONE PRSNT: CPT | Mod: CPTII,,, | Performed by: INTERNAL MEDICINE

## 2022-09-08 PROCEDURE — 1126F PR PAIN SEVERITY QUANTIFIED, NO PAIN PRESENT: ICD-10-PCS | Mod: CPTII,,, | Performed by: INTERNAL MEDICINE

## 2022-09-08 PROCEDURE — 77014 PR  CT GUIDANCE PLACEMENT RAD THERAPY FIELDS: ICD-10-PCS | Mod: 26,,, | Performed by: RADIOLOGY

## 2022-09-08 PROCEDURE — 77014 PR  CT GUIDANCE PLACEMENT RAD THERAPY FIELDS: CPT | Mod: 26,,, | Performed by: RADIOLOGY

## 2022-09-08 RX ORDER — DEXAMETHASONE 2 MG/1
2 TABLET ORAL 2 TIMES DAILY WITH MEALS
Qty: 30 TABLET | Refills: 2 | Status: SHIPPED | OUTPATIENT
Start: 2022-09-08 | End: 2022-09-18

## 2022-09-08 NOTE — PROGRESS NOTES
Subjective:      DATE OF VISIT: 9/8/22     ?  Patient ID:?Real Pagan is a 87 y.o. male.?? MR#: [id number]   ?   PRIMARY ONCOLOGIST: Dr. Paiz    ? Primary Care Providers:  Shruthi Burr MD, MD (General)     CHIEF COMPLAINT: ?Follow-up on chemoradiation?     ONCOLOGIC DIAGNOSIS: Right lung squamous cell carcinoma [id number], stage IIIB  ?   CURRENT TREATMENT:  concurrent chemoradiation with carboplatin paclitaxel    PAST TREATMENT: Biopsy  ?   ONCOLOGIC HISTORY:   5/6/22: Pt presented to PCP with complaints of unintentional weight loss, fatigue, non-productive cough, dyspnea on exertion, and shortness of breath. CXR demonstrated cavitary mass-like density seen projecting over the right hilar region.    5/24/22: PET CT demonstrated 6.2cm cavitary mass in right lung extending to right hilum and abutting pleura, avid max SUV 35, mildly enlarged right paratracheal node low level uptake max SUV 2.7, no distant metastases noted.    7/1/22: bronchoscopy - right lower lobe transbronchial and bronchial biopsies positive for squamous cell carcinoma  ?   Oncology History   Squamous cell carcinoma of right lung   7/11/2022 Initial Diagnosis    Squamous cell carcinoma of right lung     7/18/2022 Cancer Staged    Staging form: Lung, AJCC 8th Edition  - Clinical stage from 7/18/2022: Stage IIIB (cT3, cN2, cM0)       8/10/2022 -  Chemotherapy    Treatment Summary   Plan Name: OP NSCLC PACLITAXEL + CARBOPLATIN (AUC) QW + RADIATION  Treatment Goal: Curative  Status: Active  Start Date: 8/10/2022  End Date: 9/14/2022 (Planned)  Provider: Lindsay Paiz MD  Chemotherapy: CARBOplatin (PARAPLATIN) 155 mg in sodium chloride 0.9% 265.5 mL chemo infusion, 155 mg (100 % of original dose 155.6 mg), Intravenous, Clinic/HOD 1 time, 3 of 6 cycles  Dose modification:   (original dose 155.6 mg, Cycle 1),   (original dose 155.6 mg, Cycle 2),   (original dose 155.6 mg, Cycle 3)  Administration: 155 mg (8/10/2022), 155 mg  (8/17/2022), 155 mg (8/24/2022)  PACLitaxeL (TAXOL) 45 mg/m2 = 66 mg in sodium chloride 0.9% 100 mL chemo infusion, 45 mg/m2 = 66 mg, Intravenous, Clinic/HOD 1 time, 3 of 6 cycles  Administration: 66 mg (8/10/2022), 66 mg (8/17/2022), 66 mg (8/24/2022)         Cancer Staging  Right lung squamous cell carcinoma [id number], stage IIIB    HPI:  We are assisted by [place]-speaking .  Continued improvement in hand edema now resolved continued on rivaroxaban.  Denies evidence of bleeding.  No other edema.  Improved energy and appetite with a couple lb weight gained since last visit.  Taking dexamethasone has helped with anorexia.    No fevers or chills.      A comprehensive 14-point review of systems was reviewed with patient and was negative other than as specified above.   ?   Objective:      Vitals:    09/08/22 0933   BP: (!) 120/54   Pulse: 65      ?   ECOG:?2  General appearance: Generally well appearing, in no acute distress.   Head, eyes, ears, nose, and throat: moist mucous membranes.   Respiratory:  Normal work of breathing  Abdomen: cachectic, nontender, nondistended.   Extremities: Warm, without edema.   Neurologic: Alert and oriented. Grossly normal strength, coordination, and gait.   Skin: No rashes, ecchymoses or petechial lesion.   Psychiatric:  Normal mood and affect.    ?   Laboratory:  ?   No visits with results within 1 Day(s) from this visit.   Latest known visit with results is:   Lab Visit on 09/07/2022   Component Date Value Ref Range Status    WBC 09/07/2022 1.98 (LL)  3.90 - 12.70 K/uL Final    RBC 09/07/2022 3.54 (L)  4.60 - 6.20 M/uL Final    Hemoglobin 09/07/2022 9.9 (L)  14.0 - 18.0 g/dL Final    Hematocrit 09/07/2022 31.6 (L)  40.0 - 54.0 % Final    MCV 09/07/2022 89  82 - 98 fL Final    MCH 09/07/2022 28.0  27.0 - 31.0 pg Final    MCHC 09/07/2022 31.3 (L)  32.0 - 36.0 g/dL Final    RDW 09/07/2022 20.0 (H)  11.5 - 14.5 % Final    Platelets 09/07/2022 78 (L)  150 - 450 K/uL Final     MPV 09/07/2022 9.4  9.2 - 12.9 fL Final    Immature Granulocytes 09/07/2022 CANCELED  0.0 - 0.5 % Final    Immature Grans (Abs) 09/07/2022 CANCELED  0.00 - 0.04 K/uL Final    nRBC 09/07/2022 0  0 /100 WBC Final    Gran % 09/07/2022 72.0  38.0 - 73.0 % Final    Lymph % 09/07/2022 9.0 (L)  18.0 - 48.0 % Final    Mono % 09/07/2022 14.0  4.0 - 15.0 % Final    Eosinophil % 09/07/2022 0.0  0.0 - 8.0 % Final    Basophil % 09/07/2022 0.0  0.0 - 1.9 % Final    Bands 09/07/2022 2.0  % Final    Metamyelocytes 09/07/2022 3.0  % Final    Platelet Estimate 09/07/2022 Decreased (A)   Final    Aniso 09/07/2022 Moderate   Final    Poik 09/07/2022 Slight   Final    Poly 09/07/2022 Occasional   Final    Ovalocytes 09/07/2022 Occasional   Final    Target Cells 09/07/2022 Occasional   Final    Tear Drop Cells 09/07/2022 Occasional   Final    Stomatocytes 09/07/2022 Present   Final    Differential Method 09/07/2022 Manual   Final    Sodium 09/07/2022 140  136 - 145 mmol/L Final    Potassium 09/07/2022 3.5  3.5 - 5.1 mmol/L Final    Chloride 09/07/2022 103  95 - 110 mmol/L Final    CO2 09/07/2022 27  23 - 29 mmol/L Final    Glucose 09/07/2022 125 (H)  70 - 110 mg/dL Final    BUN 09/07/2022 13  8 - 23 mg/dL Final    Creatinine 09/07/2022 0.6  0.5 - 1.4 mg/dL Final    Calcium 09/07/2022 8.5 (L)  8.7 - 10.5 mg/dL Final    Total Protein 09/07/2022 6.4  6.0 - 8.4 g/dL Final    Albumin 09/07/2022 2.7 (L)  3.5 - 5.2 g/dL Final    Total Bilirubin 09/07/2022 0.3  0.1 - 1.0 mg/dL Final    Alkaline Phosphatase 09/07/2022 151 (H)  55 - 135 U/L Final    AST 09/07/2022 13  10 - 40 U/L Final    ALT 09/07/2022 16  10 - 44 U/L Final    Anion Gap 09/07/2022 10  8 - 16 mmol/L Final    eGFR 09/07/2022 >60  >60 mL/min/1.73 m^2 Final       Imaging:    No results found for this or any previous visit (from the past 2160 hour(s)).  Results for orders placed or performed during the hospital encounter of 07/28/22 (from the past 2160 hour(s))   MRI Brain W WO  Contrast    Impression    1. No evidence of metastatic disease to the brain.  2. Mild to moderate patchy T2 FLAIR hyperintensity within the cerebral white matter likely relates to chronic microvascular ischemia.  3. Mild generalized atrophy.  4. Changes of chronic left maxillary sinusitis.      Electronically signed by: Gerard Hannah Jr., MD  Date:    07/28/2022  Time:    23:39     No results found for this or any previous visit (from the past 2160 hour(s)).  PET CT 5/25/22:  Head/neck: Normal physiologic activity present.    Chest: 6.2 x 5.2 cm cavitary mass lesion within the right lung which extends to the right hilum and abuts the posteromedial pleura.  Lesion is FDG avid within SUV max of 35.  Adjacent atelectasis/airspace opacity present.  No other FDG avid pulmonary nodule appreciated.  Correlate with diagnostic CT chest imaging.     No hilar FDG avid lymph nodes.  No axillary FDG avid lymph nodes.  Mildly enlarged right paratracheal lymph no demonstrating low level activity maintaining fatty hilum. Subcentimeter short axis more cranial right paratracheal lymph node present demonstrating a SUV max 2.7.  Subcentimeter subcarinal lymph node with low level activity.     Bilateral gynecomastia noted.     Abdomen/pelvis: Physiologic activity without concerning FDG avid focus.     Renal cysts present.  Prostate enlargement.  Left inguinal hernia containing a portion of the sigmoid colon.  No evidence of obstruction or strangulation.     Skeletal/subcutaneous structures: No FDG avid osseous lesion.     Impression:     Large cavitary FDG avid right lung mass.  Malignancy versus infectious etiology..  Mediastinal lymph nodes as above.  Other findings as above.    Pathology:    1. AND 2. RIGHT LOWER LOBE TRANSBRONCHIAL AND BRONCHIAL BIOPSIES:   POSITIVE FOR SQUAMOUS CARCINOMA    ?   Assessment/Plan:   Acute thrombosis of cephalic vein, unspecified laterality    Squamous cell carcinoma of right lung    Immunodeficiency  due to chemotherapy    Thrombocytopenia    Anemia in neoplastic disease    Chemotherapy induced neutropenia       1. Acute thrombosis of cephalic vein, unspecified laterality    2. Squamous cell carcinoma of right lung    3. Immunodeficiency due to chemotherapy    4. Thrombocytopenia    5. Anemia in neoplastic disease    6. Chemotherapy induced neutropenia            Plan:     Problem List Items Addressed This Visit          Oncology    Squamous cell carcinoma of right lung     Other Visit Diagnoses       Acute thrombosis of cephalic vein, unspecified laterality    -  Primary    Immunodeficiency due to chemotherapy        Thrombocytopenia        Anemia in neoplastic disease        Chemotherapy induced neutropenia                # Squamous Cell Carcinoma of Right Lung, Stage IIIB; [id number]   biopsy-proven squamous cell carcinoma transbronchial biopsy; PET with mild avidity of right paratracheal node cannot exclude disease involvement.  We did discuss consideration of bronchoscopy/EBUS for pathologic evaluation of lymph node involvement however given high risk lesion central, larger size and avidity, in discussion with Radiation Oncology colleague Dr. Vanegas preference to avoid additional procedure and delay in initiation of therapy in favor of proceeding with definitive chemoradiation including within radiation field with attempt to limit toxicity.  Tenuous functional status and discussed with patient and family present with assistance of  diagnosis, treatment plan and risks and benefits of therapy with chemoradiation plan and close monitoring for toxicity however concurrent chemotherapy with carboplatin paclitaxel is preferred given locally advanced malignancy.    Labs with progressive neutropenia will need to hold treatment today ANC < 1.5.  Improvement in thrombocytopenia.  Proceed cautiously with anticoagulation per below.    Will hold treatment today and recheck in 1 week.  Continued on radiation  Iron  deficiency anemia:  Status post IV iron therapy completed 2 doses Feraheme on 08/15/2022.  Stable anemia but will continue to trend with ongoing therapy.      LUE superficial thrombosis: 8/23/22 left upper extremity Doppler showing extensive cephalic vein complete occlusion.  No DVT.  Given extensive nature of superficial thrombosis and high risk patient for progression given hypercoagulability with ongoing malignancy lung cancer and tobacco use recommend anticoagulation.  Sent rivaroxaban 10 mg daily.  Instructed him to discontinue megestrol.  Will need close monitoring for any bleeding and for improvement.    Anorexia: Dronabinol not approved.  Megace prescribed by colleague, which I have held due to new thrombosis acutely in this setting.  Prescribed dexamethasone low dose for appetite stimulation which has been helpful with couple lb weight gain.  Nutrition follow-up.    Follow-Up:     Cycle 4 carbo/taxol holding today due to neutropenia  Revisit 1 week CBC, CMP, cycle 4 consideration

## 2022-09-09 ENCOUNTER — PATIENT MESSAGE (OUTPATIENT)
Dept: NUTRITION | Facility: CLINIC | Age: 87
End: 2022-09-09
Payer: MEDICAID

## 2022-09-09 PROCEDURE — 77014 PR  CT GUIDANCE PLACEMENT RAD THERAPY FIELDS: ICD-10-PCS | Mod: 26,,, | Performed by: RADIOLOGY

## 2022-09-09 PROCEDURE — 77014 HC CT GUIDANCE RADIATION THERAPY FLDS PLACEMENT: CPT | Mod: TC | Performed by: RADIOLOGY

## 2022-09-09 PROCEDURE — 77386 HC IMRT, COMPLEX: CPT | Performed by: RADIOLOGY

## 2022-09-09 PROCEDURE — 77014 PR  CT GUIDANCE PLACEMENT RAD THERAPY FIELDS: CPT | Mod: 26,,, | Performed by: RADIOLOGY

## 2022-09-12 PROCEDURE — 77014 PR  CT GUIDANCE PLACEMENT RAD THERAPY FIELDS: CPT | Mod: 26,,, | Performed by: RADIOLOGY

## 2022-09-12 PROCEDURE — 77014 HC CT GUIDANCE RADIATION THERAPY FLDS PLACEMENT: CPT | Mod: TC | Performed by: RADIOLOGY

## 2022-09-12 PROCEDURE — 77014 PR  CT GUIDANCE PLACEMENT RAD THERAPY FIELDS: ICD-10-PCS | Mod: 26,,, | Performed by: RADIOLOGY

## 2022-09-12 PROCEDURE — 77386 HC IMRT, COMPLEX: CPT | Performed by: RADIOLOGY

## 2022-09-13 PROCEDURE — 77014 HC CT GUIDANCE RADIATION THERAPY FLDS PLACEMENT: CPT | Mod: TC | Performed by: RADIOLOGY

## 2022-09-13 PROCEDURE — 77014 PR  CT GUIDANCE PLACEMENT RAD THERAPY FIELDS: ICD-10-PCS | Mod: 26,,, | Performed by: RADIOLOGY

## 2022-09-13 PROCEDURE — 77386 HC IMRT, COMPLEX: CPT | Performed by: RADIOLOGY

## 2022-09-13 PROCEDURE — 77014 PR  CT GUIDANCE PLACEMENT RAD THERAPY FIELDS: CPT | Mod: 26,,, | Performed by: RADIOLOGY

## 2022-09-14 ENCOUNTER — DOCUMENTATION ONLY (OUTPATIENT)
Dept: RADIATION ONCOLOGY | Facility: CLINIC | Age: 87
End: 2022-09-14
Payer: MEDICAID

## 2022-09-14 ENCOUNTER — LAB VISIT (OUTPATIENT)
Dept: LAB | Facility: HOSPITAL | Age: 87
End: 2022-09-14
Attending: INTERNAL MEDICINE
Payer: MEDICAID

## 2022-09-14 DIAGNOSIS — C34.91 BRONCHOGENIC CANCER OF RIGHT LUNG: ICD-10-CM

## 2022-09-14 LAB
ALBUMIN SERPL BCP-MCNC: 2.8 G/DL (ref 3.5–5.2)
ALP SERPL-CCNC: 145 U/L (ref 55–135)
ALT SERPL W/O P-5'-P-CCNC: 13 U/L (ref 10–44)
ANION GAP SERPL CALC-SCNC: 11 MMOL/L (ref 8–16)
AST SERPL-CCNC: 12 U/L (ref 10–40)
BASOPHILS # BLD AUTO: 0 K/UL (ref 0–0.2)
BASOPHILS NFR BLD: 0 % (ref 0–1.9)
BILIRUB SERPL-MCNC: 0.3 MG/DL (ref 0.1–1)
BUN SERPL-MCNC: 14 MG/DL (ref 8–23)
CALCIUM SERPL-MCNC: 8.6 MG/DL (ref 8.7–10.5)
CHLORIDE SERPL-SCNC: 102 MMOL/L (ref 95–110)
CO2 SERPL-SCNC: 27 MMOL/L (ref 23–29)
CREAT SERPL-MCNC: 0.6 MG/DL (ref 0.5–1.4)
DIFFERENTIAL METHOD: ABNORMAL
EOSINOPHIL # BLD AUTO: 0 K/UL (ref 0–0.5)
EOSINOPHIL NFR BLD: 0 % (ref 0–8)
ERYTHROCYTE [DISTWIDTH] IN BLOOD BY AUTOMATED COUNT: 21.8 % (ref 11.5–14.5)
EST. GFR  (NO RACE VARIABLE): >60 ML/MIN/1.73 M^2
GLUCOSE SERPL-MCNC: 139 MG/DL (ref 70–110)
HCT VFR BLD AUTO: 31.8 % (ref 40–54)
HGB BLD-MCNC: 9.8 G/DL (ref 14–18)
IMM GRANULOCYTES # BLD AUTO: 0.01 K/UL (ref 0–0.04)
IMM GRANULOCYTES NFR BLD AUTO: 0.4 % (ref 0–0.5)
LYMPHOCYTES # BLD AUTO: 0.2 K/UL (ref 1–4.8)
LYMPHOCYTES NFR BLD: 8.1 % (ref 18–48)
MCH RBC QN AUTO: 28.3 PG (ref 27–31)
MCHC RBC AUTO-ENTMCNC: 30.8 G/DL (ref 32–36)
MCV RBC AUTO: 92 FL (ref 82–98)
MONOCYTES # BLD AUTO: 0.3 K/UL (ref 0.3–1)
MONOCYTES NFR BLD: 14.3 % (ref 4–15)
NEUTROPHILS # BLD AUTO: 1.7 K/UL (ref 1.8–7.7)
NEUTROPHILS NFR BLD: 77.2 % (ref 38–73)
NRBC BLD-RTO: 0 /100 WBC
PLATELET # BLD AUTO: 127 K/UL (ref 150–450)
PLATELET BLD QL SMEAR: ABNORMAL
PMV BLD AUTO: 8.9 FL (ref 9.2–12.9)
POTASSIUM SERPL-SCNC: 3.7 MMOL/L (ref 3.5–5.1)
PROT SERPL-MCNC: 6.3 G/DL (ref 6–8.4)
RBC # BLD AUTO: 3.46 M/UL (ref 4.6–6.2)
SODIUM SERPL-SCNC: 140 MMOL/L (ref 136–145)
WBC # BLD AUTO: 2.23 K/UL (ref 3.9–12.7)

## 2022-09-14 PROCEDURE — 77014 HC CT GUIDANCE RADIATION THERAPY FLDS PLACEMENT: CPT | Mod: TC | Performed by: RADIOLOGY

## 2022-09-14 PROCEDURE — 80053 COMPREHEN METABOLIC PANEL: CPT | Performed by: INTERNAL MEDICINE

## 2022-09-14 PROCEDURE — 77014 PR  CT GUIDANCE PLACEMENT RAD THERAPY FIELDS: ICD-10-PCS | Mod: 26,,, | Performed by: RADIOLOGY

## 2022-09-14 PROCEDURE — 77386 HC IMRT, COMPLEX: CPT | Performed by: RADIOLOGY

## 2022-09-14 PROCEDURE — 77336 RADIATION PHYSICS CONSULT: CPT | Performed by: RADIOLOGY

## 2022-09-14 PROCEDURE — 36415 COLL VENOUS BLD VENIPUNCTURE: CPT | Performed by: INTERNAL MEDICINE

## 2022-09-14 PROCEDURE — 77014 PR  CT GUIDANCE PLACEMENT RAD THERAPY FIELDS: CPT | Mod: 26,,, | Performed by: RADIOLOGY

## 2022-09-14 PROCEDURE — 85025 COMPLETE CBC W/AUTO DIFF WBC: CPT | Performed by: INTERNAL MEDICINE

## 2022-09-14 NOTE — PLAN OF CARE
Day 26 of outpatient xrt to the lung. mild cough; no pain; denies SOB and sore throat. Will continue to monitor.

## 2022-09-15 ENCOUNTER — INFUSION (OUTPATIENT)
Dept: INFUSION THERAPY | Facility: HOSPITAL | Age: 87
End: 2022-09-15
Attending: INTERNAL MEDICINE
Payer: MEDICAID

## 2022-09-15 ENCOUNTER — OFFICE VISIT (OUTPATIENT)
Dept: HEMATOLOGY/ONCOLOGY | Facility: CLINIC | Age: 87
End: 2022-09-15
Payer: MEDICAID

## 2022-09-15 VITALS
SYSTOLIC BLOOD PRESSURE: 132 MMHG | WEIGHT: 95.88 LBS | DIASTOLIC BLOOD PRESSURE: 63 MMHG | OXYGEN SATURATION: 98 % | HEART RATE: 74 BPM | TEMPERATURE: 98 F | HEIGHT: 64 IN | BODY MASS INDEX: 16.37 KG/M2

## 2022-09-15 VITALS
RESPIRATION RATE: 16 BRPM | TEMPERATURE: 98 F | DIASTOLIC BLOOD PRESSURE: 53 MMHG | HEART RATE: 57 BPM | OXYGEN SATURATION: 98 % | SYSTOLIC BLOOD PRESSURE: 123 MMHG

## 2022-09-15 DIAGNOSIS — I82.619: ICD-10-CM

## 2022-09-15 DIAGNOSIS — D63.0 ANEMIA IN NEOPLASTIC DISEASE: ICD-10-CM

## 2022-09-15 DIAGNOSIS — D69.6 THROMBOCYTOPENIA: ICD-10-CM

## 2022-09-15 DIAGNOSIS — Z79.899 IMMUNODEFICIENCY DUE TO CHEMOTHERAPY: ICD-10-CM

## 2022-09-15 DIAGNOSIS — T45.1X5A CHEMOTHERAPY INDUCED NEUTROPENIA: ICD-10-CM

## 2022-09-15 DIAGNOSIS — D84.821 IMMUNODEFICIENCY DUE TO CHEMOTHERAPY: ICD-10-CM

## 2022-09-15 DIAGNOSIS — C34.91 SQUAMOUS CELL CARCINOMA OF RIGHT LUNG: Primary | ICD-10-CM

## 2022-09-15 DIAGNOSIS — D70.1 CHEMOTHERAPY INDUCED NEUTROPENIA: ICD-10-CM

## 2022-09-15 DIAGNOSIS — T45.1X5A IMMUNODEFICIENCY DUE TO CHEMOTHERAPY: ICD-10-CM

## 2022-09-15 PROCEDURE — 1159F MED LIST DOCD IN RCRD: CPT | Mod: CPTII,,, | Performed by: INTERNAL MEDICINE

## 2022-09-15 PROCEDURE — 96417 CHEMO IV INFUS EACH ADDL SEQ: CPT

## 2022-09-15 PROCEDURE — 1126F PR PAIN SEVERITY QUANTIFIED, NO PAIN PRESENT: ICD-10-PCS | Mod: CPTII,,, | Performed by: INTERNAL MEDICINE

## 2022-09-15 PROCEDURE — 1160F RVW MEDS BY RX/DR IN RCRD: CPT | Mod: CPTII,,, | Performed by: INTERNAL MEDICINE

## 2022-09-15 PROCEDURE — 99215 OFFICE O/P EST HI 40 MIN: CPT | Mod: S$PBB,,, | Performed by: INTERNAL MEDICINE

## 2022-09-15 PROCEDURE — 96375 TX/PRO/DX INJ NEW DRUG ADDON: CPT

## 2022-09-15 PROCEDURE — 77014 HC CT GUIDANCE RADIATION THERAPY FLDS PLACEMENT: CPT | Mod: TC | Performed by: RADIOLOGY

## 2022-09-15 PROCEDURE — 1160F PR REVIEW ALL MEDS BY PRESCRIBER/CLIN PHARMACIST DOCUMENTED: ICD-10-PCS | Mod: CPTII,,, | Performed by: INTERNAL MEDICINE

## 2022-09-15 PROCEDURE — 77386 HC IMRT, COMPLEX: CPT | Performed by: RADIOLOGY

## 2022-09-15 PROCEDURE — 99999 PR PBB SHADOW E&M-EST. PATIENT-LVL III: CPT | Mod: PBBFAC,,, | Performed by: INTERNAL MEDICINE

## 2022-09-15 PROCEDURE — 3288F PR FALLS RISK ASSESSMENT DOCUMENTED: ICD-10-PCS | Mod: CPTII,,, | Performed by: INTERNAL MEDICINE

## 2022-09-15 PROCEDURE — 1101F PR PT FALLS ASSESS DOC 0-1 FALLS W/OUT INJ PAST YR: ICD-10-PCS | Mod: CPTII,,, | Performed by: INTERNAL MEDICINE

## 2022-09-15 PROCEDURE — 1126F AMNT PAIN NOTED NONE PRSNT: CPT | Mod: CPTII,,, | Performed by: INTERNAL MEDICINE

## 2022-09-15 PROCEDURE — 25000003 PHARM REV CODE 250: Performed by: INTERNAL MEDICINE

## 2022-09-15 PROCEDURE — 99215 PR OFFICE/OUTPT VISIT, EST, LEVL V, 40-54 MIN: ICD-10-PCS | Mod: S$PBB,,, | Performed by: INTERNAL MEDICINE

## 2022-09-15 PROCEDURE — 1101F PT FALLS ASSESS-DOCD LE1/YR: CPT | Mod: CPTII,,, | Performed by: INTERNAL MEDICINE

## 2022-09-15 PROCEDURE — 1159F PR MEDICATION LIST DOCUMENTED IN MEDICAL RECORD: ICD-10-PCS | Mod: CPTII,,, | Performed by: INTERNAL MEDICINE

## 2022-09-15 PROCEDURE — 77014 PR  CT GUIDANCE PLACEMENT RAD THERAPY FIELDS: CPT | Mod: 26,,, | Performed by: RADIOLOGY

## 2022-09-15 PROCEDURE — 96367 TX/PROPH/DG ADDL SEQ IV INF: CPT

## 2022-09-15 PROCEDURE — 77014 PR  CT GUIDANCE PLACEMENT RAD THERAPY FIELDS: ICD-10-PCS | Mod: 26,,, | Performed by: RADIOLOGY

## 2022-09-15 PROCEDURE — 3288F FALL RISK ASSESSMENT DOCD: CPT | Mod: CPTII,,, | Performed by: INTERNAL MEDICINE

## 2022-09-15 PROCEDURE — 63600175 PHARM REV CODE 636 W HCPCS: Performed by: INTERNAL MEDICINE

## 2022-09-15 PROCEDURE — 99999 PR PBB SHADOW E&M-EST. PATIENT-LVL III: ICD-10-PCS | Mod: PBBFAC,,, | Performed by: INTERNAL MEDICINE

## 2022-09-15 PROCEDURE — 99213 OFFICE O/P EST LOW 20 MIN: CPT | Mod: PBBFAC,25 | Performed by: INTERNAL MEDICINE

## 2022-09-15 PROCEDURE — 96413 CHEMO IV INFUSION 1 HR: CPT

## 2022-09-15 RX ORDER — FAMOTIDINE 10 MG/ML
20 INJECTION INTRAVENOUS
Status: CANCELLED | OUTPATIENT
Start: 2022-09-15

## 2022-09-15 RX ORDER — FAMOTIDINE 10 MG/ML
20 INJECTION INTRAVENOUS
Status: COMPLETED | OUTPATIENT
Start: 2022-09-15 | End: 2022-09-15

## 2022-09-15 RX ORDER — EPINEPHRINE 0.3 MG/.3ML
0.3 INJECTION SUBCUTANEOUS ONCE AS NEEDED
Status: CANCELLED | OUTPATIENT
Start: 2022-09-15

## 2022-09-15 RX ORDER — DIPHENHYDRAMINE HYDROCHLORIDE 50 MG/ML
50 INJECTION INTRAMUSCULAR; INTRAVENOUS ONCE AS NEEDED
Status: CANCELLED | OUTPATIENT
Start: 2022-09-15

## 2022-09-15 RX ORDER — SODIUM CHLORIDE 0.9 % (FLUSH) 0.9 %
10 SYRINGE (ML) INJECTION
Status: CANCELLED | OUTPATIENT
Start: 2022-09-15

## 2022-09-15 RX ORDER — HEPARIN 100 UNIT/ML
500 SYRINGE INTRAVENOUS
Status: CANCELLED | OUTPATIENT
Start: 2022-09-15

## 2022-09-15 RX ADMIN — FAMOTIDINE 20 MG: 10 INJECTION INTRAVENOUS at 10:09

## 2022-09-15 RX ADMIN — CARBOPLATIN 155 MG: 10 INJECTION, SOLUTION INTRAVENOUS at 12:09

## 2022-09-15 RX ADMIN — DIPHENHYDRAMINE HYDROCHLORIDE 50 MG: 50 INJECTION, SOLUTION INTRAMUSCULAR; INTRAVENOUS at 10:09

## 2022-09-15 RX ADMIN — DEXAMETHASONE SODIUM PHOSPHATE 0.25 MG: 4 INJECTION, SOLUTION INTRA-ARTICULAR; INTRALESIONAL; INTRAMUSCULAR; INTRAVENOUS; SOFT TISSUE at 10:09

## 2022-09-15 RX ADMIN — PACLITAXEL 66 MG: 6 INJECTION, SOLUTION INTRAVENOUS at 10:09

## 2022-09-15 NOTE — PROGRESS NOTES
Subjective:      DATE OF VISIT: 9/15/22     ?  Patient ID:?Real Pagan is a 87 y.o. male.?? MR#: [id number]   ?   PRIMARY ONCOLOGIST: Dr. Paiz    ? Primary Care Providers:  Shruthi Burr MD, MD (General)     CHIEF COMPLAINT: ?Follow-up on chemoradiation?     ONCOLOGIC DIAGNOSIS: Right lung squamous cell carcinoma [id number], stage IIIB  ?   CURRENT TREATMENT:  concurrent chemoradiation with carboplatin paclitaxel    PAST TREATMENT: Biopsy  ?   ONCOLOGIC HISTORY:   5/6/22: Pt presented to PCP with complaints of unintentional weight loss, fatigue, non-productive cough, dyspnea on exertion, and shortness of breath. CXR demonstrated cavitary mass-like density seen projecting over the right hilar region.    5/24/22: PET CT demonstrated 6.2cm cavitary mass in right lung extending to right hilum and abutting pleura, avid max SUV 35, mildly enlarged right paratracheal node low level uptake max SUV 2.7, no distant metastases noted.    7/1/22: bronchoscopy - right lower lobe transbronchial and bronchial biopsies positive for squamous cell carcinoma  ?   Oncology History   Squamous cell carcinoma of right lung   7/11/2022 Initial Diagnosis    Squamous cell carcinoma of right lung     7/18/2022 Cancer Staged    Staging form: Lung, AJCC 8th Edition  - Clinical stage from 7/18/2022: Stage IIIB (cT3, cN2, cM0)       8/10/2022 -  Chemotherapy    Treatment Summary   Plan Name: OP NSCLC PACLITAXEL + CARBOPLATIN (AUC) QW + RADIATION  Treatment Goal: Curative  Status: Active  Start Date: 8/10/2022  End Date: 9/14/2022 (Planned)  Provider: Lindsay Paiz MD  Chemotherapy: CARBOplatin (PARAPLATIN) 155 mg in sodium chloride 0.9% 265.5 mL chemo infusion, 155 mg (100 % of original dose 155.6 mg), Intravenous, Clinic/HOD 1 time, 3 of 6 cycles  Dose modification:   (original dose 155.6 mg, Cycle 1),   (original dose 155.6 mg, Cycle 2),   (original dose 155.6 mg, Cycle 3)  Administration: 155 mg (8/10/2022), 155 mg  (8/17/2022), 155 mg (8/24/2022)  PACLitaxeL (TAXOL) 45 mg/m2 = 66 mg in sodium chloride 0.9% 100 mL chemo infusion, 45 mg/m2 = 66 mg, Intravenous, Clinic/HOD 1 time, 3 of 6 cycles  Administration: 66 mg (8/10/2022), 66 mg (8/17/2022), 66 mg (8/24/2022)         Cancer Staging  Right lung squamous cell carcinoma [id number], stage IIIB    HPI:  He is felt improved with improvement in appetite but no weight gain, 1 lb weight loss.  He is not been using nutritional supplement.  He continues on dexamethasone 2 mg b.i.d..  Complete resolution of left hand edema.  Dry cough stable.    A comprehensive 14-point review of systems was reviewed with patient and was negative other than as specified above.   ?   Objective:      Vitals:    09/15/22 0907   BP: 132/63   Pulse: 74   Temp: 97.6 °F (36.4 °C)      ?   ECOG:?2  General appearance: Generally well appearing, in no acute distress.   Head, eyes, ears, nose, and throat: moist mucous membranes.   Respiratory:  Normal work of breathing  Abdomen: cachectic, nontender, nondistended.   Extremities: Warm, without edema.   Neurologic: Alert and oriented. Grossly normal strength, coordination, and gait.   Skin: No rashes, ecchymoses or petechial lesion.   Psychiatric:  Normal mood and affect.    ?   Laboratory:  ?   No visits with results within 1 Day(s) from this visit.   Latest known visit with results is:   Lab Visit on 09/14/2022   Component Date Value Ref Range Status    WBC 09/14/2022 2.23 (L)  3.90 - 12.70 K/uL Final    RBC 09/14/2022 3.46 (L)  4.60 - 6.20 M/uL Final    Hemoglobin 09/14/2022 9.8 (L)  14.0 - 18.0 g/dL Final    Hematocrit 09/14/2022 31.8 (L)  40.0 - 54.0 % Final    MCV 09/14/2022 92  82 - 98 fL Final    MCH 09/14/2022 28.3  27.0 - 31.0 pg Final    MCHC 09/14/2022 30.8 (L)  32.0 - 36.0 g/dL Final    RDW 09/14/2022 21.8 (H)  11.5 - 14.5 % Final    Platelets 09/14/2022 127 (L)  150 - 450 K/uL Final    MPV 09/14/2022 8.9 (L)  9.2 - 12.9 fL Final    Immature Granulocytes  09/14/2022 0.4  0.0 - 0.5 % Final    Gran # (ANC) 09/14/2022 1.7 (L)  1.8 - 7.7 K/uL Final    Immature Grans (Abs) 09/14/2022 0.01  0.00 - 0.04 K/uL Final    Lymph # 09/14/2022 0.2 (L)  1.0 - 4.8 K/uL Final    Mono # 09/14/2022 0.3  0.3 - 1.0 K/uL Final    Eos # 09/14/2022 0.0  0.0 - 0.5 K/uL Final    Baso # 09/14/2022 0.00  0.00 - 0.20 K/uL Final    nRBC 09/14/2022 0  0 /100 WBC Final    Gran % 09/14/2022 77.2 (H)  38.0 - 73.0 % Final    Lymph % 09/14/2022 8.1 (L)  18.0 - 48.0 % Final    Mono % 09/14/2022 14.3  4.0 - 15.0 % Final    Eosinophil % 09/14/2022 0.0  0.0 - 8.0 % Final    Basophil % 09/14/2022 0.0  0.0 - 1.9 % Final    Platelet Estimate 09/14/2022 Decreased (A)   Final    Differential Method 09/14/2022 Automated   Final    Sodium 09/14/2022 140  136 - 145 mmol/L Final    Potassium 09/14/2022 3.7  3.5 - 5.1 mmol/L Final    Chloride 09/14/2022 102  95 - 110 mmol/L Final    CO2 09/14/2022 27  23 - 29 mmol/L Final    Glucose 09/14/2022 139 (H)  70 - 110 mg/dL Final    BUN 09/14/2022 14  8 - 23 mg/dL Final    Creatinine 09/14/2022 0.6  0.5 - 1.4 mg/dL Final    Calcium 09/14/2022 8.6 (L)  8.7 - 10.5 mg/dL Final    Total Protein 09/14/2022 6.3  6.0 - 8.4 g/dL Final    Albumin 09/14/2022 2.8 (L)  3.5 - 5.2 g/dL Final    Total Bilirubin 09/14/2022 0.3  0.1 - 1.0 mg/dL Final    Alkaline Phosphatase 09/14/2022 145 (H)  55 - 135 U/L Final    AST 09/14/2022 12  10 - 40 U/L Final    ALT 09/14/2022 13  10 - 44 U/L Final    Anion Gap 09/14/2022 11  8 - 16 mmol/L Final    eGFR 09/14/2022 >60  >60 mL/min/1.73 m^2 Final       Imaging:    No results found for this or any previous visit (from the past 2160 hour(s)).  Results for orders placed or performed during the hospital encounter of 07/28/22 (from the past 2160 hour(s))   MRI Brain W WO Contrast    Impression    1. No evidence of metastatic disease to the brain.  2. Mild to moderate patchy T2 FLAIR hyperintensity within the cerebral white matter likely relates to chronic  microvascular ischemia.  3. Mild generalized atrophy.  4. Changes of chronic left maxillary sinusitis.      Electronically signed by: Gerard Hannah Jr., MD  Date:    07/28/2022  Time:    23:39     No results found for this or any previous visit (from the past 2160 hour(s)).  PET CT 5/25/22:  Head/neck: Normal physiologic activity present.    Chest: 6.2 x 5.2 cm cavitary mass lesion within the right lung which extends to the right hilum and abuts the posteromedial pleura.  Lesion is FDG avid within SUV max of 35.  Adjacent atelectasis/airspace opacity present.  No other FDG avid pulmonary nodule appreciated.  Correlate with diagnostic CT chest imaging.     No hilar FDG avid lymph nodes.  No axillary FDG avid lymph nodes.  Mildly enlarged right paratracheal lymph no demonstrating low level activity maintaining fatty hilum. Subcentimeter short axis more cranial right paratracheal lymph node present demonstrating a SUV max 2.7.  Subcentimeter subcarinal lymph node with low level activity.     Bilateral gynecomastia noted.     Abdomen/pelvis: Physiologic activity without concerning FDG avid focus.     Renal cysts present.  Prostate enlargement.  Left inguinal hernia containing a portion of the sigmoid colon.  No evidence of obstruction or strangulation.     Skeletal/subcutaneous structures: No FDG avid osseous lesion.     Impression:     Large cavitary FDG avid right lung mass.  Malignancy versus infectious etiology..  Mediastinal lymph nodes as above.  Other findings as above.    Pathology:    1. AND 2. RIGHT LOWER LOBE TRANSBRONCHIAL AND BRONCHIAL BIOPSIES:   POSITIVE FOR SQUAMOUS CARCINOMA    ?   Assessment/Plan:   Squamous cell carcinoma of right lung    Acute thrombosis of cephalic vein, unspecified laterality    Immunodeficiency due to chemotherapy    Anemia in neoplastic disease    Chemotherapy induced neutropenia    Thrombocytopenia    Other orders  -     palonosetron (ALOXI) 0.25 mg with Dexamethasone (DECADRON)  12 mg in NS 50 mL IVPB  -     diphenhydrAMINE (BENADRYL) 50 mg in sodium chloride 0.9% 50 mL IVPB  -     famotidine (PF) injection 20 mg  -     PACLitaxeL (TAXOL) 45 mg/m2 = 66 mg in sodium chloride 0.9% 100 mL chemo infusion  -     CARBOplatin (PARAPLATIN) 175 mg in sodium chloride 0.9% 250 mL chemo infusion  -     EPINEPHrine (EPIPEN) 0.3 mg/0.3 mL pen injection 0.3 mg  -     diphenhydrAMINE injection 50 mg  -     hydrocortisone sodium succinate injection 100 mg  -     sodium chloride 0.9% 250 mL flush bag  -     sodium chloride 0.9% flush 10 mL  -     heparin, porcine (PF) 100 unit/mL injection flush 500 Units  -     alteplase injection 2 mg       1. Squamous cell carcinoma of right lung    2. Acute thrombosis of cephalic vein, unspecified laterality    3. Immunodeficiency due to chemotherapy    4. Anemia in neoplastic disease    5. Chemotherapy induced neutropenia    6. Thrombocytopenia            Plan:     Problem List Items Addressed This Visit          Oncology    Squamous cell carcinoma of right lung - Primary     Other Visit Diagnoses       Acute thrombosis of cephalic vein, unspecified laterality        Immunodeficiency due to chemotherapy        Anemia in neoplastic disease        Chemotherapy induced neutropenia        Thrombocytopenia                # Squamous Cell Carcinoma of Right Lung, Stage IIIB; [id number]   biopsy-proven squamous cell carcinoma transbronchial biopsy; PET with mild avidity of right paratracheal node cannot exclude disease involvement.  We did discuss consideration of bronchoscopy/EBUS for pathologic evaluation of lymph node involvement however given high risk lesion central, larger size and avidity, in discussion with Radiation Oncology colleague Dr. Vanegas preference to avoid additional procedure and delay in initiation of therapy in favor of proceeding with definitive chemoradiation including within radiation field with attempt to limit toxicity.  Tenuous functional status and  discussed with patient and family present with assistance of  diagnosis, treatment plan and risks and benefits of therapy with chemoradiation plan and close monitoring for toxicity however concurrent chemotherapy with carboplatin paclitaxel is preferred given locally advanced malignancy.    Labs with progressive neutropenia will need to hold treatment today ANC < 1.5.  Improvement in thrombocytopenia.  Proceed cautiously with anticoagulation per below.      Treatment held last week due to neutropenia.  Repeat labs with improvement.  Continued pancytopenia with improvement in platelet count, hemoglobin relatively stable.  Okay to proceed with treatment today.  Ongoing radiation plan to complete next week.  We discussed plan for maintenance immunotherapy and will add to treatment plan and will need additional consent for this portion of his treatment.    Iron deficiency anemia:  Status post IV iron therapy completed 2 doses Feraheme on 08/15/2022.  Stable anemia but will continue to trend with ongoing therapy.      LUE superficial thrombosis: 8/23/22 left upper extremity Doppler showing extensive cephalic vein complete occlusion.  No DVT.  Given extensive nature of superficial thrombosis and high risk patient for progression given hypercoagulability with ongoing malignancy lung cancer and tobacco use recommend anticoagulation.  Sent rivaroxaban 10 mg daily.  Instructed him to discontinue megestrol.  Will need close monitoring for any bleeding and for improvement.  Tolerating well, complete resolution of edema.  Will continue anticoagulation for total 45 days/ completion of chemoradiation and re-evaluate if resolved can discontinue.    Anorexia: Dronabinol not approved.  Megace prescribed by colleague, which I have held due to new thrombosis acutely in this setting.  Prescribed dexamethasone low dose for appetite stimulation which has been helpful with couple lb weight gain.  Nutrition follow-up.  Discussed  importance of having boost supplement 2 per day to maintain/increased weight.    Follow-Up:     Cycle 4 carbo/taxol today  Add immunotherapy to treatment plan will need consent for this later on; plan for repeat staging after completion of chemoradiation before proceeding with immunotherapy maintenance.  Revisit 1 week CBC, CMP, cycle 5 consideration

## 2022-09-15 NOTE — PLAN OF CARE
Problem: Adult Inpatient Plan of Care  Goal: Plan of Care Review  Outcome: Ongoing, Not Progressing  Goal: Patient-Specific Goal (Individualized)  Outcome: Ongoing, Not Progressing  Goal: Optimal Comfort and Wellbeing  Outcome: Ongoing, Not Progressing  Intervention: Provide Person-Centered Care  Flowsheets (Taken 9/15/2022 1015)  Trust Relationship/Rapport:   care explained   choices provided   emotional support provided   empathic listening provided   questions answered   questions encouraged   reassurance provided   thoughts/feelings acknowledged

## 2022-09-15 NOTE — Clinical Note
This patient as well can you please place future treatment plan for the immunotherapy durvalumab portion.  He will need additional teaching and consent in my office I can do myself.  Will need restaging scans radiation prior to this.

## 2022-09-16 PROCEDURE — 77014 PR  CT GUIDANCE PLACEMENT RAD THERAPY FIELDS: CPT | Mod: 26,,, | Performed by: RADIOLOGY

## 2022-09-16 PROCEDURE — 77014 HC CT GUIDANCE RADIATION THERAPY FLDS PLACEMENT: CPT | Mod: TC | Performed by: RADIOLOGY

## 2022-09-16 PROCEDURE — 77386 HC IMRT, COMPLEX: CPT | Performed by: RADIOLOGY

## 2022-09-16 PROCEDURE — 77014 PR  CT GUIDANCE PLACEMENT RAD THERAPY FIELDS: ICD-10-PCS | Mod: 26,,, | Performed by: RADIOLOGY

## 2022-09-19 PROCEDURE — 77014 PR  CT GUIDANCE PLACEMENT RAD THERAPY FIELDS: ICD-10-PCS | Mod: 26,,, | Performed by: RADIOLOGY

## 2022-09-19 PROCEDURE — 77386 HC IMRT, COMPLEX: CPT | Performed by: RADIOLOGY

## 2022-09-19 PROCEDURE — 77014 PR  CT GUIDANCE PLACEMENT RAD THERAPY FIELDS: CPT | Mod: 26,,, | Performed by: RADIOLOGY

## 2022-09-19 PROCEDURE — 77014 HC CT GUIDANCE RADIATION THERAPY FLDS PLACEMENT: CPT | Mod: TC | Performed by: RADIOLOGY

## 2022-09-20 ENCOUNTER — DOCUMENTATION ONLY (OUTPATIENT)
Dept: PALLIATIVE MEDICINE | Facility: CLINIC | Age: 87
End: 2022-09-20
Payer: MEDICAID

## 2022-09-20 ENCOUNTER — DOCUMENTATION ONLY (OUTPATIENT)
Dept: RADIATION ONCOLOGY | Facility: CLINIC | Age: 87
End: 2022-09-20
Payer: MEDICAID

## 2022-09-20 PROCEDURE — 77014 PR  CT GUIDANCE PLACEMENT RAD THERAPY FIELDS: CPT | Mod: 26,,, | Performed by: RADIOLOGY

## 2022-09-20 PROCEDURE — 77386 HC IMRT, COMPLEX: CPT | Performed by: RADIOLOGY

## 2022-09-20 PROCEDURE — 77014 PR  CT GUIDANCE PLACEMENT RAD THERAPY FIELDS: ICD-10-PCS | Mod: 26,,, | Performed by: RADIOLOGY

## 2022-09-20 PROCEDURE — 77014 HC CT GUIDANCE RADIATION THERAPY FLDS PLACEMENT: CPT | Mod: TC | Performed by: RADIOLOGY

## 2022-09-20 PROCEDURE — 77336 RADIATION PHYSICS CONSULT: CPT | Performed by: RADIOLOGY

## 2022-09-20 NOTE — PROGRESS NOTES
Palliative no show note    Nurse attempted to reach pt regarding his palliative apt, no answer, unable to leave message with pt, nurse called another number listed, a friend answered and stated she would contact Mr Pagan regarding his apt. No returned call as of now.

## 2022-09-21 ENCOUNTER — LAB VISIT (OUTPATIENT)
Dept: LAB | Facility: HOSPITAL | Age: 87
End: 2022-09-21
Attending: INTERNAL MEDICINE
Payer: MEDICAID

## 2022-09-21 DIAGNOSIS — C34.91 BRONCHOGENIC CANCER OF RIGHT LUNG: ICD-10-CM

## 2022-09-21 LAB
ALBUMIN SERPL BCP-MCNC: 3 G/DL (ref 3.5–5.2)
ALP SERPL-CCNC: 130 U/L (ref 55–135)
ALT SERPL W/O P-5'-P-CCNC: 15 U/L (ref 10–44)
ANION GAP SERPL CALC-SCNC: 10 MMOL/L (ref 8–16)
AST SERPL-CCNC: 13 U/L (ref 10–40)
BASOPHILS # BLD AUTO: 0 K/UL (ref 0–0.2)
BASOPHILS NFR BLD: 0 % (ref 0–1.9)
BILIRUB SERPL-MCNC: 0.5 MG/DL (ref 0.1–1)
BUN SERPL-MCNC: 16 MG/DL (ref 8–23)
CALCIUM SERPL-MCNC: 8.4 MG/DL (ref 8.7–10.5)
CHLORIDE SERPL-SCNC: 99 MMOL/L (ref 95–110)
CO2 SERPL-SCNC: 29 MMOL/L (ref 23–29)
CREAT SERPL-MCNC: 0.7 MG/DL (ref 0.5–1.4)
DIFFERENTIAL METHOD: ABNORMAL
EOSINOPHIL # BLD AUTO: 0 K/UL (ref 0–0.5)
EOSINOPHIL NFR BLD: 0 % (ref 0–8)
ERYTHROCYTE [DISTWIDTH] IN BLOOD BY AUTOMATED COUNT: 21.9 % (ref 11.5–14.5)
EST. GFR  (NO RACE VARIABLE): >60 ML/MIN/1.73 M^2
GLUCOSE SERPL-MCNC: 133 MG/DL (ref 70–110)
HCT VFR BLD AUTO: 33.8 % (ref 40–54)
HGB BLD-MCNC: 10.2 G/DL (ref 14–18)
IMM GRANULOCYTES # BLD AUTO: 0.02 K/UL (ref 0–0.04)
IMM GRANULOCYTES NFR BLD AUTO: 0.8 % (ref 0–0.5)
LYMPHOCYTES # BLD AUTO: 0.1 K/UL (ref 1–4.8)
LYMPHOCYTES NFR BLD: 4.4 % (ref 18–48)
MCH RBC QN AUTO: 28.2 PG (ref 27–31)
MCHC RBC AUTO-ENTMCNC: 30.2 G/DL (ref 32–36)
MCV RBC AUTO: 93 FL (ref 82–98)
MONOCYTES # BLD AUTO: 0.2 K/UL (ref 0.3–1)
MONOCYTES NFR BLD: 9.6 % (ref 4–15)
NEUTROPHILS # BLD AUTO: 2.1 K/UL (ref 1.8–7.7)
NEUTROPHILS NFR BLD: 85.2 % (ref 38–73)
NRBC BLD-RTO: 0 /100 WBC
PLATELET # BLD AUTO: 135 K/UL (ref 150–450)
PMV BLD AUTO: 8.3 FL (ref 9.2–12.9)
POTASSIUM SERPL-SCNC: 3.8 MMOL/L (ref 3.5–5.1)
PROT SERPL-MCNC: 6.3 G/DL (ref 6–8.4)
RBC # BLD AUTO: 3.62 M/UL (ref 4.6–6.2)
SODIUM SERPL-SCNC: 138 MMOL/L (ref 136–145)
WBC # BLD AUTO: 2.51 K/UL (ref 3.9–12.7)

## 2022-09-21 PROCEDURE — 85025 COMPLETE CBC W/AUTO DIFF WBC: CPT | Performed by: INTERNAL MEDICINE

## 2022-09-21 PROCEDURE — 36415 COLL VENOUS BLD VENIPUNCTURE: CPT | Performed by: INTERNAL MEDICINE

## 2022-09-21 PROCEDURE — 80053 COMPREHEN METABOLIC PANEL: CPT | Performed by: INTERNAL MEDICINE

## 2022-09-22 ENCOUNTER — INFUSION (OUTPATIENT)
Dept: INFUSION THERAPY | Facility: HOSPITAL | Age: 87
End: 2022-09-22
Attending: INTERNAL MEDICINE
Payer: MEDICAID

## 2022-09-22 ENCOUNTER — OFFICE VISIT (OUTPATIENT)
Dept: HEMATOLOGY/ONCOLOGY | Facility: CLINIC | Age: 87
End: 2022-09-22
Payer: MEDICAID

## 2022-09-22 VITALS
SYSTOLIC BLOOD PRESSURE: 129 MMHG | OXYGEN SATURATION: 97 % | HEART RATE: 95 BPM | WEIGHT: 96.31 LBS | TEMPERATURE: 98 F | HEIGHT: 64 IN | RESPIRATION RATE: 16 BRPM | DIASTOLIC BLOOD PRESSURE: 65 MMHG | BODY MASS INDEX: 16.44 KG/M2

## 2022-09-22 VITALS
WEIGHT: 96.31 LBS | OXYGEN SATURATION: 98 % | RESPIRATION RATE: 16 BRPM | BODY MASS INDEX: 16.44 KG/M2 | HEIGHT: 64 IN | SYSTOLIC BLOOD PRESSURE: 141 MMHG | HEART RATE: 57 BPM | DIASTOLIC BLOOD PRESSURE: 57 MMHG | TEMPERATURE: 98 F

## 2022-09-22 DIAGNOSIS — T45.1X5A IMMUNODEFICIENCY DUE TO CHEMOTHERAPY: ICD-10-CM

## 2022-09-22 DIAGNOSIS — I82.619: ICD-10-CM

## 2022-09-22 DIAGNOSIS — D84.821 IMMUNODEFICIENCY DUE TO CHEMOTHERAPY: ICD-10-CM

## 2022-09-22 DIAGNOSIS — C34.91 SQUAMOUS CELL CARCINOMA OF RIGHT LUNG: Primary | ICD-10-CM

## 2022-09-22 DIAGNOSIS — D70.1 CHEMOTHERAPY INDUCED NEUTROPENIA: ICD-10-CM

## 2022-09-22 DIAGNOSIS — D69.6 THROMBOCYTOPENIA: ICD-10-CM

## 2022-09-22 DIAGNOSIS — D63.0 ANEMIA IN NEOPLASTIC DISEASE: ICD-10-CM

## 2022-09-22 DIAGNOSIS — T45.1X5A CHEMOTHERAPY INDUCED NEUTROPENIA: ICD-10-CM

## 2022-09-22 DIAGNOSIS — Z79.899 IMMUNODEFICIENCY DUE TO CHEMOTHERAPY: ICD-10-CM

## 2022-09-22 PROCEDURE — 1126F AMNT PAIN NOTED NONE PRSNT: CPT | Mod: CPTII,,, | Performed by: INTERNAL MEDICINE

## 2022-09-22 PROCEDURE — 25000003 PHARM REV CODE 250: Performed by: INTERNAL MEDICINE

## 2022-09-22 PROCEDURE — 63600175 PHARM REV CODE 636 W HCPCS: Performed by: INTERNAL MEDICINE

## 2022-09-22 PROCEDURE — 1101F PT FALLS ASSESS-DOCD LE1/YR: CPT | Mod: CPTII,,, | Performed by: INTERNAL MEDICINE

## 2022-09-22 PROCEDURE — 99215 PR OFFICE/OUTPT VISIT, EST, LEVL V, 40-54 MIN: ICD-10-PCS | Mod: S$PBB,,, | Performed by: INTERNAL MEDICINE

## 2022-09-22 PROCEDURE — 96413 CHEMO IV INFUSION 1 HR: CPT

## 2022-09-22 PROCEDURE — 99213 OFFICE O/P EST LOW 20 MIN: CPT | Mod: PBBFAC,25 | Performed by: INTERNAL MEDICINE

## 2022-09-22 PROCEDURE — 96367 TX/PROPH/DG ADDL SEQ IV INF: CPT

## 2022-09-22 PROCEDURE — 99999 PR PBB SHADOW E&M-EST. PATIENT-LVL III: ICD-10-PCS | Mod: PBBFAC,,, | Performed by: INTERNAL MEDICINE

## 2022-09-22 PROCEDURE — 1126F PR PAIN SEVERITY QUANTIFIED, NO PAIN PRESENT: ICD-10-PCS | Mod: CPTII,,, | Performed by: INTERNAL MEDICINE

## 2022-09-22 PROCEDURE — 96375 TX/PRO/DX INJ NEW DRUG ADDON: CPT

## 2022-09-22 PROCEDURE — 99215 OFFICE O/P EST HI 40 MIN: CPT | Mod: S$PBB,,, | Performed by: INTERNAL MEDICINE

## 2022-09-22 PROCEDURE — 1101F PR PT FALLS ASSESS DOC 0-1 FALLS W/OUT INJ PAST YR: ICD-10-PCS | Mod: CPTII,,, | Performed by: INTERNAL MEDICINE

## 2022-09-22 PROCEDURE — 3288F PR FALLS RISK ASSESSMENT DOCUMENTED: ICD-10-PCS | Mod: CPTII,,, | Performed by: INTERNAL MEDICINE

## 2022-09-22 PROCEDURE — 96417 CHEMO IV INFUS EACH ADDL SEQ: CPT

## 2022-09-22 PROCEDURE — 3288F FALL RISK ASSESSMENT DOCD: CPT | Mod: CPTII,,, | Performed by: INTERNAL MEDICINE

## 2022-09-22 PROCEDURE — 99999 PR PBB SHADOW E&M-EST. PATIENT-LVL III: CPT | Mod: PBBFAC,,, | Performed by: INTERNAL MEDICINE

## 2022-09-22 RX ORDER — HEPARIN 100 UNIT/ML
500 SYRINGE INTRAVENOUS
Status: CANCELLED | OUTPATIENT
Start: 2022-09-22

## 2022-09-22 RX ORDER — DIPHENHYDRAMINE HYDROCHLORIDE 50 MG/ML
50 INJECTION INTRAMUSCULAR; INTRAVENOUS ONCE AS NEEDED
Status: CANCELLED | OUTPATIENT
Start: 2022-09-22

## 2022-09-22 RX ORDER — SODIUM CHLORIDE 0.9 % (FLUSH) 0.9 %
10 SYRINGE (ML) INJECTION
Status: DISCONTINUED | OUTPATIENT
Start: 2022-09-22 | End: 2022-09-22 | Stop reason: HOSPADM

## 2022-09-22 RX ORDER — DEXAMETHASONE 2 MG/1
TABLET ORAL
COMMUNITY
Start: 2022-09-19 | End: 2022-10-13

## 2022-09-22 RX ORDER — EPINEPHRINE 0.3 MG/.3ML
0.3 INJECTION SUBCUTANEOUS ONCE AS NEEDED
Status: CANCELLED | OUTPATIENT
Start: 2022-09-22

## 2022-09-22 RX ORDER — SODIUM CHLORIDE 0.9 % (FLUSH) 0.9 %
10 SYRINGE (ML) INJECTION
Status: CANCELLED | OUTPATIENT
Start: 2022-09-22

## 2022-09-22 RX ORDER — FAMOTIDINE 10 MG/ML
20 INJECTION INTRAVENOUS
Status: COMPLETED | OUTPATIENT
Start: 2022-09-22 | End: 2022-09-22

## 2022-09-22 RX ORDER — FAMOTIDINE 10 MG/ML
20 INJECTION INTRAVENOUS
Status: CANCELLED | OUTPATIENT
Start: 2022-09-22

## 2022-09-22 RX ADMIN — CARBOPLATIN 140 MG: 10 INJECTION, SOLUTION INTRAVENOUS at 12:09

## 2022-09-22 RX ADMIN — DIPHENHYDRAMINE HYDROCHLORIDE 50 MG: 50 INJECTION INTRAMUSCULAR; INTRAVENOUS at 11:09

## 2022-09-22 RX ADMIN — PACLITAXEL 66 MG: 6 INJECTION, SOLUTION INTRAVENOUS at 11:09

## 2022-09-22 RX ADMIN — DEXAMETHASONE SODIUM PHOSPHATE 0.25 MG: 4 INJECTION, SOLUTION INTRA-ARTICULAR; INTRALESIONAL; INTRAMUSCULAR; INTRAVENOUS; SOFT TISSUE at 11:09

## 2022-09-22 RX ADMIN — FAMOTIDINE 20 MG: 10 INJECTION INTRAVENOUS at 11:09

## 2022-09-22 NOTE — PROGRESS NOTES
Subjective:      DATE OF VISIT: 9/22/22     ?  Patient ID:?Real Pagan is a 87 y.o. male.?? MR#: [id number]   ?   PRIMARY ONCOLOGIST: Dr. Paiz    ? Primary Care Providers:  Shruthi Burr MD, MD (General)     CHIEF COMPLAINT: ?Follow-up on chemoradiation?     ONCOLOGIC DIAGNOSIS: Right lung squamous cell carcinoma [id number], stage IIIB  ?   CURRENT TREATMENT:  concurrent chemoradiation with carboplatin paclitaxel    PAST TREATMENT: Biopsy  ?   ONCOLOGIC HISTORY:   5/6/22: Pt presented to PCP with complaints of unintentional weight loss, fatigue, non-productive cough, dyspnea on exertion, and shortness of breath. CXR demonstrated cavitary mass-like density seen projecting over the right hilar region.    5/24/22: PET CT demonstrated 6.2cm cavitary mass in right lung extending to right hilum and abutting pleura, avid max SUV 35, mildly enlarged right paratracheal node low level uptake max SUV 2.7, no distant metastases noted.    7/1/22: bronchoscopy - right lower lobe transbronchial and bronchial biopsies positive for squamous cell carcinoma  ?   Oncology History   Squamous cell carcinoma of right lung   7/11/2022 Initial Diagnosis    Squamous cell carcinoma of right lung     7/18/2022 Cancer Staged    Staging form: Lung, AJCC 8th Edition  - Clinical stage from 7/18/2022: Stage IIIB (cT3, cN2, cM0)       8/9/2022 - 9/20/2022 Radiation Therapy    Treating physician: Romina  Total Dose: 60 Gy  Fractions: 30     8/10/2022 -  Chemotherapy    Treatment Summary   Plan Name: OP NSCLC PACLITAXEL + CARBOPLATIN (AUC) QW + RADIATION  Treatment Goal: Curative  Status: Active  Start Date: 8/10/2022  End Date: 9/29/2022 (Planned)  Provider: Lindsay Paiz MD  Chemotherapy: CARBOplatin (PARAPLATIN) 155 mg in sodium chloride 0.9% 265.5 mL chemo infusion, 155 mg (100 % of original dose 155.6 mg), Intravenous, Clinic/HOD 1 time, 5 of 6 cycles  Dose modification:   (original dose 155.6 mg, Cycle 1),   (original  dose 155.6 mg, Cycle 2),   (original dose 155.6 mg, Cycle 3)  Administration: 155 mg (8/10/2022), 155 mg (8/17/2022), 155 mg (8/24/2022), 155 mg (9/15/2022)  PACLitaxeL (TAXOL) 45 mg/m2 = 66 mg in sodium chloride 0.9% 100 mL chemo infusion, 45 mg/m2 = 66 mg, Intravenous, Clinic/HOD 1 time, 5 of 6 cycles  Administration: 66 mg (8/10/2022), 66 mg (8/17/2022), 66 mg (8/24/2022), 66 mg (9/15/2022)       9/30/2022 -  Chemotherapy    Treatment Summary   Plan Name: OP DURVALUMAB 1500 MG Q4W  Treatment Goal: Curative  Status: Future  Start Date: 9/30/2022 (Planned)  End Date: 9/1/2023 (Planned)  Provider: Lindsay Paiz MD  Chemotherapy: [No matching medication found in this treatment plan]         Cancer Staging  Right lung squamous cell carcinoma [id number], stage IIIB    HPI:  Some continued improved appetite and supplementation use 2 boost per day. No weight loss. In good spirits.     A comprehensive 14-point review of systems was reviewed with patient and was negative other than as specified above.   ?   Objective:      Vitals:    09/22/22 0952   BP: 129/65   Pulse: 95   Resp: 16   Temp: 98.1 °F (36.7 °C)      ?   ECOG:?2  General appearance: Generally well appearing, in no acute distress.   Head, eyes, ears, nose, and throat: moist mucous membranes.   Respiratory:  Normal work of breathing  Abdomen: cachectic, nontender, nondistended.   Extremities: Warm, without edema.   Neurologic: Alert and oriented. Grossly normal strength, coordination, and gait.   Skin: No rashes, ecchymoses or petechial lesion.   Psychiatric:  Normal mood and affect.    ?   Laboratory:  ?   No visits with results within 1 Day(s) from this visit.   Latest known visit with results is:   Lab Visit on 09/21/2022   Component Date Value Ref Range Status    WBC 09/21/2022 2.51 (L)  3.90 - 12.70 K/uL Final    RBC 09/21/2022 3.62 (L)  4.60 - 6.20 M/uL Final    Hemoglobin 09/21/2022 10.2 (L)  14.0 - 18.0 g/dL Final    Hematocrit 09/21/2022 33.8 (L)   40.0 - 54.0 % Final    MCV 09/21/2022 93  82 - 98 fL Final    MCH 09/21/2022 28.2  27.0 - 31.0 pg Final    MCHC 09/21/2022 30.2 (L)  32.0 - 36.0 g/dL Final    RDW 09/21/2022 21.9 (H)  11.5 - 14.5 % Final    Platelets 09/21/2022 135 (L)  150 - 450 K/uL Final    MPV 09/21/2022 8.3 (L)  9.2 - 12.9 fL Final    Immature Granulocytes 09/21/2022 0.8 (H)  0.0 - 0.5 % Final    Gran # (ANC) 09/21/2022 2.1  1.8 - 7.7 K/uL Final    Immature Grans (Abs) 09/21/2022 0.02  0.00 - 0.04 K/uL Final    Lymph # 09/21/2022 0.1 (L)  1.0 - 4.8 K/uL Final    Mono # 09/21/2022 0.2 (L)  0.3 - 1.0 K/uL Final    Eos # 09/21/2022 0.0  0.0 - 0.5 K/uL Final    Baso # 09/21/2022 0.00  0.00 - 0.20 K/uL Final    nRBC 09/21/2022 0  0 /100 WBC Final    Gran % 09/21/2022 85.2 (H)  38.0 - 73.0 % Final    Lymph % 09/21/2022 4.4 (L)  18.0 - 48.0 % Final    Mono % 09/21/2022 9.6  4.0 - 15.0 % Final    Eosinophil % 09/21/2022 0.0  0.0 - 8.0 % Final    Basophil % 09/21/2022 0.0  0.0 - 1.9 % Final    Differential Method 09/21/2022 Automated   Final    Sodium 09/21/2022 138  136 - 145 mmol/L Final    Potassium 09/21/2022 3.8  3.5 - 5.1 mmol/L Final    Chloride 09/21/2022 99  95 - 110 mmol/L Final    CO2 09/21/2022 29  23 - 29 mmol/L Final    Glucose 09/21/2022 133 (H)  70 - 110 mg/dL Final    BUN 09/21/2022 16  8 - 23 mg/dL Final    Creatinine 09/21/2022 0.7  0.5 - 1.4 mg/dL Final    Calcium 09/21/2022 8.4 (L)  8.7 - 10.5 mg/dL Final    Total Protein 09/21/2022 6.3  6.0 - 8.4 g/dL Final    Albumin 09/21/2022 3.0 (L)  3.5 - 5.2 g/dL Final    Total Bilirubin 09/21/2022 0.5  0.1 - 1.0 mg/dL Final    Alkaline Phosphatase 09/21/2022 130  55 - 135 U/L Final    AST 09/21/2022 13  10 - 40 U/L Final    ALT 09/21/2022 15  10 - 44 U/L Final    Anion Gap 09/21/2022 10  8 - 16 mmol/L Final    eGFR 09/21/2022 >60  >60 mL/min/1.73 m^2 Final       Imaging:    No results found for this or any previous visit (from the past 2160 hour(s)).  Results for orders placed or performed  during the hospital encounter of 07/28/22 (from the past 2160 hour(s))   MRI Brain W WO Contrast    Impression    1. No evidence of metastatic disease to the brain.  2. Mild to moderate patchy T2 FLAIR hyperintensity within the cerebral white matter likely relates to chronic microvascular ischemia.  3. Mild generalized atrophy.  4. Changes of chronic left maxillary sinusitis.      Electronically signed by: Gerard Hannah Jr., MD  Date:    07/28/2022  Time:    23:39     No results found for this or any previous visit (from the past 2160 hour(s)).  PET CT 5/25/22:  Head/neck: Normal physiologic activity present.    Chest: 6.2 x 5.2 cm cavitary mass lesion within the right lung which extends to the right hilum and abuts the posteromedial pleura.  Lesion is FDG avid within SUV max of 35.  Adjacent atelectasis/airspace opacity present.  No other FDG avid pulmonary nodule appreciated.  Correlate with diagnostic CT chest imaging.     No hilar FDG avid lymph nodes.  No axillary FDG avid lymph nodes.  Mildly enlarged right paratracheal lymph no demonstrating low level activity maintaining fatty hilum. Subcentimeter short axis more cranial right paratracheal lymph node present demonstrating a SUV max 2.7.  Subcentimeter subcarinal lymph node with low level activity.     Bilateral gynecomastia noted.     Abdomen/pelvis: Physiologic activity without concerning FDG avid focus.     Renal cysts present.  Prostate enlargement.  Left inguinal hernia containing a portion of the sigmoid colon.  No evidence of obstruction or strangulation.     Skeletal/subcutaneous structures: No FDG avid osseous lesion.     Impression:     Large cavitary FDG avid right lung mass.  Malignancy versus infectious etiology..  Mediastinal lymph nodes as above.  Other findings as above.    Pathology:    1. AND 2. RIGHT LOWER LOBE TRANSBRONCHIAL AND BRONCHIAL BIOPSIES:   POSITIVE FOR SQUAMOUS CARCINOMA    ?   Assessment/Plan:   Squamous cell carcinoma of right  lung  -     Cancel: palonosetron (ALOXI) 0.25 mg with Dexamethasone (DECADRON) 12 mg in NS 50 mL IVPB  -     Cancel: diphenhydrAMINE (BENADRYL) 50 mg in sodium chloride 0.9% 50 mL IVPB  -     Cancel: famotidine (PF) injection 20 mg  -     Cancel: PACLitaxeL (TAXOL) 45 mg/m2 = 66 mg in sodium chloride 0.9% 100 mL chemo infusion  -     Cancel: CARBOplatin (PARAPLATIN) 140 mg in sodium chloride 0.9% 250 mL chemo infusion  -     Cancel: sodium chloride 0.9% 250 mL flush bag  -     Cancel: sodium chloride 0.9% flush 10 mL    Acute thrombosis of cephalic vein, unspecified laterality    Immunodeficiency due to chemotherapy    Anemia in neoplastic disease    Chemotherapy induced neutropenia    Thrombocytopenia    Other orders  -     EPINEPHrine (EPIPEN) 0.3 mg/0.3 mL pen injection 0.3 mg  -     diphenhydrAMINE injection 50 mg  -     hydrocortisone sodium succinate injection 100 mg  -     heparin, porcine (PF) 100 unit/mL injection flush 500 Units  -     alteplase injection 2 mg         1. Squamous cell carcinoma of right lung    2. Acute thrombosis of cephalic vein, unspecified laterality    3. Immunodeficiency due to chemotherapy    4. Anemia in neoplastic disease    5. Chemotherapy induced neutropenia    6. Thrombocytopenia              Plan:     Problem List Items Addressed This Visit          Oncology    Squamous cell carcinoma of right lung - Primary     Other Visit Diagnoses       Acute thrombosis of cephalic vein, unspecified laterality        Immunodeficiency due to chemotherapy        Anemia in neoplastic disease        Chemotherapy induced neutropenia        Thrombocytopenia                  # Squamous Cell Carcinoma of Right Lung, Stage IIIB; [id number]   biopsy-proven squamous cell carcinoma transbronchial biopsy; PET with mild avidity of right paratracheal node cannot exclude disease involvement.  We did discuss consideration of bronchoscopy/EBUS for pathologic evaluation of lymph node involvement however given  high risk lesion central, larger size and avidity, in discussion with Radiation Oncology colleague Dr. Vanegas preference to avoid additional procedure and delay in initiation of therapy in favor of proceeding with definitive chemoradiation including within radiation field with attempt to limit toxicity.  Tenuous functional status and discussed with patient and family present with assistance of  diagnosis, treatment plan and risks and benefits of therapy with chemoradiation plan and close monitoring for toxicity however concurrent chemotherapy with carboplatin paclitaxel is preferred given locally advanced malignancy.    Labs with progressive neutropenia will need to hold treatment today ANC < 1.5.  Improvement in thrombocytopenia.  Proceed cautiously with anticoagulation per below.      Stable pancytopenia ANC 2.1, okay to proceed with final chemotherapy weekly today will complete radiation after 3 more days    Iron deficiency anemia:  Status post IV iron therapy completed 2 doses Feraheme on 08/15/2022.  Stable anemia but will continue to trend with ongoing therapy.      LUE superficial thrombosis: 8/23/22 left upper extremity Doppler showing extensive cephalic vein complete occlusion.  No DVT.  Given extensive nature of superficial thrombosis and high risk patient for progression given hypercoagulability with ongoing malignancy lung cancer and tobacco use recommend anticoagulation.  Sent rivaroxaban 10 mg daily.  Instructed him to discontinue megestrol.  Will need close monitoring for any bleeding and for improvement.  Tolerating well, complete resolution of edema.  Will continue anticoagulation for total 45 days/ completion of chemoradiation and re-evaluate if resolved can discontinue.    Anorexia: Dronabinol not approved.  Megace prescribed by colleague, which I have held due to new thrombosis acutely in this setting.  Prescribed dexamethasone low dose for appetite stimulation which has been helpful  with couple lb weight gain.  Nutrition follow-up.  Discussed importance of having boost supplement 2 per day to maintain/increased weight.    Follow-Up:     Cycle 5 carbo/taxol today  Added immunotherapy to treatment plan will need consent for this later on; plan for repeat staging after completion of chemoradiation before proceeding with immunotherapy maintenance.  Revisit 1 week CBC, CMP  Will get restaging Ct 2 wks after completion of tx, ordered and fup with him afterwards

## 2022-09-22 NOTE — PLAN OF CARE
Problem: Adult Inpatient Plan of Care  Goal: Plan of Care Review  Outcome: Ongoing, Progressing  Goal: Patient-Specific Goal (Individualized)  Outcome: Ongoing, Progressing  Goal: Optimal Comfort and Wellbeing  Outcome: Ongoing, Progressing  Intervention: Provide Person-Centered Care  Flowsheets (Taken 9/22/2022 2123)  Trust Relationship/Rapport:   care explained   reassurance provided   choices provided   thoughts/feelings acknowledged   emotional support provided   empathic listening provided   questions answered   questions encouraged

## 2022-09-22 NOTE — Clinical Note
Would like to get ct within a month so I know if good to start durvalumab maintenance. Will you be ordering or I?

## 2022-09-23 ENCOUNTER — TELEPHONE (OUTPATIENT)
Dept: HEMATOLOGY/ONCOLOGY | Facility: CLINIC | Age: 87
End: 2022-09-23
Payer: MEDICAID

## 2022-09-23 NOTE — NURSING
1650pm: Outgoing call to pt regarding new chemo appts. Spoke with pt friend Bhavya. Pt scheduled for CT scan on 10/11 at 830 am at , for new drug teaching on 10/11 at 10 am at , for labs on 10/13 at 810 am at , for Dr. Paiz visit on 10/13 at 9 am at , and for Imfinzi infusion on 10/13 at 10 am at [facility]. Pt friend Bhavya verbalized understanding. Pt plan to report to appts as scheduled.   Oncology Navigation   Intake  Cancer Type: Thoracic  Initial Nurse Navigator Contact: 22  Date Worked: 22  Multiple appointments: Yes  Start of Treatment: 10/13/22  Reason for Treatment Delay: Other (awaiting xrt SIM and start date)     Treatment  Current Status: Active       Medical Oncologist: ileana  Chemotherapy: Planned  Chemotherapy Regimen: Taxol Carboplatin weekly with concurrent xrt  Immunotherapy: Planned  Immunotherapy Name: Imfinzi  Start Date: 10/13/22    Radiation Oncologist: edward DEE 22    Procedures: CT  CT Schedule Date: 10/11/22 (C/A/P)    General Referrals: Social work  Social Work Referral Date: 22       Radiation Oncologist: edward DEE 22    Support Systems: Family members  Concerns: Guyanese speaking pt refuses / family members accompany pt     Acuity  Stage: 2  Systemic Treatment - predicted or initiated: More than one treatment modality concurrently (chemotherapy, radiation, etc.) (+2)  Treatment Tolerability: Has not started treatment yet/treatment fully completed and side effects resolved  ECO  Comorbidities in Medical History: 2   Needed: 1  Support: 0  Transportation: 0  Verbalizes the need for more education: 1  Navigation Acuity: 11     Follow Up  No follow-ups on file.

## 2022-09-27 ENCOUNTER — TELEPHONE (OUTPATIENT)
Dept: HEMATOLOGY/ONCOLOGY | Facility: CLINIC | Age: 87
End: 2022-09-27
Payer: MEDICAID

## 2022-09-27 NOTE — NURSING
1554pm: Outgoing call to pt regarding cancelled appts. Spoke with pt friend, Bhavya. Explained that per Dr. Paiz pt has had 5 cycles of carboplatin taxol with radiation and will start on Imfinzi in a couple of weeks as discussed in  visit so upcoming appts on  and  needs to be cancelled. Bhavya verbalized understanding. Pt knows not to come this week and to report to appts on 10/11 and 10/13.  Oncology Navigation   Intake  Cancer Type: Thoracic  Initial Nurse Navigator Contact: 22  Date Worked: 22  Multiple appointments: Yes  Start of Treatment: 10/13/22  Reason for Treatment Delay: Other (awaiting xrt SIM and start date)     Treatment  Current Status: Active       Medical Oncologist: ileana  Chemotherapy: Planned  Chemotherapy Regimen: Taxol Carboplatin weekly with concurrent xrt  Immunotherapy: Planned  Immunotherapy Name: Imfinzi  Start Date: 10/13/22    Radiation Oncologist: edward DEE 22    Procedures: CT  CT Schedule Date: 10/11/22 (C/A/P)    General Referrals: Social work  Social Work Referral Date: 22       Radiation Oncologist: edward DEE 22    Support Systems: Family members  Concerns: Turkmen speaking pt refuses / family members accompany pt     Acuity  Stage: 2  Systemic Treatment - predicted or initiated: More than one treatment modality concurrently (chemotherapy, radiation, etc.) (+2)  Treatment Tolerability: Has not started treatment yet/treatment fully completed and side effects resolved  ECO  Comorbidities in Medical History: 2   Needed: 1  Support: 0  Transportation: 0  Verbalizes the need for more education: 1  Navigation Acuity: 11     Follow Up  No follow-ups on file.

## 2022-10-11 ENCOUNTER — HOSPITAL ENCOUNTER (OUTPATIENT)
Dept: RADIOLOGY | Facility: HOSPITAL | Age: 87
Discharge: HOME | End: 2022-10-11
Attending: INTERNAL MEDICINE
Payer: MEDICAID

## 2022-10-11 ENCOUNTER — TELEPHONE (OUTPATIENT)
Dept: INFUSION THERAPY | Facility: HOSPITAL | Age: 87
End: 2022-10-11
Payer: MEDICAID

## 2022-10-11 ENCOUNTER — DOCUMENTATION ONLY (OUTPATIENT)
Dept: INFUSION THERAPY | Facility: HOSPITAL | Age: 87
End: 2022-10-11
Payer: MEDICAID

## 2022-10-11 DIAGNOSIS — C34.91 SQUAMOUS CELL CARCINOMA OF RIGHT LUNG: ICD-10-CM

## 2022-10-11 PROCEDURE — 74177 CT ABD & PELVIS W/CONTRAST: CPT | Mod: TC

## 2022-10-11 PROCEDURE — 74177 CT ABD & PELVIS W/CONTRAST: CPT | Mod: 26,,, | Performed by: RADIOLOGY

## 2022-10-11 PROCEDURE — 71260 CT THORAX DX C+: CPT | Mod: TC

## 2022-10-11 PROCEDURE — 25500020 PHARM REV CODE 255: Performed by: INTERNAL MEDICINE

## 2022-10-11 PROCEDURE — 71260 CT THORAX DX C+: CPT | Mod: 26,,, | Performed by: RADIOLOGY

## 2022-10-11 PROCEDURE — 74177 CT CHEST ABDOMEN PELVIS WITH CONTRAST (XPD): ICD-10-PCS | Mod: 26,,, | Performed by: RADIOLOGY

## 2022-10-11 PROCEDURE — 71260 CT CHEST ABDOMEN PELVIS WITH CONTRAST (XPD): ICD-10-PCS | Mod: 26,,, | Performed by: RADIOLOGY

## 2022-10-11 RX ADMIN — IOHEXOL 100 ML: 350 INJECTION, SOLUTION INTRAVENOUS at 08:10

## 2022-10-11 RX ADMIN — IOHEXOL 15 ML: 350 INJECTION, SOLUTION INTRAVENOUS at 08:10

## 2022-10-11 NOTE — PROGRESS NOTES
Pt checked in this am for his CT scan. He was told by registration to return to check in for his chemo teaching milan't when scan completed.  He never checked back in.  I waited a while to call for him in case CT was running late or had difficulty obtaining IV access.  I called one of the numbers in his chart, (preferred number), and was hung up on immediately after someone answered.  I called a second number and reached the person who usually comes with him to translate.  She does not know where he is.  She did not come with him today as she is sick herself.  I called CT to see if there were any issues of he was able to indicate that he was not planning on staying for next milan't.  Per CT, there were no problems.  They do not know anything further.  I notified Kathy Mackey RN, Nurse Navigator of pt no show for chemo teaching.  We will keep trying to reach him.

## 2022-10-13 ENCOUNTER — INFUSION (OUTPATIENT)
Dept: INFUSION THERAPY | Facility: HOSPITAL | Age: 87
End: 2022-10-13
Attending: INTERNAL MEDICINE
Payer: MEDICAID

## 2022-10-13 ENCOUNTER — OFFICE VISIT (OUTPATIENT)
Dept: HEMATOLOGY/ONCOLOGY | Facility: CLINIC | Age: 87
End: 2022-10-13
Payer: MEDICAID

## 2022-10-13 VITALS
TEMPERATURE: 97 F | WEIGHT: 99.44 LBS | HEART RATE: 60 BPM | BODY MASS INDEX: 16.98 KG/M2 | DIASTOLIC BLOOD PRESSURE: 56 MMHG | SYSTOLIC BLOOD PRESSURE: 125 MMHG | HEIGHT: 64 IN | OXYGEN SATURATION: 99 %

## 2022-10-13 DIAGNOSIS — C34.91 SQUAMOUS CELL CARCINOMA OF RIGHT LUNG: ICD-10-CM

## 2022-10-13 DIAGNOSIS — D69.6 THROMBOCYTOPENIA: ICD-10-CM

## 2022-10-13 DIAGNOSIS — E87.6 HYPOKALEMIA: Primary | ICD-10-CM

## 2022-10-13 DIAGNOSIS — D63.0 ANEMIA IN NEOPLASTIC DISEASE: ICD-10-CM

## 2022-10-13 DIAGNOSIS — T45.1X5A CHEMOTHERAPY INDUCED NEUTROPENIA: ICD-10-CM

## 2022-10-13 DIAGNOSIS — D70.1 CHEMOTHERAPY INDUCED NEUTROPENIA: ICD-10-CM

## 2022-10-13 PROCEDURE — 99213 OFFICE O/P EST LOW 20 MIN: CPT | Mod: PBBFAC | Performed by: INTERNAL MEDICINE

## 2022-10-13 PROCEDURE — 1101F PT FALLS ASSESS-DOCD LE1/YR: CPT | Mod: CPTII,,, | Performed by: INTERNAL MEDICINE

## 2022-10-13 PROCEDURE — 99215 OFFICE O/P EST HI 40 MIN: CPT | Mod: S$PBB,,, | Performed by: INTERNAL MEDICINE

## 2022-10-13 PROCEDURE — 1126F AMNT PAIN NOTED NONE PRSNT: CPT | Mod: CPTII,,, | Performed by: INTERNAL MEDICINE

## 2022-10-13 PROCEDURE — 1160F RVW MEDS BY RX/DR IN RCRD: CPT | Mod: CPTII,,, | Performed by: INTERNAL MEDICINE

## 2022-10-13 PROCEDURE — 1159F PR MEDICATION LIST DOCUMENTED IN MEDICAL RECORD: ICD-10-PCS | Mod: CPTII,,, | Performed by: INTERNAL MEDICINE

## 2022-10-13 PROCEDURE — 99999 PR PBB SHADOW E&M-EST. PATIENT-LVL III: CPT | Mod: PBBFAC,,, | Performed by: INTERNAL MEDICINE

## 2022-10-13 PROCEDURE — 99999 PR PBB SHADOW E&M-EST. PATIENT-LVL III: ICD-10-PCS | Mod: PBBFAC,,, | Performed by: INTERNAL MEDICINE

## 2022-10-13 PROCEDURE — 1101F PR PT FALLS ASSESS DOC 0-1 FALLS W/OUT INJ PAST YR: ICD-10-PCS | Mod: CPTII,,, | Performed by: INTERNAL MEDICINE

## 2022-10-13 PROCEDURE — 3288F FALL RISK ASSESSMENT DOCD: CPT | Mod: CPTII,,, | Performed by: INTERNAL MEDICINE

## 2022-10-13 PROCEDURE — 1126F PR PAIN SEVERITY QUANTIFIED, NO PAIN PRESENT: ICD-10-PCS | Mod: CPTII,,, | Performed by: INTERNAL MEDICINE

## 2022-10-13 PROCEDURE — 1160F PR REVIEW ALL MEDS BY PRESCRIBER/CLIN PHARMACIST DOCUMENTED: ICD-10-PCS | Mod: CPTII,,, | Performed by: INTERNAL MEDICINE

## 2022-10-13 PROCEDURE — 3288F PR FALLS RISK ASSESSMENT DOCUMENTED: ICD-10-PCS | Mod: CPTII,,, | Performed by: INTERNAL MEDICINE

## 2022-10-13 PROCEDURE — 1159F MED LIST DOCD IN RCRD: CPT | Mod: CPTII,,, | Performed by: INTERNAL MEDICINE

## 2022-10-13 PROCEDURE — 99215 PR OFFICE/OUTPT VISIT, EST, LEVL V, 40-54 MIN: ICD-10-PCS | Mod: S$PBB,,, | Performed by: INTERNAL MEDICINE

## 2022-10-13 RX ORDER — POTASSIUM CHLORIDE 20 MEQ/1
20 TABLET, EXTENDED RELEASE ORAL DAILY
Qty: 7 TABLET | Refills: 1 | Status: SHIPPED | OUTPATIENT
Start: 2022-10-13 | End: 2022-10-20

## 2022-10-13 RX ORDER — SODIUM CHLORIDE 0.9 % (FLUSH) 0.9 %
10 SYRINGE (ML) INJECTION
Status: CANCELLED | OUTPATIENT
Start: 2022-11-03

## 2022-10-13 RX ORDER — DIPHENHYDRAMINE HYDROCHLORIDE 50 MG/ML
50 INJECTION INTRAMUSCULAR; INTRAVENOUS ONCE AS NEEDED
Status: CANCELLED | OUTPATIENT
Start: 2022-11-03

## 2022-10-13 RX ORDER — HEPARIN 100 UNIT/ML
500 SYRINGE INTRAVENOUS
Status: CANCELLED | OUTPATIENT
Start: 2022-11-03

## 2022-10-13 RX ORDER — EPINEPHRINE 0.3 MG/.3ML
0.3 INJECTION SUBCUTANEOUS ONCE AS NEEDED
Status: CANCELLED | OUTPATIENT
Start: 2022-11-03

## 2022-10-13 NOTE — Clinical Note
I think you may have already set him up for follow-up in 1 week when we are discussing with johnny tracey CBC CMP prior and follow-up with me consideration of cycle 1 durvalumab.   Also can you please call patient to let him know potassium is low and I would recommend he take potassium supplementation 20 mEq daily sent to pharmacy

## 2022-10-13 NOTE — DISCHARGE INSTRUCTIONS
THANKS FOR ALLOWING ME TO CARE FOR YOU TODAY!!!! ~Susie        THANKS FOR CHOOSING [facility]!!!      [facility]  [address] [facility]  [id number] [place] [address] Drive  [phone] phone     [phone] fax  Hours of Operation: Monday- Friday 8:00am- 5:00pm  After hours phone  [phone]  Hematology / Oncology Physicians on call      [address] NP Sydney Prescott, DIANNE Veliz, VIC Mancuso    Please call with any concerns regarding your appointment today.

## 2022-10-13 NOTE — NURSING
Lab results noted; treatment held and rescheduled to next Thursday (10/20); language line  utilized to review above. Patient and family member verbalized understanding. AVS printed and reviewed with family member and patient.

## 2022-10-13 NOTE — PROGRESS NOTES
Subjective:      DATE OF VISIT: 10/13/22     ?  Patient ID:?Real Pagan is a 87 y.o. male.?? MR#: [id number]   ?   PRIMARY ONCOLOGIST: Dr. Paiz    ? Primary Care Providers:  Shruthi Burr MD, MD (General)     CHIEF COMPLAINT: ?Follow-up on chemoradiation?     ONCOLOGIC DIAGNOSIS: Right lung squamous cell carcinoma [id number], stage IIIB  ?   CURRENT TREATMENT:  concurrent chemoradiation with carboplatin paclitaxel    PAST TREATMENT: Biopsy  ?   ONCOLOGIC HISTORY:   5/6/22: Pt presented to PCP with complaints of unintentional weight loss, fatigue, non-productive cough, dyspnea on exertion, and shortness of breath. CXR demonstrated cavitary mass-like density seen projecting over the right hilar region.    5/24/22: PET CT demonstrated 6.2cm cavitary mass in right lung extending to right hilum and abutting pleura, avid max SUV 35, mildly enlarged right paratracheal node low level uptake max SUV 2.7, no distant metastases noted.    7/1/22: bronchoscopy - right lower lobe transbronchial and bronchial biopsies positive for squamous cell carcinoma  ?   Oncology History   Squamous cell carcinoma of right lung   7/11/2022 Initial Diagnosis    Squamous cell carcinoma of right lung     7/18/2022 Cancer Staged    Staging form: Lung, AJCC 8th Edition  - Clinical stage from 7/18/2022: Stage IIIB (cT3, cN2, cM0)       8/9/2022 - 9/20/2022 Radiation Therapy    Treating physician: Romina  Total Dose: 60 Gy  Fractions: 30     8/10/2022 - 9/22/2022 Chemotherapy    Treatment Summary   Plan Name: OP NSCLC PACLITAXEL + CARBOPLATIN (AUC) QW + RADIATION  Treatment Goal: Curative  Status: Inactive  Start Date: 8/10/2022  End Date: 9/22/2022  Provider: Lindsay Paiz MD  Chemotherapy: CARBOplatin (PARAPLATIN) 155 mg in sodium chloride 0.9% 265.5 mL chemo infusion, 155 mg (100 % of original dose 155.6 mg), Intravenous, Clinic/HOD 1 time, 5 of 6 cycles  Dose modification:   (original dose 155.6 mg, Cycle 1),    (original dose 155.6 mg, Cycle 2),   (original dose 155.6 mg, Cycle 3)  Administration: 155 mg (8/10/2022), 155 mg (8/17/2022), 155 mg (8/24/2022), 140 mg (9/22/2022), 155 mg (9/15/2022)  PACLitaxeL (TAXOL) 45 mg/m2 = 66 mg in sodium chloride 0.9% 100 mL chemo infusion, 45 mg/m2 = 66 mg, Intravenous, Clinic/HOD 1 time, 5 of 6 cycles  Administration: 66 mg (8/10/2022), 66 mg (8/17/2022), 66 mg (8/24/2022), 66 mg (9/15/2022), 66 mg (9/22/2022)       10/13/2022 -  Chemotherapy    Treatment Summary   Plan Name: OP DURVALUMAB 1500 MG Q4W  Treatment Goal: Curative  Status: Active  Start Date: 10/13/2022 (Planned)  End Date: 9/14/2023 (Planned)  Provider: Lindsay Paiz MD  Chemotherapy: [No matching medication found in this treatment plan]         Cancer Staging  Right lung squamous cell carcinoma [id number], stage IIIB    HPI:  Some continued improved appetite and supplementation use 2 boost per day. No weight loss. In good spirits.     A comprehensive 14-point review of systems was reviewed with patient and was negative other than as specified above.   ?   Objective:      Vitals:    10/13/22 0840   BP: (!) 125/56   Pulse: 60   Temp: 97.1 °F (36.2 °C)      ?   ECOG:?2  General appearance: Generally well appearing, in no acute distress.   Head, eyes, ears, nose, and throat: moist mucous membranes.   Respiratory:  Normal work of breathing  Abdomen: cachectic, nontender, nondistended.   Extremities: Warm, without edema.   Neurologic: Alert and oriented. Grossly normal strength, coordination, and gait.   Skin: No rashes, ecchymoses or petechial lesion.   Psychiatric:  Normal mood and affect.    ?   Laboratory:  ?   Lab Visit on 10/13/2022   Component Date Value Ref Range Status    WBC 10/13/2022 2.62 (L)  3.90 - 12.70 K/uL Corrected    RBC 10/13/2022 3.24 (L)  4.60 - 6.20 M/uL Final    Hemoglobin 10/13/2022 10.2 (L)  14.0 - 18.0 g/dL Final    Hematocrit 10/13/2022 31.3 (L)  40.0 - 54.0 % Final    MCV 10/13/2022 97  82 - 98  fL Corrected    MCH 10/13/2022 31.5 (H)  27.0 - 31.0 pg Final    MCHC 10/13/2022 32.6  32.0 - 36.0 g/dL Final    RDW 10/13/2022 22.7 (H)  11.5 - 14.5 % Final    Platelets 10/13/2022 49 (L)  150 - 450 K/uL Corrected    MPV 10/13/2022 9.4  9.2 - 12.9 fL Final    Immature Granulocytes 10/13/2022 0.4  0.0 - 0.5 % Final    Gran # (ANC) 10/13/2022 1.5 (L)  1.8 - 7.7 K/uL Final    Immature Grans (Abs) 10/13/2022 0.01  0.00 - 0.04 K/uL Final    Lymph # 10/13/2022 0.8 (L)  1.0 - 4.8 K/uL Final    Mono # 10/13/2022 0.2 (L)  0.3 - 1.0 K/uL Final    Eos # 10/13/2022 0.0  0.0 - 0.5 K/uL Final    Baso # 10/13/2022 0.01  0.00 - 0.20 K/uL Final    nRBC 10/13/2022 0  0 /100 WBC Final    Gran % 10/13/2022 58.8  38.0 - 73.0 % Final    Lymph % 10/13/2022 30.9  18.0 - 48.0 % Final    Mono % 10/13/2022 8.4  4.0 - 15.0 % Final    Eosinophil % 10/13/2022 1.1  0.0 - 8.0 % Final    Basophil % 10/13/2022 0.4  0.0 - 1.9 % Final    Platelet Estimate 10/13/2022 Decreased (A)   Final    Fragmented Cells 10/13/2022 Occasional   Final    Differential Method 10/13/2022 Automated   Final    Sodium 08/15/2022 139  136 - 145 mmol/L Final    Potassium 08/15/2022 3.1 (L)  3.5 - 5.1 mmol/L Final    Chloride 08/15/2022 102  95 - 110 mmol/L Final    CO2 08/15/2022 29  23 - 29 mmol/L Final    Glucose 08/15/2022 95  70 - 110 mg/dL Final    BUN 08/15/2022 13  8 - 23 mg/dL Final    Creatinine 08/15/2022 0.6  0.5 - 1.4 mg/dL Final    Calcium 08/15/2022 8.5 (L)  8.7 - 10.5 mg/dL Final    Total Protein 08/15/2022 6.2  6.0 - 8.4 g/dL Final    Albumin 08/15/2022 3.0 (L)  3.5 - 5.2 g/dL Final    Total Bilirubin 08/15/2022 0.5  0.1 - 1.0 mg/dL Final    Alkaline Phosphatase 08/15/2022 92  55 - 135 U/L Final    AST 08/15/2022 15  10 - 40 U/L Final    ALT 08/15/2022 12  10 - 44 U/L Final    Anion Gap 08/15/2022 8  8 - 16 mmol/L Final    eGFR 08/15/2022 >60  >60 mL/min/1.73 m^2 Final       Imaging:    Results for orders placed or performed during the hospital encounter of  10/11/22 (from the past 2160 hour(s))   CT Chest Abdomen Pelvis With Contrast (xpd)    Impression    1. Similar size and appearance of the cavitary right lung mass with surrounding presumed postobstructive atelectatic changes.  Increasing soft tissue fullness about the right hilum as well as interposed between the mass and sumaya.  Right paratracheal lymph nodes have mildly increased in size.  Consider PET-CT.  2. Patchy ground-glass findings within the mid and lower right lung with motion artifact limiting evaluation.  Areas of pneumonitis possible.  3. Tiny hepatic hypodensities, too small to accurately characterize.  Attention on follow-up exams.  Small volume ascites.  4. Large bowel containing left inguinal hernia without definite CT evidence of strangulation.  Note that the colon proximal to the hernia is gaseous distended.  More distal colon demonstrates normal gas and fecal material.  Correlate clinically for possible partial obstructing process.  This report was flagged in Epic as abnormal.  5. Other findings as above.      Electronically signed by: Brian Rivera MD  Date:    10/11/2022  Time:    09:55     Results for orders placed or performed during the hospital encounter of 07/28/22 (from the past 2160 hour(s))   MRI Brain W WO Contrast    Impression    1. No evidence of metastatic disease to the brain.  2. Mild to moderate patchy T2 FLAIR hyperintensity within the cerebral white matter likely relates to chronic microvascular ischemia.  3. Mild generalized atrophy.  4. Changes of chronic left maxillary sinusitis.      Electronically signed by: Gerard Hannah Jr., MD  Date:    07/28/2022  Time:    23:39     No results found for this or any previous visit (from the past 2160 hour(s)).  PET CT 5/25/22:  Head/neck: Normal physiologic activity present.    Chest: 6.2 x 5.2 cm cavitary mass lesion within the right lung which extends to the right hilum and abuts the posteromedial pleura.  Lesion is FDG avid  within SUV max of 35.  Adjacent atelectasis/airspace opacity present.  No other FDG avid pulmonary nodule appreciated.  Correlate with diagnostic CT chest imaging.     No hilar FDG avid lymph nodes.  No axillary FDG avid lymph nodes.  Mildly enlarged right paratracheal lymph no demonstrating low level activity maintaining fatty hilum. Subcentimeter short axis more cranial right paratracheal lymph node present demonstrating a SUV max 2.7.  Subcentimeter subcarinal lymph node with low level activity.     Bilateral gynecomastia noted.     Abdomen/pelvis: Physiologic activity without concerning FDG avid focus.     Renal cysts present.  Prostate enlargement.  Left inguinal hernia containing a portion of the sigmoid colon.  No evidence of obstruction or strangulation.     Skeletal/subcutaneous structures: No FDG avid osseous lesion.     Impression:     Large cavitary FDG avid right lung mass.  Malignancy versus infectious etiology..  Mediastinal lymph nodes as above.  Other findings as above.    Pathology:    1. AND 2. RIGHT LOWER LOBE TRANSBRONCHIAL AND BRONCHIAL BIOPSIES:   POSITIVE FOR SQUAMOUS CARCINOMA    ?   Assessment/Plan:   Hypokalemia    Thrombocytopenia    Chemotherapy induced neutropenia    Squamous cell carcinoma of right lung    Anemia in neoplastic disease    Other orders  -     durvalumab (IMFINZI) 1,500 mg in sodium chloride 0.9% 280 mL chemo infusion  -     EPINEPHrine (EPIPEN) 0.3 mg/0.3 mL pen injection 0.3 mg  -     diphenhydrAMINE injection 50 mg  -     hydrocortisone sodium succinate injection 100 mg  -     sodium chloride 0.9% 100 mL flush bag  -     sodium chloride 0.9% flush 10 mL  -     heparin, porcine (PF) 100 unit/mL injection flush 500 Units  -     alteplase injection 2 mg         1. Hypokalemia    2. Thrombocytopenia    3. Chemotherapy induced neutropenia    4. Squamous cell carcinoma of right lung    5. Anemia in neoplastic disease              Plan:     Problem List Items Addressed This  Visit          Renal/    Hypokalemia - Primary       Oncology    Squamous cell carcinoma of right lung     Other Visit Diagnoses       Thrombocytopenia        Chemotherapy induced neutropenia        Anemia in neoplastic disease                  # Squamous Cell Carcinoma of Right Lung, Stage IIIB; [id number]   Completed definitive chemoradiation 9/2022. We reviewed restaging imaging however this is in comparison to scans end of May 2023 and suspect interval growth prior to initiation of radiation; per discussion with Radiation Oncology interval scans throughout treatment did show improvement inpatient is clinically improved.  We discussed plan for short interval imaging in 2-3 months to ensure response to therapy.      We discussed maintenance immunotherapy discussed at length potential toxicities and signed consent with assistance of [employer] speaking phone .  Unfortunately Labs with neutropenia ANC 1.5, anemia 10 platelets 40s.  Will re-evaluate in 1 week for consideration of starting cycle 1 durvalumab.      Iron deficiency anemia:  Status post IV iron therapy completed 2 doses Feraheme on 08/15/2022.  Stable anemia but will continue to trend with ongoing therapy.      LUE superficial thrombosis: 8/23/22 left upper extremity Doppler showing extensive cephalic vein complete occlusion.  No DVT.  Given extensive nature of superficial thrombosis and high risk patient for progression given hypercoagulability with ongoing malignancy lung cancer and tobacco use recommend anticoagulation.  Sent rivaroxaban 10 mg daily.  Instructed him to discontinue megestrol.  Will need close monitoring for any bleeding and for improvement.  Tolerating well, complete resolution of edema.  He is completed treatment course with resolution of symptoms.  Platelet count low and recommended holding further anticoagulation.    Anorexia:  Improved.  Dronabinol not approved.  Recommend against Megace given thrombosis and dexamethasone  given immunotherapy.  Will continue to monitor in encouraged nutritional supplements.    Hypokalemia:  Supplementation recommended prescribed 20 mEq daily until follow-up next week    Follow-Up:     Holding treatment today due to cytopenias   Revisit 1 week with CBC CMP and consideration of cycle 1 durvalumab   Consent completed today for immunotherapy   Recommend holding anticoagulation as completed course and thrombocytopenia   Recommend potassium supplementation sent to local pharmacy

## 2022-10-14 ENCOUNTER — TELEPHONE (OUTPATIENT)
Dept: HEMATOLOGY/ONCOLOGY | Facility: CLINIC | Age: 87
End: 2022-10-14
Payer: MEDICAID

## 2022-10-20 ENCOUNTER — OFFICE VISIT (OUTPATIENT)
Dept: HEMATOLOGY/ONCOLOGY | Facility: CLINIC | Age: 87
End: 2022-10-20
Payer: MEDICAID

## 2022-10-20 ENCOUNTER — LAB VISIT (OUTPATIENT)
Dept: LAB | Facility: HOSPITAL | Age: 87
End: 2022-10-20
Attending: INTERNAL MEDICINE
Payer: MEDICAID

## 2022-10-20 VITALS
DIASTOLIC BLOOD PRESSURE: 56 MMHG | WEIGHT: 94.38 LBS | SYSTOLIC BLOOD PRESSURE: 100 MMHG | HEART RATE: 103 BPM | BODY MASS INDEX: 16.11 KG/M2 | HEIGHT: 64 IN | OXYGEN SATURATION: 98 % | TEMPERATURE: 97 F

## 2022-10-20 DIAGNOSIS — E87.6 HYPOKALEMIA: ICD-10-CM

## 2022-10-20 DIAGNOSIS — T45.1X5A CHEMOTHERAPY INDUCED NEUTROPENIA: ICD-10-CM

## 2022-10-20 DIAGNOSIS — D70.1 CHEMOTHERAPY INDUCED NEUTROPENIA: ICD-10-CM

## 2022-10-20 DIAGNOSIS — R63.0 LOSS OF APPETITE: ICD-10-CM

## 2022-10-20 DIAGNOSIS — C34.91 BRONCHOGENIC CANCER OF RIGHT LUNG: ICD-10-CM

## 2022-10-20 DIAGNOSIS — D69.6 THROMBOCYTOPENIA: Primary | ICD-10-CM

## 2022-10-20 DIAGNOSIS — C34.91 SQUAMOUS CELL CARCINOMA OF RIGHT LUNG: ICD-10-CM

## 2022-10-20 DIAGNOSIS — D63.0 ANEMIA IN NEOPLASTIC DISEASE: ICD-10-CM

## 2022-10-20 LAB
ALBUMIN SERPL BCP-MCNC: 3.1 G/DL (ref 3.5–5.2)
ALP SERPL-CCNC: 90 U/L (ref 55–135)
ALT SERPL W/O P-5'-P-CCNC: 10 U/L (ref 10–44)
ANION GAP SERPL CALC-SCNC: 11 MMOL/L (ref 8–16)
AST SERPL-CCNC: 14 U/L (ref 10–40)
BASOPHILS # BLD AUTO: 0.01 K/UL (ref 0–0.2)
BASOPHILS NFR BLD: 0.3 % (ref 0–1.9)
BILIRUB SERPL-MCNC: 0.8 MG/DL (ref 0.1–1)
BUN SERPL-MCNC: 16 MG/DL (ref 8–23)
CALCIUM SERPL-MCNC: 8.7 MG/DL (ref 8.7–10.5)
CHLORIDE SERPL-SCNC: 100 MMOL/L (ref 95–110)
CO2 SERPL-SCNC: 26 MMOL/L (ref 23–29)
CREAT SERPL-MCNC: 0.7 MG/DL (ref 0.5–1.4)
DIFFERENTIAL METHOD: ABNORMAL
EOSINOPHIL # BLD AUTO: 0 K/UL (ref 0–0.5)
EOSINOPHIL NFR BLD: 0.3 % (ref 0–8)
ERYTHROCYTE [DISTWIDTH] IN BLOOD BY AUTOMATED COUNT: 22.5 % (ref 11.5–14.5)
EST. GFR  (NO RACE VARIABLE): >60 ML/MIN/1.73 M^2
GLUCOSE SERPL-MCNC: 95 MG/DL (ref 70–110)
HCT VFR BLD AUTO: 34.1 % (ref 40–54)
HGB BLD-MCNC: 11 G/DL (ref 14–18)
IMM GRANULOCYTES # BLD AUTO: 0.01 K/UL (ref 0–0.04)
IMM GRANULOCYTES NFR BLD AUTO: 0.3 % (ref 0–0.5)
LYMPHOCYTES # BLD AUTO: 0.9 K/UL (ref 1–4.8)
LYMPHOCYTES NFR BLD: 28.8 % (ref 18–48)
MCH RBC QN AUTO: 31.3 PG (ref 27–31)
MCHC RBC AUTO-ENTMCNC: 32.3 G/DL (ref 32–36)
MCV RBC AUTO: 97 FL (ref 82–98)
MONOCYTES # BLD AUTO: 0.3 K/UL (ref 0.3–1)
MONOCYTES NFR BLD: 9.2 % (ref 4–15)
NEUTROPHILS # BLD AUTO: 1.9 K/UL (ref 1.8–7.7)
NEUTROPHILS NFR BLD: 61.1 % (ref 38–73)
NRBC BLD-RTO: 0 /100 WBC
PLATELET # BLD AUTO: 48 K/UL (ref 150–450)
PMV BLD AUTO: 9.3 FL (ref 9.2–12.9)
POTASSIUM SERPL-SCNC: 3.7 MMOL/L (ref 3.5–5.1)
PROT SERPL-MCNC: 6.7 G/DL (ref 6–8.4)
RBC # BLD AUTO: 3.51 M/UL (ref 4.6–6.2)
SODIUM SERPL-SCNC: 137 MMOL/L (ref 136–145)
WBC # BLD AUTO: 3.06 K/UL (ref 3.9–12.7)

## 2022-10-20 PROCEDURE — 1126F PR PAIN SEVERITY QUANTIFIED, NO PAIN PRESENT: ICD-10-PCS | Mod: CPTII,,, | Performed by: INTERNAL MEDICINE

## 2022-10-20 PROCEDURE — 99999 PR PBB SHADOW E&M-EST. PATIENT-LVL III: ICD-10-PCS | Mod: PBBFAC,,, | Performed by: INTERNAL MEDICINE

## 2022-10-20 PROCEDURE — 99213 OFFICE O/P EST LOW 20 MIN: CPT | Mod: PBBFAC | Performed by: INTERNAL MEDICINE

## 2022-10-20 PROCEDURE — 85025 COMPLETE CBC W/AUTO DIFF WBC: CPT | Performed by: INTERNAL MEDICINE

## 2022-10-20 PROCEDURE — 36415 COLL VENOUS BLD VENIPUNCTURE: CPT | Performed by: INTERNAL MEDICINE

## 2022-10-20 PROCEDURE — 3288F PR FALLS RISK ASSESSMENT DOCUMENTED: ICD-10-PCS | Mod: CPTII,,, | Performed by: INTERNAL MEDICINE

## 2022-10-20 PROCEDURE — 99215 PR OFFICE/OUTPT VISIT, EST, LEVL V, 40-54 MIN: ICD-10-PCS | Mod: S$PBB,,, | Performed by: INTERNAL MEDICINE

## 2022-10-20 PROCEDURE — 1126F AMNT PAIN NOTED NONE PRSNT: CPT | Mod: CPTII,,, | Performed by: INTERNAL MEDICINE

## 2022-10-20 PROCEDURE — 99999 PR PBB SHADOW E&M-EST. PATIENT-LVL III: CPT | Mod: PBBFAC,,, | Performed by: INTERNAL MEDICINE

## 2022-10-20 PROCEDURE — 99215 OFFICE O/P EST HI 40 MIN: CPT | Mod: S$PBB,,, | Performed by: INTERNAL MEDICINE

## 2022-10-20 PROCEDURE — 3288F FALL RISK ASSESSMENT DOCD: CPT | Mod: CPTII,,, | Performed by: INTERNAL MEDICINE

## 2022-10-20 PROCEDURE — 1101F PT FALLS ASSESS-DOCD LE1/YR: CPT | Mod: CPTII,,, | Performed by: INTERNAL MEDICINE

## 2022-10-20 PROCEDURE — 1101F PR PT FALLS ASSESS DOC 0-1 FALLS W/OUT INJ PAST YR: ICD-10-PCS | Mod: CPTII,,, | Performed by: INTERNAL MEDICINE

## 2022-10-20 PROCEDURE — 80053 COMPREHEN METABOLIC PANEL: CPT | Performed by: INTERNAL MEDICINE

## 2022-10-20 RX ORDER — DEXAMETHASONE 2 MG/1
2 TABLET ORAL 2 TIMES DAILY WITH MEALS
Qty: 28 TABLET | Refills: 0 | Status: SHIPPED | OUTPATIENT
Start: 2022-10-20 | End: 2022-10-30

## 2022-10-20 NOTE — PROGRESS NOTES
Subjective:      DATE OF VISIT: 10/20/22     ?  Patient ID:?Real Pagan is a 87 y.o. male.?? MR#: [id number]   ?   PRIMARY ONCOLOGIST: Dr. Paiz    ? Primary Care Providers:  Shruthi Burr MD, MD (General)     CHIEF COMPLAINT: ?Follow-up consideration of durvalumab cycle 1    ONCOLOGIC DIAGNOSIS: Right lung squamous cell carcinoma [id number], stage IIIB  ?   CURRENT TREATMENT:  Plan for maintenance durvalumab    PAST TREATMENT: Biopsy  ? concurrent chemoradiation with carboplatin paclitaxel, Completed 09/20/2022     Interval events:    Entire family with cold symptoms no fever chills, mild cough and congestion.    No bleeding.  Diminished appetite and weight loss noted with generalized fatigue.  He has discontinued anticoagulation.  Continues on potassium supplement.  Continues oral iron supplement.        ONCOLOGIC HISTORY:   5/6/22: Pt presented to PCP with complaints of unintentional weight loss, fatigue, non-productive cough, dyspnea on exertion, and shortness of breath. CXR demonstrated cavitary mass-like density seen projecting over the right hilar region.    5/24/22: PET CT demonstrated 6.2cm cavitary mass in right lung extending to right hilum and abutting pleura, avid max SUV 35, mildly enlarged right paratracheal node low level uptake max SUV 2.7, no distant metastases noted.    7/1/22: bronchoscopy - right lower lobe transbronchial and bronchial biopsies positive for squamous cell carcinoma  ?   Oncology History   Squamous cell carcinoma of right lung   7/11/2022 Initial Diagnosis    Squamous cell carcinoma of right lung     7/18/2022 Cancer Staged    Staging form: Lung, AJCC 8th Edition  - Clinical stage from 7/18/2022: Stage IIIB (cT3, cN2, cM0)       8/9/2022 - 9/20/2022 Radiation Therapy    Treating physician: Romina  Total Dose: 60 Gy  Fractions: 30     8/10/2022 - 9/22/2022 Chemotherapy    Treatment Summary   Plan Name: OP NSCLC PACLITAXEL + CARBOPLATIN (AUC) QW +  RADIATION  Treatment Goal: Curative  Status: Inactive  Start Date: 8/10/2022  End Date: 9/22/2022  Provider: Lindsay Paiz MD  Chemotherapy: CARBOplatin (PARAPLATIN) 155 mg in sodium chloride 0.9% 265.5 mL chemo infusion, 155 mg (100 % of original dose 155.6 mg), Intravenous, Clinic/HOD 1 time, 5 of 6 cycles  Dose modification:   (original dose 155.6 mg, Cycle 1),   (original dose 155.6 mg, Cycle 2),   (original dose 155.6 mg, Cycle 3)  Administration: 155 mg (8/10/2022), 155 mg (8/17/2022), 155 mg (8/24/2022), 140 mg (9/22/2022), 155 mg (9/15/2022)  PACLitaxeL (TAXOL) 45 mg/m2 = 66 mg in sodium chloride 0.9% 100 mL chemo infusion, 45 mg/m2 = 66 mg, Intravenous, Clinic/HOD 1 time, 5 of 6 cycles  Administration: 66 mg (8/10/2022), 66 mg (8/17/2022), 66 mg (8/24/2022), 66 mg (9/15/2022), 66 mg (9/22/2022)       10/13/2022 -  Chemotherapy    Treatment Summary   Plan Name: OP DURVALUMAB 1500 MG Q4W  Treatment Goal: Curative  Status: Active  Start Date: 10/13/2022 (Planned)  End Date: 9/14/2023 (Planned)  Provider: Lindsay Paiz MD  Chemotherapy: [No matching medication found in this treatment plan]         Cancer Staging  Right lung squamous cell carcinoma [id number], stage IIIB      Objective:      Vitals:    10/20/22 0743   BP: (!) 100/56   Pulse: 103   Temp: 97 °F (36.1 °C)      ?   ECOG:?2  General appearance: Generally well appearing, in no acute distress.   Head, eyes, ears, nose, and throat: moist mucous membranes.   Respiratory:  Normal work of breathing  Abdomen: cachectic, nontender, nondistended.   Extremities: Warm, without edema.   Neurologic: Alert and oriented. Grossly normal strength, coordination, and gait.   Skin: No rashes, ecchymoses or petechial lesion.   Psychiatric:  Normal mood and affect.    ?   Laboratory:  ?   Lab Visit on 10/20/2022   Component Date Value Ref Range Status    WBC 10/20/2022 3.06 (L)  3.90 - 12.70 K/uL Final    RBC 10/20/2022 3.51 (L)  4.60 - 6.20 M/uL Final     Hemoglobin 10/20/2022 11.0 (L)  14.0 - 18.0 g/dL Final    Hematocrit 10/20/2022 34.1 (L)  40.0 - 54.0 % Final    MCV 10/20/2022 97  82 - 98 fL Final    MCH 10/20/2022 31.3 (H)  27.0 - 31.0 pg Final    MCHC 10/20/2022 32.3  32.0 - 36.0 g/dL Final    RDW 10/20/2022 22.5 (H)  11.5 - 14.5 % Final    Platelets 10/20/2022 48 (L)  150 - 450 K/uL Final    MPV 10/20/2022 9.3  9.2 - 12.9 fL Final    Immature Granulocytes 10/20/2022 0.3  0.0 - 0.5 % Final    Gran # (ANC) 10/20/2022 1.9  1.8 - 7.7 K/uL Final    Immature Grans (Abs) 10/20/2022 0.01  0.00 - 0.04 K/uL Final    Lymph # 10/20/2022 0.9 (L)  1.0 - 4.8 K/uL Final    Mono # 10/20/2022 0.3  0.3 - 1.0 K/uL Final    Eos # 10/20/2022 0.0  0.0 - 0.5 K/uL Final    Baso # 10/20/2022 0.01  0.00 - 0.20 K/uL Final    nRBC 10/20/2022 0  0 /100 WBC Final    Gran % 10/20/2022 61.1  38.0 - 73.0 % Final    Lymph % 10/20/2022 28.8  18.0 - 48.0 % Final    Mono % 10/20/2022 9.2  4.0 - 15.0 % Final    Eosinophil % 10/20/2022 0.3  0.0 - 8.0 % Final    Basophil % 10/20/2022 0.3  0.0 - 1.9 % Final    Differential Method 10/20/2022 Automated   Final    Sodium 10/20/2022 137  136 - 145 mmol/L Final    Potassium 10/20/2022 3.7  3.5 - 5.1 mmol/L Final    Chloride 10/20/2022 100  95 - 110 mmol/L Final    CO2 10/20/2022 26  23 - 29 mmol/L Final    Glucose 10/20/2022 95  70 - 110 mg/dL Final    BUN 10/20/2022 16  8 - 23 mg/dL Final    Creatinine 10/20/2022 0.7  0.5 - 1.4 mg/dL Final    Calcium 10/20/2022 8.7  8.7 - 10.5 mg/dL Final    Total Protein 10/20/2022 6.7  6.0 - 8.4 g/dL Final    Albumin 10/20/2022 3.1 (L)  3.5 - 5.2 g/dL Final    Total Bilirubin 10/20/2022 0.8  0.1 - 1.0 mg/dL Final    Alkaline Phosphatase 10/20/2022 90  55 - 135 U/L Final    AST 10/20/2022 14  10 - 40 U/L Final    ALT 10/20/2022 10  10 - 44 U/L Final    Anion Gap 10/20/2022 11  8 - 16 mmol/L Final    eGFR 10/20/2022 >60  >60 mL/min/1.73 m^2 Final       Imaging:    Results for orders placed or performed during the hospital  encounter of 10/11/22 (from the past 2160 hour(s))   CT Chest Abdomen Pelvis With Contrast (xpd)    Impression    1. Similar size and appearance of the cavitary right lung mass with surrounding presumed postobstructive atelectatic changes.  Increasing soft tissue fullness about the right hilum as well as interposed between the mass and sumaya.  Right paratracheal lymph nodes have mildly increased in size.  Consider PET-CT.  2. Patchy ground-glass findings within the mid and lower right lung with motion artifact limiting evaluation.  Areas of pneumonitis possible.  3. Tiny hepatic hypodensities, too small to accurately characterize.  Attention on follow-up exams.  Small volume ascites.  4. Large bowel containing left inguinal hernia without definite CT evidence of strangulation.  Note that the colon proximal to the hernia is gaseous distended.  More distal colon demonstrates normal gas and fecal material.  Correlate clinically for possible partial obstructing process.  This report was flagged in Epic as abnormal.  5. Other findings as above.      Electronically signed by: Brian Rivera MD  Date:    10/11/2022  Time:    09:55     Results for orders placed or performed during the hospital encounter of 07/28/22 (from the past 2160 hour(s))   MRI Brain W WO Contrast    Impression    1. No evidence of metastatic disease to the brain.  2. Mild to moderate patchy T2 FLAIR hyperintensity within the cerebral white matter likely relates to chronic microvascular ischemia.  3. Mild generalized atrophy.  4. Changes of chronic left maxillary sinusitis.      Electronically signed by: Gerard Hannah Jr., MD  Date:    07/28/2022  Time:    23:39     No results found for this or any previous visit (from the past 2160 hour(s)).  PET CT 5/25/22:  Head/neck: Normal physiologic activity present.    Chest: 6.2 x 5.2 cm cavitary mass lesion within the right lung which extends to the right hilum and abuts the posteromedial pleura.  Lesion is  FDG avid within SUV max of 35.  Adjacent atelectasis/airspace opacity present.  No other FDG avid pulmonary nodule appreciated.  Correlate with diagnostic CT chest imaging.     No hilar FDG avid lymph nodes.  No axillary FDG avid lymph nodes.  Mildly enlarged right paratracheal lymph no demonstrating low level activity maintaining fatty hilum. Subcentimeter short axis more cranial right paratracheal lymph node present demonstrating a SUV max 2.7.  Subcentimeter subcarinal lymph node with low level activity.     Bilateral gynecomastia noted.     Abdomen/pelvis: Physiologic activity without concerning FDG avid focus.     Renal cysts present.  Prostate enlargement.  Left inguinal hernia containing a portion of the sigmoid colon.  No evidence of obstruction or strangulation.     Skeletal/subcutaneous structures: No FDG avid osseous lesion.     Impression:     Large cavitary FDG avid right lung mass.  Malignancy versus infectious etiology..  Mediastinal lymph nodes as above.  Other findings as above.    Pathology:    1. AND 2. RIGHT LOWER LOBE TRANSBRONCHIAL AND BRONCHIAL BIOPSIES:   POSITIVE FOR SQUAMOUS CARCINOMA    ?   Assessment/Plan:   Thrombocytopenia  -     Platelet Count, Blue Top; Future; Expected date: 10/20/2022    Loss of appetite  -     dexAMETHasone (DECADRON) 2 MG tablet; Take 1 tablet (2 mg total) by mouth 2 (two) times daily with meals. for 10 days  Dispense: 28 tablet; Refill: 0         1. Thrombocytopenia    2. Loss of appetite              Plan:     Problem List Items Addressed This Visit          Other    Loss of appetite     Other Visit Diagnoses       Thrombocytopenia    -  Primary              # Squamous Cell Carcinoma of Right Lung, Stage IIIB; [id number]   Completed definitive chemoradiation 9/2022. We reviewed restaging imaging however this is in comparison to scans end of May 2023 and suspect interval growth prior to initiation of radiation; per discussion with Radiation Oncology interval scans  throughout treatment did show improvement inpatient is clinically improved.  We discussed plan for short interval imaging in 2-3 months to ensure response to therapy.      We discussed maintenance immunotherapy discussed at length potential toxicities and signed consent with assistance of [place] speaking phone .  Unwell currently with cold whole family.  Discussed COVID testing consideration they prefer to defer this time.  No fevers.  Recommend supportive care and consideration of COVID testing.  Will hold off on treatment re-evaluate in 2 weeks.  Labs with some improvement in leukopenia persistent thrombocytopenia, no bleeding.  Continue to monitor.    Iron deficiency anemia:  Status post IV iron therapy completed 2 doses Feraheme on 08/15/2022.  Stable anemia but will continue to trend with ongoing therapy.  Continues on p.o. iron supplementation    LUE superficial thrombosis: 8/23/22 left upper extremity Doppler showing extensive cephalic vein complete occlusion.  No DVT.  Given extensive nature of superficial thrombosis and high risk patient for progression given hypercoagulability with ongoing malignancy lung cancer and tobacco use recommend anticoagulation.  Sent rivaroxaban 10 mg daily.  Instructed him to discontinue megestrol.  Will need close monitoring for any bleeding and for improvement.  Tolerating well, complete resolution of edema.  He is completed treatment course with resolution of symptoms.  Platelet count low and recommended holding further anticoagulation.    Anorexia:  Improved.  Dronabinol not approved.  Recommend against Megace given thrombosis.  Held dexamethasone given plan for immunotherapy but he has had significant decline in appetite and weight over the last week likely contributed to by ongoing illness but do feel okay to restart dexamethasone tentative pharmacy    Hypokalemia:  Potassium in good range today continue on K supplementation 20 mEq daily    Follow-Up:   Patient  Instructions   Holding 1st durvalumab was planned for today due to clinical status and lab work   Revisit 2 weeks with CBC CMP prior, Dr. Paiz re-evaluation for cycle 1 day 1 durvalumab     Note:  Has appointment next week with Radiation Oncology

## 2022-10-20 NOTE — PATIENT INSTRUCTIONS
Holding 1st durvalumab was planned for today due to clinical status and lab work   Revisit 2 weeks with CBC CMP prior, Dr. Paiz re-evaluation for cycle 1 day 1 durvalumab     Note:  Has appointment next week with Radiation Oncology

## 2022-10-28 ENCOUNTER — OFFICE VISIT (OUTPATIENT)
Dept: RADIATION ONCOLOGY | Facility: CLINIC | Age: 87
End: 2022-10-28
Payer: MEDICAID

## 2022-10-28 VITALS
WEIGHT: 92.63 LBS | RESPIRATION RATE: 18 BRPM | OXYGEN SATURATION: 99 % | HEART RATE: 91 BPM | TEMPERATURE: 97 F | DIASTOLIC BLOOD PRESSURE: 69 MMHG | BODY MASS INDEX: 15.89 KG/M2 | SYSTOLIC BLOOD PRESSURE: 98 MMHG

## 2022-10-28 DIAGNOSIS — C34.91 BRONCHOGENIC CANCER OF RIGHT LUNG: Primary | ICD-10-CM

## 2022-10-28 PROCEDURE — 1126F AMNT PAIN NOTED NONE PRSNT: CPT | Mod: CPTII,,, | Performed by: RADIOLOGY

## 2022-10-28 PROCEDURE — 99213 OFFICE O/P EST LOW 20 MIN: CPT | Mod: PBBFAC | Performed by: RADIOLOGY

## 2022-10-28 PROCEDURE — 1126F PR PAIN SEVERITY QUANTIFIED, NO PAIN PRESENT: ICD-10-PCS | Mod: CPTII,,, | Performed by: RADIOLOGY

## 2022-10-28 PROCEDURE — 99999 PR PBB SHADOW E&M-EST. PATIENT-LVL III: CPT | Mod: PBBFAC,,, | Performed by: RADIOLOGY

## 2022-10-28 PROCEDURE — 99999 PR PBB SHADOW E&M-EST. PATIENT-LVL III: ICD-10-PCS | Mod: PBBFAC,,, | Performed by: RADIOLOGY

## 2022-10-28 PROCEDURE — 1159F PR MEDICATION LIST DOCUMENTED IN MEDICAL RECORD: ICD-10-PCS | Mod: CPTII,,, | Performed by: RADIOLOGY

## 2022-10-28 PROCEDURE — 1159F MED LIST DOCD IN RCRD: CPT | Mod: CPTII,,, | Performed by: RADIOLOGY

## 2022-10-28 PROCEDURE — 99213 PR OFFICE/OUTPT VISIT, EST, LEVL III, 20-29 MIN: ICD-10-PCS | Mod: S$PBB,,, | Performed by: RADIOLOGY

## 2022-10-28 PROCEDURE — 99213 OFFICE O/P EST LOW 20 MIN: CPT | Mod: S$PBB,,, | Performed by: RADIOLOGY

## 2022-10-28 NOTE — PROGRESS NOTES
[facility]  RADIATION ONCOLOGY FOLLOW UP    Name: Real Pagan : 1935     DIAGNOSIS: R lung squamous cell carcinoma [id number], stage IIIB    TREATMENT HISTORY:   60Gy/30fx chemoradiation (66Gy tumor SIB) completed 22  Adjuvant immunotherapy planned    INTERVAL HISTORY: Real Pagan is a 87 y.o. male who presents today for follow-up.  This is their first follow up since completing radiation. During treatment,    completed 60Gy/30fx with concurrent chemotherapy and SIB to 66Gy internal tumor boost without interruption. He had low appetite and mild weight loss until given some steroids which provided temporary appetite boost. Daily CBCT showed dramatic tumor shrinkage from start to finish. Mild cough, no hemoptysis, no esophagitis.     CT 10/11/22 showed lung mass relatively stable, no new disease.    Since then, set to start durvalumab next week  Today, he is doing well but cough has worsened.  Denies chest pain, hemoptysis, esophagitis/dysphagia.  Still cough with some sputum.  Weight 92lb today, was 110lb at consult 3mo ago  Still with decreased appetite.    PHYSICAL EXAM:   Constitutional: well appearing, no acute distress, ECOG 2 - Ambulates, capable of self care only  Vitals:    BP 98/69   Pulse 91   Temp 97.3 °F (36.3 °C)   Resp 18   Wt 42 kg (92 lb 9.6 oz)   SpO2 99%   BMI 15.89 kg/m²   Eyes: sclera anicteric, EOMI, pupils equal, round and reactive to light  ENT: oral cavity without lesions, moist mucous membranes  Neck: trachea midline, neck supple  Lymphatic: no cervical, supraclavicular or axillary adenopathy  Cardiovascular: regular rate, no edema of the upper or lower extremities, radial pulse 2+  Respiratory: unlabored effort, clear to auscultation, no wheezes  Abdomen: soft, non-tender, no rigidity, no masses, no hepatomegaly    Laboratory & X-Ray Findings: Per above.  Images reviewed personally.    ASSESSMENT: recovering well from acute toxicities  of radiation with good imaging response    PLAN: Mr. Pagan tolerated chemoradiation very well besides decreased appetite that was pre-existing.  While imaging report does not show much appreciable shrinkage, this is likely due to time interval between comparison studies - large lung tumor had grown between initial PET and start of radiation; became centrally necrotic and did shrink during radiation.    Mucinex for cough, counseled to eat more.  Follow up in 4-6 months after next imaging dictated by [facility] schedule while on immunotherapy.    I spent approximately 20 minutes reviewing the available records and evaluating the patient, out of which over 50% of the time was spent face to face with the patient in counseling and coordinating this patient's care.    Alvarado Vanegas III, M.D.  Radiation Oncology  [facility]  [id number] [place] [address] [place] [address]  Ph: [phone]  [email or web address]

## 2022-10-31 DIAGNOSIS — C34.91 SQUAMOUS CELL CARCINOMA OF RIGHT LUNG: Primary | ICD-10-CM

## 2022-11-03 ENCOUNTER — OFFICE VISIT (OUTPATIENT)
Dept: HEMATOLOGY/ONCOLOGY | Facility: CLINIC | Age: 87
End: 2022-11-03
Payer: MEDICAID

## 2022-11-03 ENCOUNTER — LAB VISIT (OUTPATIENT)
Dept: LAB | Facility: HOSPITAL | Age: 87
End: 2022-11-03
Attending: INTERNAL MEDICINE
Payer: MEDICAID

## 2022-11-03 ENCOUNTER — INFUSION (OUTPATIENT)
Dept: INFUSION THERAPY | Facility: HOSPITAL | Age: 87
End: 2022-11-03
Attending: INTERNAL MEDICINE
Payer: MEDICAID

## 2022-11-03 VITALS
HEART RATE: 67 BPM | BODY MASS INDEX: 15.5 KG/M2 | SYSTOLIC BLOOD PRESSURE: 127 MMHG | TEMPERATURE: 97 F | HEIGHT: 65 IN | OXYGEN SATURATION: 98 % | DIASTOLIC BLOOD PRESSURE: 58 MMHG | WEIGHT: 93.06 LBS

## 2022-11-03 VITALS
TEMPERATURE: 98 F | RESPIRATION RATE: 16 BRPM | HEART RATE: 60 BPM | OXYGEN SATURATION: 98 % | SYSTOLIC BLOOD PRESSURE: 115 MMHG | DIASTOLIC BLOOD PRESSURE: 56 MMHG

## 2022-11-03 DIAGNOSIS — C34.91 BRONCHOGENIC CANCER OF RIGHT LUNG: ICD-10-CM

## 2022-11-03 DIAGNOSIS — C34.91 SQUAMOUS CELL CARCINOMA OF RIGHT LUNG: Primary | ICD-10-CM

## 2022-11-03 DIAGNOSIS — D70.1 CHEMOTHERAPY INDUCED NEUTROPENIA: ICD-10-CM

## 2022-11-03 DIAGNOSIS — T45.1X5A CHEMOTHERAPY INDUCED NEUTROPENIA: ICD-10-CM

## 2022-11-03 DIAGNOSIS — C34.91 SQUAMOUS CELL CARCINOMA OF RIGHT LUNG: ICD-10-CM

## 2022-11-03 DIAGNOSIS — R63.0 LOSS OF APPETITE: ICD-10-CM

## 2022-11-03 DIAGNOSIS — D69.6 THROMBOCYTOPENIA: ICD-10-CM

## 2022-11-03 LAB
ALBUMIN SERPL BCP-MCNC: 2.3 G/DL (ref 3.5–5.2)
ALP SERPL-CCNC: 105 U/L (ref 55–135)
ALT SERPL W/O P-5'-P-CCNC: 22 U/L (ref 10–44)
ANION GAP SERPL CALC-SCNC: 10 MMOL/L (ref 8–16)
AST SERPL-CCNC: 19 U/L (ref 10–40)
BASOPHILS # BLD AUTO: 0.01 K/UL (ref 0–0.2)
BASOPHILS NFR BLD: 0.1 % (ref 0–1.9)
BILIRUB SERPL-MCNC: 0.3 MG/DL (ref 0.1–1)
BUN SERPL-MCNC: 16 MG/DL (ref 8–23)
CALCIUM SERPL-MCNC: 8.9 MG/DL (ref 8.7–10.5)
CHLORIDE SERPL-SCNC: 100 MMOL/L (ref 95–110)
CO2 SERPL-SCNC: 28 MMOL/L (ref 23–29)
CREAT SERPL-MCNC: 0.6 MG/DL (ref 0.5–1.4)
DIFFERENTIAL METHOD: ABNORMAL
EOSINOPHIL # BLD AUTO: 0 K/UL (ref 0–0.5)
EOSINOPHIL NFR BLD: 0.1 % (ref 0–8)
ERYTHROCYTE [DISTWIDTH] IN BLOOD BY AUTOMATED COUNT: 20.1 % (ref 11.5–14.5)
EST. GFR  (NO RACE VARIABLE): >60 ML/MIN/1.73 M^2
GLUCOSE SERPL-MCNC: 79 MG/DL (ref 70–110)
HCT VFR BLD AUTO: 31.9 % (ref 40–54)
HGB BLD-MCNC: 10.1 G/DL (ref 14–18)
IMM GRANULOCYTES # BLD AUTO: 0.06 K/UL (ref 0–0.04)
IMM GRANULOCYTES NFR BLD AUTO: 0.8 % (ref 0–0.5)
LYMPHOCYTES # BLD AUTO: 0.8 K/UL (ref 1–4.8)
LYMPHOCYTES NFR BLD: 11 % (ref 18–48)
MCH RBC QN AUTO: 31.4 PG (ref 27–31)
MCHC RBC AUTO-ENTMCNC: 31.7 G/DL (ref 32–36)
MCV RBC AUTO: 99 FL (ref 82–98)
MONOCYTES # BLD AUTO: 0.5 K/UL (ref 0.3–1)
MONOCYTES NFR BLD: 7.1 % (ref 4–15)
NEUTROPHILS # BLD AUTO: 5.8 K/UL (ref 1.8–7.7)
NEUTROPHILS NFR BLD: 80.9 % (ref 38–73)
NRBC BLD-RTO: 0 /100 WBC
PLATELET # BLD AUTO: 182 K/UL (ref 150–450)
PMV BLD AUTO: 8.9 FL (ref 9.2–12.9)
POTASSIUM SERPL-SCNC: 4 MMOL/L (ref 3.5–5.1)
PROT SERPL-MCNC: 7 G/DL (ref 6–8.4)
RBC # BLD AUTO: 3.22 M/UL (ref 4.6–6.2)
SODIUM SERPL-SCNC: 138 MMOL/L (ref 136–145)
TSH SERPL DL<=0.005 MIU/L-ACNC: 1.48 UIU/ML (ref 0.4–4)
WBC # BLD AUTO: 7.15 K/UL (ref 3.9–12.7)

## 2022-11-03 PROCEDURE — 84443 ASSAY THYROID STIM HORMONE: CPT | Performed by: INTERNAL MEDICINE

## 2022-11-03 PROCEDURE — 1160F PR REVIEW ALL MEDS BY PRESCRIBER/CLIN PHARMACIST DOCUMENTED: ICD-10-PCS | Mod: CPTII,,, | Performed by: INTERNAL MEDICINE

## 2022-11-03 PROCEDURE — 99215 PR OFFICE/OUTPT VISIT, EST, LEVL V, 40-54 MIN: ICD-10-PCS | Mod: S$PBB,,, | Performed by: INTERNAL MEDICINE

## 2022-11-03 PROCEDURE — 1126F AMNT PAIN NOTED NONE PRSNT: CPT | Mod: CPTII,,, | Performed by: INTERNAL MEDICINE

## 2022-11-03 PROCEDURE — 99215 OFFICE O/P EST HI 40 MIN: CPT | Mod: S$PBB,,, | Performed by: INTERNAL MEDICINE

## 2022-11-03 PROCEDURE — 1101F PT FALLS ASSESS-DOCD LE1/YR: CPT | Mod: CPTII,,, | Performed by: INTERNAL MEDICINE

## 2022-11-03 PROCEDURE — 3288F PR FALLS RISK ASSESSMENT DOCUMENTED: ICD-10-PCS | Mod: CPTII,,, | Performed by: INTERNAL MEDICINE

## 2022-11-03 PROCEDURE — 1159F PR MEDICATION LIST DOCUMENTED IN MEDICAL RECORD: ICD-10-PCS | Mod: CPTII,,, | Performed by: INTERNAL MEDICINE

## 2022-11-03 PROCEDURE — 80053 COMPREHEN METABOLIC PANEL: CPT | Performed by: INTERNAL MEDICINE

## 2022-11-03 PROCEDURE — 1160F RVW MEDS BY RX/DR IN RCRD: CPT | Mod: CPTII,,, | Performed by: INTERNAL MEDICINE

## 2022-11-03 PROCEDURE — 1159F MED LIST DOCD IN RCRD: CPT | Mod: CPTII,,, | Performed by: INTERNAL MEDICINE

## 2022-11-03 PROCEDURE — 3288F FALL RISK ASSESSMENT DOCD: CPT | Mod: CPTII,,, | Performed by: INTERNAL MEDICINE

## 2022-11-03 PROCEDURE — 36415 COLL VENOUS BLD VENIPUNCTURE: CPT | Performed by: INTERNAL MEDICINE

## 2022-11-03 PROCEDURE — 63600175 PHARM REV CODE 636 W HCPCS: Mod: JG | Performed by: INTERNAL MEDICINE

## 2022-11-03 PROCEDURE — 85025 COMPLETE CBC W/AUTO DIFF WBC: CPT | Performed by: INTERNAL MEDICINE

## 2022-11-03 PROCEDURE — 99213 OFFICE O/P EST LOW 20 MIN: CPT | Mod: PBBFAC,25 | Performed by: INTERNAL MEDICINE

## 2022-11-03 PROCEDURE — 1126F PR PAIN SEVERITY QUANTIFIED, NO PAIN PRESENT: ICD-10-PCS | Mod: CPTII,,, | Performed by: INTERNAL MEDICINE

## 2022-11-03 PROCEDURE — 99999 PR PBB SHADOW E&M-EST. PATIENT-LVL III: CPT | Mod: PBBFAC,,, | Performed by: INTERNAL MEDICINE

## 2022-11-03 PROCEDURE — 25000003 PHARM REV CODE 250: Performed by: INTERNAL MEDICINE

## 2022-11-03 PROCEDURE — 96413 CHEMO IV INFUSION 1 HR: CPT

## 2022-11-03 PROCEDURE — 99999 PR PBB SHADOW E&M-EST. PATIENT-LVL III: ICD-10-PCS | Mod: PBBFAC,,, | Performed by: INTERNAL MEDICINE

## 2022-11-03 PROCEDURE — 1101F PR PT FALLS ASSESS DOC 0-1 FALLS W/OUT INJ PAST YR: ICD-10-PCS | Mod: CPTII,,, | Performed by: INTERNAL MEDICINE

## 2022-11-03 RX ORDER — POTASSIUM CHLORIDE 20 MEQ/1
20 TABLET, EXTENDED RELEASE ORAL DAILY
COMMUNITY
Start: 2022-10-27 | End: 2022-12-02 | Stop reason: SDUPTHER

## 2022-11-03 RX ADMIN — DURVALUMAB 1500 MG: 500 INJECTION, SOLUTION INTRAVENOUS at 10:11

## 2022-11-03 NOTE — PROGRESS NOTES
Subjective:      DATE OF VISIT: 11/3/22     ?  Patient ID:?Real Pagan is a 87 y.o. male.?? MR#: [id number]   ?   PRIMARY ONCOLOGIST: Dr. Paiz    ? Primary Care Providers:  Shruthi Burr MD, MD (General)     CHIEF COMPLAINT: ?Follow-up consideration of durvalumab cycle 1    ONCOLOGIC DIAGNOSIS: Right lung squamous cell carcinoma [id number], stage IIIB  ?   CURRENT TREATMENT:  Plan for maintenance durvalumab    PAST TREATMENT: Biopsy  ? concurrent chemoradiation with carboplatin paclitaxel, Completed 09/20/2022     Interval events:    Patient and family have all improved from prior respiratory symptoms.  Does continue have difficulty with limited appetite and difficulty with weight gain.  He has been using dexamethasone low-dose to assist with this but little improvement.      ONCOLOGIC HISTORY:   5/6/22: Pt presented to PCP with complaints of unintentional weight loss, fatigue, non-productive cough, dyspnea on exertion, and shortness of breath. CXR demonstrated cavitary mass-like density seen projecting over the right hilar region.    5/24/22: PET CT demonstrated 6.2cm cavitary mass in right lung extending to right hilum and abutting pleura, avid max SUV 35, mildly enlarged right paratracheal node low level uptake max SUV 2.7, no distant metastases noted.    7/1/22: bronchoscopy - right lower lobe transbronchial and bronchial biopsies positive for squamous cell carcinoma  ?   Oncology History   Squamous cell carcinoma of right lung   7/11/2022 Initial Diagnosis    Squamous cell carcinoma of right lung     7/18/2022 Cancer Staged    Staging form: Lung, AJCC 8th Edition  - Clinical stage from 7/18/2022: Stage IIIB (cT3, cN2, cM0)       8/9/2022 - 9/20/2022 Radiation Therapy    Treating physician: Romina  Total Dose: 60 Gy  Fractions: 30     8/10/2022 - 9/22/2022 Chemotherapy    Treatment Summary   Plan Name: OP NSCLC PACLITAXEL + CARBOPLATIN (AUC) QW + RADIATION  Treatment Goal: Curative  Status:  Inactive  Start Date: 8/10/2022  End Date: 9/22/2022  Provider: Lindsay Paiz MD  Chemotherapy: CARBOplatin (PARAPLATIN) 155 mg in sodium chloride 0.9% 265.5 mL chemo infusion, 155 mg (100 % of original dose 155.6 mg), Intravenous, Clinic/HOD 1 time, 5 of 6 cycles  Dose modification:   (original dose 155.6 mg, Cycle 1),   (original dose 155.6 mg, Cycle 2),   (original dose 155.6 mg, Cycle 3)  Administration: 155 mg (8/10/2022), 155 mg (8/17/2022), 155 mg (8/24/2022), 140 mg (9/22/2022), 155 mg (9/15/2022)  PACLitaxeL (TAXOL) 45 mg/m2 = 66 mg in sodium chloride 0.9% 100 mL chemo infusion, 45 mg/m2 = 66 mg, Intravenous, Clinic/HOD 1 time, 5 of 6 cycles  Administration: 66 mg (8/10/2022), 66 mg (8/17/2022), 66 mg (8/24/2022), 66 mg (9/15/2022), 66 mg (9/22/2022)       10/13/2022 -  Chemotherapy    Treatment Summary   Plan Name: OP DURVALUMAB 1500 MG Q4W  Treatment Goal: Curative  Status: Active  Start Date: 10/13/2022 (Planned)  End Date: 9/14/2023 (Planned)  Provider: Lindsay Paiz MD  Chemotherapy: [No matching medication found in this treatment plan]         Cancer Staging  Right lung squamous cell carcinoma [id number], stage IIIB      Objective:      Vitals:    11/03/22 0838   BP: (!) 127/58   Pulse: 67   Temp: 97.1 °F (36.2 °C)      ?   ECOG:?2  General appearance: Generally well appearing, in no acute distress.   Head, eyes, ears, nose, and throat: moist mucous membranes.   Respiratory:  Normal work of breathing  Abdomen: cachectic, nontender, nondistended.   Extremities: Warm, without edema.   Neurologic: Alert and oriented. Grossly normal strength, coordination, and gait.   Skin: No rashes, ecchymoses or petechial lesion.   Psychiatric:  Normal mood and affect.    ?   Laboratory:  ?   Lab Visit on 11/03/2022   Component Date Value Ref Range Status    WBC 11/03/2022 7.15  3.90 - 12.70 K/uL Final    RBC 11/03/2022 3.22 (L)  4.60 - 6.20 M/uL Final    Hemoglobin 11/03/2022 10.1 (L)  14.0 - 18.0 g/dL Final     Hematocrit 11/03/2022 31.9 (L)  40.0 - 54.0 % Final    MCV 11/03/2022 99 (H)  82 - 98 fL Final    MCH 11/03/2022 31.4 (H)  27.0 - 31.0 pg Final    MCHC 11/03/2022 31.7 (L)  32.0 - 36.0 g/dL Final    RDW 11/03/2022 20.1 (H)  11.5 - 14.5 % Final    Platelets 11/03/2022 182  150 - 450 K/uL Final    MPV 11/03/2022 8.9 (L)  9.2 - 12.9 fL Final    Immature Granulocytes 11/03/2022 0.8 (H)  0.0 - 0.5 % Final    Gran # (ANC) 11/03/2022 5.8  1.8 - 7.7 K/uL Final    Immature Grans (Abs) 11/03/2022 0.06 (H)  0.00 - 0.04 K/uL Final    Lymph # 11/03/2022 0.8 (L)  1.0 - 4.8 K/uL Final    Mono # 11/03/2022 0.5  0.3 - 1.0 K/uL Final    Eos # 11/03/2022 0.0  0.0 - 0.5 K/uL Final    Baso # 11/03/2022 0.01  0.00 - 0.20 K/uL Final    nRBC 11/03/2022 0  0 /100 WBC Final    Gran % 11/03/2022 80.9 (H)  38.0 - 73.0 % Final    Lymph % 11/03/2022 11.0 (L)  18.0 - 48.0 % Final    Mono % 11/03/2022 7.1  4.0 - 15.0 % Final    Eosinophil % 11/03/2022 0.1  0.0 - 8.0 % Final    Basophil % 11/03/2022 0.1  0.0 - 1.9 % Final    Differential Method 11/03/2022 Automated   Final    Sodium 11/03/2022 138  136 - 145 mmol/L Final    Potassium 11/03/2022 4.0  3.5 - 5.1 mmol/L Final    Chloride 11/03/2022 100  95 - 110 mmol/L Final    CO2 11/03/2022 28  23 - 29 mmol/L Final    Glucose 11/03/2022 79  70 - 110 mg/dL Final    BUN 11/03/2022 16  8 - 23 mg/dL Final    Creatinine 11/03/2022 0.6  0.5 - 1.4 mg/dL Final    Calcium 11/03/2022 8.9  8.7 - 10.5 mg/dL Final    Total Protein 11/03/2022 7.0  6.0 - 8.4 g/dL Final    Albumin 11/03/2022 2.3 (L)  3.5 - 5.2 g/dL Final    Total Bilirubin 11/03/2022 0.3  0.1 - 1.0 mg/dL Final    Alkaline Phosphatase 11/03/2022 105  55 - 135 U/L Final    AST 11/03/2022 19  10 - 40 U/L Final    ALT 11/03/2022 22  10 - 44 U/L Final    Anion Gap 11/03/2022 10  8 - 16 mmol/L Final    eGFR 11/03/2022 >60  >60 mL/min/1.73 m^2 Final       Imaging:    Results for orders placed or performed during the hospital encounter of 10/11/22 (from  the past 2160 hour(s))   CT Chest Abdomen Pelvis With Contrast (xpd)    Impression    1. Similar size and appearance of the cavitary right lung mass with surrounding presumed postobstructive atelectatic changes.  Increasing soft tissue fullness about the right hilum as well as interposed between the mass and sumaya.  Right paratracheal lymph nodes have mildly increased in size.  Consider PET-CT.  2. Patchy ground-glass findings within the mid and lower right lung with motion artifact limiting evaluation.  Areas of pneumonitis possible.  3. Tiny hepatic hypodensities, too small to accurately characterize.  Attention on follow-up exams.  Small volume ascites.  4. Large bowel containing left inguinal hernia without definite CT evidence of strangulation.  Note that the colon proximal to the hernia is gaseous distended.  More distal colon demonstrates normal gas and fecal material.  Correlate clinically for possible partial obstructing process.  This report was flagged in Epic as abnormal.  5. Other findings as above.      Electronically signed by: Brian Rivera MD  Date:    10/11/2022  Time:    09:55     No results found for this or any previous visit (from the past 2160 hour(s)).    No results found for this or any previous visit (from the past 2160 hour(s)).  PET CT 5/25/22:  Head/neck: Normal physiologic activity present.    Chest: 6.2 x 5.2 cm cavitary mass lesion within the right lung which extends to the right hilum and abuts the posteromedial pleura.  Lesion is FDG avid within SUV max of 35.  Adjacent atelectasis/airspace opacity present.  No other FDG avid pulmonary nodule appreciated.  Correlate with diagnostic CT chest imaging.     No hilar FDG avid lymph nodes.  No axillary FDG avid lymph nodes.  Mildly enlarged right paratracheal lymph no demonstrating low level activity maintaining fatty hilum. Subcentimeter short axis more cranial right paratracheal lymph node present demonstrating a SUV max 2.7.   Subcentimeter subcarinal lymph node with low level activity.     Bilateral gynecomastia noted.     Abdomen/pelvis: Physiologic activity without concerning FDG avid focus.     Renal cysts present.  Prostate enlargement.  Left inguinal hernia containing a portion of the sigmoid colon.  No evidence of obstruction or strangulation.     Skeletal/subcutaneous structures: No FDG avid osseous lesion.     Impression:     Large cavitary FDG avid right lung mass.  Malignancy versus infectious etiology..  Mediastinal lymph nodes as above.  Other findings as above.    Pathology:    1. AND 2. RIGHT LOWER LOBE TRANSBRONCHIAL AND BRONCHIAL BIOPSIES:   POSITIVE FOR SQUAMOUS CARCINOMA    ?   Assessment/Plan:   There are no diagnoses linked to this encounter.         No diagnosis found.            Plan:     Problem List Items Addressed This Visit    None        # Squamous Cell Carcinoma of Right Lung, Stage IIIB; [id number]   Completed definitive chemoradiation 9/2022. We reviewed restaging imaging however this is in comparison to scans end of May 2023 and suspect interval growth prior to initiation of radiation; per discussion with Radiation Oncology interval scans throughout treatment did show improvement inpatient is clinically improved.  We discussed plan for short interval imaging in 2-3 months to ensure response to therapy.      Significant improvement in pancytopenia and recovered well from respiratory symptoms.   We discussed maintenance immunotherapy discussed at length potential toxicities and signed consent with assistance of Serbian speaking phone .  Unwell currently with cold whole family.  Discussed COVID testing consideration they prefer to defer this time.  No fevers.  Recommend supportive care and consideration of COVID testing.  Will hold off on treatment re-evaluate in 2 weeks.  Labs with some improvement in leukopenia persistent thrombocytopenia, no bleeding.  Continue to monitor.    Iron deficiency anemia:   Status post IV iron therapy completed 2 doses Feraheme on 08/15/2022.  Stable anemia but will continue to trend with ongoing therapy.  Continues on p.o. iron supplementation    LUE superficial thrombosis: 8/23/22 left upper extremity Doppler showing extensive cephalic vein complete occlusion.  No DVT.  Given extensive nature of superficial thrombosis and high risk patient for progression given hypercoagulability with ongoing malignancy lung cancer and tobacco use recommend anticoagulation.  Sent rivaroxaban 10 mg daily.  Instructed him to discontinue megestrol.  Will need close monitoring for any bleeding and for improvement.  Tolerating well, complete resolution of edema.  He is completed treatment course with resolution of symptoms.  Platelet count low and recommended holding further anticoagulation.    Anorexia:  Improved.  Dronabinol not approved.  Recommend against Megace given thrombosis.  Discussed recommendation against high-dose steroid use while on immunotherapy however if low-dose dexamethasone is necessary for appetite stimulation we can use temporarily try to wean off of possible.    Hypokalemia:  Potassium in good range today continue on K supplementation 20 mEq daily    Follow-Up:   Cycle 1 durvalumab today   Revisit 4 weeks with CBC CMP TSH and cycle 2 planned, NP okay

## 2022-11-08 ENCOUNTER — DOCUMENTATION ONLY (OUTPATIENT)
Dept: PALLIATIVE MEDICINE | Facility: CLINIC | Age: 87
End: 2022-11-08
Payer: MEDICAID

## 2022-11-08 NOTE — PROGRESS NOTES
Nurse reached out pt's contact number to confirm palliative apt today, voice message left on one number, other unable to. His first apt was rescheduled to this visit, and this was a no show. Provider is aware and will reach out to referring provider with oncology.

## 2022-11-15 ENCOUNTER — DOCUMENTATION ONLY (OUTPATIENT)
Dept: PALLIATIVE MEDICINE | Facility: CLINIC | Age: 87
End: 2022-11-15
Payer: MEDICAID

## 2022-11-15 NOTE — PROGRESS NOTES
Palliative referral nurse note  Nurse reached out to pt regarding his no show apt with palliative 11-8,-22 pt's close family friend requested to cancel apt not to r/s pt is fine they will discuss with dr tejeda if there is a need for palliative services.

## 2022-11-29 ENCOUNTER — TELEPHONE (OUTPATIENT)
Dept: HEMATOLOGY/ONCOLOGY | Facility: CLINIC | Age: 87
End: 2022-11-29
Payer: MEDICAID

## 2022-11-29 NOTE — TELEPHONE ENCOUNTER
----- Message from Mireille Whitmore sent at 11/29/2022  1:58 PM CST -----  Contact: Bhavya Latif called to speak to office in regards to patient's upcoming appointment. Please callback at [phone].

## 2022-11-30 ENCOUNTER — TELEPHONE (OUTPATIENT)
Dept: HEMATOLOGY/ONCOLOGY | Facility: CLINIC | Age: 87
End: 2022-11-30
Payer: MEDICAID

## 2022-11-30 NOTE — TELEPHONE ENCOUNTER
Best Contact : Bhavya  Number: [phone]    Bhavya is stating pt is not wanting to do treatment anymore. Wanting to discuss possibly going on hospice, pt has no energy, he can barely walk, he's not eating. Pt unable to come in office or do a vv. Bhavya is wanting to know what to do.  Message sent to  staff

## 2022-12-02 ENCOUNTER — TELEPHONE (OUTPATIENT)
Dept: HEMATOLOGY/ONCOLOGY | Facility: CLINIC | Age: 87
End: 2022-12-02

## 2022-12-02 ENCOUNTER — LAB VISIT (OUTPATIENT)
Dept: LAB | Facility: HOSPITAL | Age: 87
End: 2022-12-02
Attending: INTERNAL MEDICINE
Payer: MEDICAID

## 2022-12-02 ENCOUNTER — DOCUMENTATION ONLY (OUTPATIENT)
Dept: HEMATOLOGY/ONCOLOGY | Facility: CLINIC | Age: 87
End: 2022-12-02

## 2022-12-02 ENCOUNTER — SOCIAL WORK (OUTPATIENT)
Dept: HEMATOLOGY/ONCOLOGY | Facility: CLINIC | Age: 87
End: 2022-12-02

## 2022-12-02 ENCOUNTER — OFFICE VISIT (OUTPATIENT)
Dept: HEMATOLOGY/ONCOLOGY | Facility: CLINIC | Age: 87
End: 2022-12-02
Payer: MEDICAID

## 2022-12-02 VITALS
WEIGHT: 85.75 LBS | HEIGHT: 64 IN | TEMPERATURE: 97 F | HEART RATE: 95 BPM | SYSTOLIC BLOOD PRESSURE: 101 MMHG | BODY MASS INDEX: 14.64 KG/M2 | DIASTOLIC BLOOD PRESSURE: 47 MMHG | OXYGEN SATURATION: 95 %

## 2022-12-02 DIAGNOSIS — C34.91 SQUAMOUS CELL CARCINOMA OF RIGHT LUNG: ICD-10-CM

## 2022-12-02 DIAGNOSIS — C34.91 SQUAMOUS CELL CARCINOMA OF RIGHT LUNG: Primary | ICD-10-CM

## 2022-12-02 DIAGNOSIS — E87.6 HYPOKALEMIA: ICD-10-CM

## 2022-12-02 DIAGNOSIS — F32.A DEPRESSION, UNSPECIFIED DEPRESSION TYPE: Primary | ICD-10-CM

## 2022-12-02 DIAGNOSIS — R63.4 WEIGHT LOSS: ICD-10-CM

## 2022-12-02 DIAGNOSIS — R63.0 LOSS OF APPETITE: ICD-10-CM

## 2022-12-02 DIAGNOSIS — R63.0 ANOREXIA: ICD-10-CM

## 2022-12-02 LAB
ALBUMIN SERPL BCP-MCNC: 2.2 G/DL (ref 3.5–5.2)
ALP SERPL-CCNC: 94 U/L (ref 55–135)
ALT SERPL W/O P-5'-P-CCNC: <5 U/L (ref 10–44)
ANION GAP SERPL CALC-SCNC: 13 MMOL/L (ref 8–16)
AST SERPL-CCNC: 10 U/L (ref 10–40)
BILIRUB SERPL-MCNC: 0.5 MG/DL (ref 0.1–1)
BUN SERPL-MCNC: 15 MG/DL (ref 8–23)
CALCIUM SERPL-MCNC: 9 MG/DL (ref 8.7–10.5)
CHLORIDE SERPL-SCNC: 103 MMOL/L (ref 95–110)
CO2 SERPL-SCNC: 28 MMOL/L (ref 23–29)
CREAT SERPL-MCNC: 0.7 MG/DL (ref 0.5–1.4)
ERYTHROCYTE [DISTWIDTH] IN BLOOD BY AUTOMATED COUNT: 15.1 % (ref 11.5–14.5)
EST. GFR  (NO RACE VARIABLE): >60 ML/MIN/1.73 M^2
GLUCOSE SERPL-MCNC: 116 MG/DL (ref 70–110)
HCT VFR BLD AUTO: 31.9 % (ref 40–54)
HGB BLD-MCNC: 9.8 G/DL (ref 14–18)
IMM GRANULOCYTES # BLD AUTO: 0.02 K/UL (ref 0–0.04)
MCH RBC QN AUTO: 31.8 PG (ref 27–31)
MCHC RBC AUTO-ENTMCNC: 30.7 G/DL (ref 32–36)
MCV RBC AUTO: 104 FL (ref 82–98)
NEUTROPHILS # BLD AUTO: 5 K/UL (ref 1.8–7.7)
PLATELET # BLD AUTO: 107 K/UL (ref 150–450)
PMV BLD AUTO: 9.1 FL (ref 9.2–12.9)
POTASSIUM SERPL-SCNC: 3.3 MMOL/L (ref 3.5–5.1)
PROT SERPL-MCNC: 6.9 G/DL (ref 6–8.4)
RBC # BLD AUTO: 3.08 M/UL (ref 4.6–6.2)
SODIUM SERPL-SCNC: 144 MMOL/L (ref 136–145)
TSH SERPL DL<=0.005 MIU/L-ACNC: 0.72 UIU/ML (ref 0.4–4)
WBC # BLD AUTO: 6.21 K/UL (ref 3.9–12.7)

## 2022-12-02 PROCEDURE — 36415 COLL VENOUS BLD VENIPUNCTURE: CPT | Performed by: INTERNAL MEDICINE

## 2022-12-02 PROCEDURE — 85027 COMPLETE CBC AUTOMATED: CPT | Performed by: INTERNAL MEDICINE

## 2022-12-02 PROCEDURE — 1101F PT FALLS ASSESS-DOCD LE1/YR: CPT | Mod: CPTII,,, | Performed by: INTERNAL MEDICINE

## 2022-12-02 PROCEDURE — 80053 COMPREHEN METABOLIC PANEL: CPT | Performed by: INTERNAL MEDICINE

## 2022-12-02 PROCEDURE — 1159F PR MEDICATION LIST DOCUMENTED IN MEDICAL RECORD: ICD-10-PCS | Mod: CPTII,,, | Performed by: INTERNAL MEDICINE

## 2022-12-02 PROCEDURE — 99215 PR OFFICE/OUTPT VISIT, EST, LEVL V, 40-54 MIN: ICD-10-PCS | Mod: S$PBB,,, | Performed by: INTERNAL MEDICINE

## 2022-12-02 PROCEDURE — 99999 PR PBB SHADOW E&M-EST. PATIENT-LVL III: CPT | Mod: PBBFAC,,, | Performed by: INTERNAL MEDICINE

## 2022-12-02 PROCEDURE — 1101F PR PT FALLS ASSESS DOC 0-1 FALLS W/OUT INJ PAST YR: ICD-10-PCS | Mod: CPTII,,, | Performed by: INTERNAL MEDICINE

## 2022-12-02 PROCEDURE — 99213 OFFICE O/P EST LOW 20 MIN: CPT | Mod: PBBFAC | Performed by: INTERNAL MEDICINE

## 2022-12-02 PROCEDURE — 3288F PR FALLS RISK ASSESSMENT DOCUMENTED: ICD-10-PCS | Mod: CPTII,,, | Performed by: INTERNAL MEDICINE

## 2022-12-02 PROCEDURE — 84443 ASSAY THYROID STIM HORMONE: CPT | Performed by: INTERNAL MEDICINE

## 2022-12-02 PROCEDURE — 99215 OFFICE O/P EST HI 40 MIN: CPT | Mod: S$PBB,,, | Performed by: INTERNAL MEDICINE

## 2022-12-02 PROCEDURE — 99999 PR PBB SHADOW E&M-EST. PATIENT-LVL III: ICD-10-PCS | Mod: PBBFAC,,, | Performed by: INTERNAL MEDICINE

## 2022-12-02 PROCEDURE — 3288F FALL RISK ASSESSMENT DOCD: CPT | Mod: CPTII,,, | Performed by: INTERNAL MEDICINE

## 2022-12-02 PROCEDURE — 1159F MED LIST DOCD IN RCRD: CPT | Mod: CPTII,,, | Performed by: INTERNAL MEDICINE

## 2022-12-02 RX ORDER — POTASSIUM CHLORIDE 20 MEQ/1
20 TABLET, EXTENDED RELEASE ORAL DAILY
Qty: 30 TABLET | Refills: 2 | Status: SHIPPED | OUTPATIENT
Start: 2022-12-02

## 2022-12-02 RX ORDER — MIRTAZAPINE 7.5 MG/1
7.5 TABLET, FILM COATED ORAL NIGHTLY
Qty: 30 TABLET | Refills: 11 | Status: SHIPPED | OUTPATIENT
Start: 2022-12-02 | End: 2023-12-02

## 2022-12-02 NOTE — PROGRESS NOTES
Subjective:      DATE OF VISIT: 12/2/22     ?  Patient ID:?Real Pagan is a 87 y.o. male.?? MR#: [id number]   ?   PRIMARY ONCOLOGIST: Dr. Paiz    ? Primary Care Providers:  Shruthi Burr MD, MD (General)     CHIEF COMPLAINT: ?Follow-up     ONCOLOGIC DIAGNOSIS: Right lung squamous cell carcinoma [id number], stage IIIB  ?   CURRENT TREATMENT:  Prior durvalumab on hold, see below  PAST TREATMENT: Biopsy  ? concurrent chemoradiation with carboplatin paclitaxel, Completed 09/20/2022     Interval events:    Note Palauan speaking  offered but refused by patient and family. They all have preferred family to translate.     Severe anorexia further weight loss and depressive symptoms angry at caregivers.  Bruising on forearms.  Not amenable to additional testing lab work for coagulation labs.  He is not on aspirin other blood thinners or over-the-counter pain medications/NSAID.  Non amenable to home health helpers or nutrition consult limited appetite and particular about what he eats.    He had 1 dose of durvalumab several weeks ago.        ONCOLOGIC HISTORY:   5/6/22: Pt presented to PCP with complaints of unintentional weight loss, fatigue, non-productive cough, dyspnea on exertion, and shortness of breath. CXR demonstrated cavitary mass-like density seen projecting over the right hilar region.    5/24/22: PET CT demonstrated 6.2cm cavitary mass in right lung extending to right hilum and abutting pleura, avid max SUV 35, mildly enlarged right paratracheal node low level uptake max SUV 2.7, no distant metastases noted.    7/1/22: bronchoscopy - right lower lobe transbronchial and bronchial biopsies positive for squamous cell carcinoma  ?   Oncology History   Squamous cell carcinoma of right lung   7/11/2022 Initial Diagnosis    Squamous cell carcinoma of right lung     7/18/2022 Cancer Staged    Staging form: Lung, AJCC 8th Edition  - Clinical stage from 7/18/2022: Stage IIIB (cT3, cN2,  cM0)       8/9/2022 - 9/20/2022 Radiation Therapy    Treating physician: Romina  Total Dose: 60 Gy  Fractions: 30     8/10/2022 - 9/22/2022 Chemotherapy    Treatment Summary   Plan Name: OP NSCLC PACLITAXEL + CARBOPLATIN (AUC) QW + RADIATION  Treatment Goal: Curative  Status: Inactive  Start Date: 8/10/2022  End Date: 9/22/2022  Provider: Lindsay Paiz MD  Chemotherapy: CARBOplatin (PARAPLATIN) 155 mg in sodium chloride 0.9% 265.5 mL chemo infusion, 155 mg (100 % of original dose 155.6 mg), Intravenous, Clinic/HOD 1 time, 5 of 6 cycles  Dose modification:   (original dose 155.6 mg, Cycle 1),   (original dose 155.6 mg, Cycle 2),   (original dose 155.6 mg, Cycle 3)  Administration: 155 mg (8/10/2022), 155 mg (8/17/2022), 155 mg (8/24/2022), 140 mg (9/22/2022), 155 mg (9/15/2022)  PACLitaxeL (TAXOL) 45 mg/m2 = 66 mg in sodium chloride 0.9% 100 mL chemo infusion, 45 mg/m2 = 66 mg, Intravenous, Clinic/HOD 1 time, 5 of 6 cycles  Administration: 66 mg (8/10/2022), 66 mg (8/17/2022), 66 mg (8/24/2022), 66 mg (9/15/2022), 66 mg (9/22/2022)       11/3/2022 -  Chemotherapy    Treatment Summary   Plan Name: OP DURVALUMAB 1500 MG Q4W  Treatment Goal: Curative  Status: Active  Start Date: 11/3/2022  End Date: 10/5/2023 (Planned)  Provider: Lindsay Paiz MD  Chemotherapy: [No matching medication found in this treatment plan]         Cancer Staging  Right lung squamous cell carcinoma [id number], stage IIIB      Objective:      Vitals:    12/02/22 1112   BP: (!) 101/47   Pulse: 95   Temp: 97.2 °F (36.2 °C)      ?   ECOG:?2  General appearance: Generally well appearing, in no acute distress.   Head, eyes, ears, nose, and throat: moist mucous membranes.   Respiratory:  Normal work of breathing  Abdomen: cachectic, nontender, nondistended.   Extremities: Warm, without edema.   Neurologic: Alert and oriented. Grossly normal strength, coordination, and gait.   Skin: No rashes, ecchymoses or petechial lesion.   Psychiatric:   Normal mood and affect.    ?   Laboratory:  ?   Lab Visit on 12/02/2022   Component Date Value Ref Range Status    WBC 12/02/2022 6.21  3.90 - 12.70 K/uL Final    RBC 12/02/2022 3.08 (L)  4.60 - 6.20 M/uL Final    Hemoglobin 12/02/2022 9.8 (L)  14.0 - 18.0 g/dL Final    Hematocrit 12/02/2022 31.9 (L)  40.0 - 54.0 % Final    MCV 12/02/2022 104 (H)  82 - 98 fL Final    MCH 12/02/2022 31.8 (H)  27.0 - 31.0 pg Final    MCHC 12/02/2022 30.7 (L)  32.0 - 36.0 g/dL Final    RDW 12/02/2022 15.1 (H)  11.5 - 14.5 % Final    Platelets 12/02/2022 107 (L)  150 - 450 K/uL Final    MPV 12/02/2022 9.1 (L)  9.2 - 12.9 fL Final    Gran # (ANC) 12/02/2022 5.0  1.8 - 7.7 K/uL Final    Immature Grans (Abs) 12/02/2022 0.02  0.00 - 0.04 K/uL Final       Imaging:    Results for orders placed or performed during the hospital encounter of 10/11/22 (from the past 2160 hour(s))   CT Chest Abdomen Pelvis With Contrast (xpd)    Impression    1. Similar size and appearance of the cavitary right lung mass with surrounding presumed postobstructive atelectatic changes.  Increasing soft tissue fullness about the right hilum as well as interposed between the mass and sumaya.  Right paratracheal lymph nodes have mildly increased in size.  Consider PET-CT.  2. Patchy ground-glass findings within the mid and lower right lung with motion artifact limiting evaluation.  Areas of pneumonitis possible.  3. Tiny hepatic hypodensities, too small to accurately characterize.  Attention on follow-up exams.  Small volume ascites.  4. Large bowel containing left inguinal hernia without definite CT evidence of strangulation.  Note that the colon proximal to the hernia is gaseous distended.  More distal colon demonstrates normal gas and fecal material.  Correlate clinically for possible partial obstructing process.  This report was flagged in Epic as abnormal.  5. Other findings as above.      Electronically signed by: Brian Rivera,  MD  Date:    10/11/2022  Time:    09:55     No results found for this or any previous visit (from the past 2160 hour(s)).    No results found for this or any previous visit (from the past 2160 hour(s)).  PET CT 5/25/22:  Head/neck: Normal physiologic activity present.    Chest: 6.2 x 5.2 cm cavitary mass lesion within the right lung which extends to the right hilum and abuts the posteromedial pleura.  Lesion is FDG avid within SUV max of 35.  Adjacent atelectasis/airspace opacity present.  No other FDG avid pulmonary nodule appreciated.  Correlate with diagnostic CT chest imaging.     No hilar FDG avid lymph nodes.  No axillary FDG avid lymph nodes.  Mildly enlarged right paratracheal lymph no demonstrating low level activity maintaining fatty hilum. Subcentimeter short axis more cranial right paratracheal lymph node present demonstrating a SUV max 2.7.  Subcentimeter subcarinal lymph node with low level activity.     Bilateral gynecomastia noted.     Abdomen/pelvis: Physiologic activity without concerning FDG avid focus.     Renal cysts present.  Prostate enlargement.  Left inguinal hernia containing a portion of the sigmoid colon.  No evidence of obstruction or strangulation.     Skeletal/subcutaneous structures: No FDG avid osseous lesion.     Impression:     Large cavitary FDG avid right lung mass.  Malignancy versus infectious etiology..  Mediastinal lymph nodes as above.  Other findings as above.    Pathology:    1. AND 2. RIGHT LOWER LOBE TRANSBRONCHIAL AND BRONCHIAL BIOPSIES:   POSITIVE FOR SQUAMOUS CARCINOMA    ?   Assessment/Plan:   There are no diagnoses linked to this encounter.         No diagnosis found.            Plan:     Problem List Items Addressed This Visit    None        # Squamous Cell Carcinoma of Right Lung, Stage IIIB; [id number]   Completed definitive chemoradiation 9/2022. We reviewed restaging imaging however this is in comparison to scans end of May 2023 and suspect interval growth prior  to initiation of radiation; per discussion with Radiation Oncology interval scans throughout treatment did show improvement inpatient is clinically improved.  We discussed plan for short interval imaging in 2-3 months to ensure response to therapy.      Had been on surveillance with durvalumab however due to clinical decline will hold further treatments and provide supportive care per below.  Discussed risks and benefits and he is understanding of this and amenable to no further durvalumab treatments.  Will get restaging scans given severe anorexia and weight loss to determine if any malignancy recurrent concerns contributing to this.    Iron deficiency anemia:  Status post IV iron therapy completed 2 doses Feraheme on 08/15/2022.  Stable anemia but will continue to trend with ongoing therapy.  Continues on p.o. iron supplementation    LUE superficial thrombosis: 8/23/22 left upper extremity Doppler showing extensive cephalic vein complete occlusion.  No DVT.  Given extensive nature of superficial thrombosis and high risk patient for progression given hypercoagulability with ongoing malignancy lung cancer and tobacco use recommend anticoagulation.  Sent rivaroxaban 10 mg daily.  Instructed him to discontinue megestrol.  Will need close monitoring for any bleeding and for improvement.  Tolerating well, complete resolution of edema.  He is completed treatment course with resolution of symptoms.  Platelet count low and recommended holding further anticoagulation.    Anorexia:  Progressive.  Will get repeat scans per above, with concomitant depression may be contributing.  Had been on dexamethasone low-dose without improvement.  Will add mirtazapine.      Hypokalemia:  Hypokalemia on labs and will need to restart supplement 20 mEq daily sent to pharmacy    Follow-Up:     Route Chart for Scheduling    Med Onc Chart Routing      Follow up with physician 2 weeks.   Follow up with ALVERTO    Infusion scheduling note     Injection scheduling note    Labs CBC and CMP   Lab interval:     Imaging CT chest abdomen pelvis   prior to MD visit in 2-3 wks   Pharmacy appointment    Other referrals        Treatment Plan Information   OP DURVALUMAB 1500 MG Q4W   Lindsay Paiz MD   Upcoming Treatment Dates - OP DURVALUMAB 1500 MG Q4W    12/1/2022       Chemotherapy       durvalumab (IMFINZI) 1,500 mg in sodium chloride 0.9% 280 mL chemo infusion  12/29/2022       Chemotherapy       durvalumab (IMFINZI) 1,500 mg in sodium chloride 0.9% 280 mL chemo infusion  1/26/2023       Chemotherapy       durvalumab (IMFINZI) 1,500 mg in sodium chloride 0.9% 280 mL chemo infusion  2/23/2023       Chemotherapy       durvalumab (IMFINZI) 1,500 mg in sodium chloride 0.9% 280 mL chemo infusion    Supportive Plan Information  OP FERUMOXYTOL   Lindsay Paiz MD   Upcoming Treatment Dates - OP FERUMOXYTOL    No upcoming days in selected categories.

## 2022-12-02 NOTE — PROGRESS NOTES
SW consulted for DME needs (sanjay). SW placed order for MD signature, and will fax to DME provider once signed. SW will remain available.   Lucie Yoo LMSW

## 2022-12-02 NOTE — TELEPHONE ENCOUNTER
Contacted pt's daughter and informed her of upcoming appt dates and times for CT, labs, and MD visit. She verbalized understanding.

## 2022-12-02 NOTE — PROGRESS NOTES
JARRETT faxed DME orders for a walker to [facility]. P. [phone], F. [phone]. SW will remain available. Lucie Yoo LMSW

## 2022-12-05 ENCOUNTER — TELEPHONE (OUTPATIENT)
Dept: HEMATOLOGY/ONCOLOGY | Facility: CLINIC | Age: 87
End: 2022-12-05
Payer: MEDICAID

## 2022-12-05 NOTE — TELEPHONE ENCOUNTER
JARRETT spoke with Araseli at [facility]. Araseli confirmed that orders were received, but she will have to verify insurance and confirm if they can accept pt's Medicaid, and she will send JARRETT a message with outcome. Lucie Yoo LMSW

## 2022-12-06 ENCOUNTER — DOCUMENTATION ONLY (OUTPATIENT)
Dept: HEMATOLOGY/ONCOLOGY | Facility: CLINIC | Age: 87
End: 2022-12-06
Payer: MEDICAID

## 2022-12-06 ENCOUNTER — TELEPHONE (OUTPATIENT)
Dept: HEMATOLOGY/ONCOLOGY | Facility: CLINIC | Age: 87
End: 2022-12-06
Payer: MEDICAID

## 2022-12-06 ENCOUNTER — SOCIAL WORK (OUTPATIENT)
Dept: HEMATOLOGY/ONCOLOGY | Facility: CLINIC | Age: 87
End: 2022-12-06
Payer: MEDICAID

## 2022-12-06 DIAGNOSIS — C34.91 SQUAMOUS CELL CARCINOMA OF RIGHT LUNG: Primary | ICD-10-CM

## 2022-12-06 NOTE — PROGRESS NOTES
SW consulted to provide rollator instead of standard walker. New orders faxed to [facility] at [phone], since previous orders were sent there, but [facility] is currently outsourcing new Medicaid referrals per SW call to Bradley ojeda am. SW will look for other providers if [facility] is not able to assist/provide rollator. SW will continue follow up until completed. SW will remain available. Lucie Yoo LMSW

## 2022-12-06 NOTE — TELEPHONE ENCOUNTER
Ms. Latif calling about patient's walker.  Left message letting her know that the SW is working on getting it for him.  They are in the process of checking his insurance, and someone should be calling them soon.

## 2022-12-06 NOTE — TELEPHONE ENCOUNTER
JARRETT spoke with Bradley re: DME order for walker. Per Bradley, they are outsourcing Medicaid referrals right now, and someone will follow up with JARRETT to confirm where referral was sent. JARRETT will remain available. Lucie Yoo LMSW

## 2022-12-06 NOTE — PROGRESS NOTES
SW consulted to provide rollator instead of standard walker. SW will place orders for MD signature for correct DME. SW will remain available. Lucie Yoo LMSW

## 2022-12-07 ENCOUNTER — TELEPHONE (OUTPATIENT)
Dept: HEMATOLOGY/ONCOLOGY | Facility: CLINIC | Age: 87
End: 2022-12-07
Payer: MEDICAID

## 2022-12-07 NOTE — TELEPHONE ENCOUNTER
JARRETT faxed DME orders for walker to [facility] due to [facility] not completing orders due to pt having NAN. Per Kasie at [facility], Medicaid does not cover a rollator, so JARRETT faxed orders for a regular walker.   p. [phone], f. [phone]. JARRETT will continue follow up until order is completed. Lucie Yoo LMSW

## 2022-12-08 ENCOUNTER — TELEPHONE (OUTPATIENT)
Dept: HEMATOLOGY/ONCOLOGY | Facility: CLINIC | Age: 87
End: 2022-12-08
Payer: MEDICAID

## 2022-12-08 NOTE — TELEPHONE ENCOUNTER
Pt's dtr Opal returned JARRETT's call. Dtr stated she received a call from Valentina with [facility], stating that walker will be delivered on Friday of this week. JARRETT attempted to call [facility] [phone] to inform of the above, and to make sure they don't duplicate orders, JARRETT left vm. JARRETT also attempted to call [facility] .[phone] to confirm the above, but no answer. JARRETT will continue follow up until dtr confirms that pt has received. Lucie Yoo LMSW

## 2022-12-09 ENCOUNTER — SOCIAL WORK (OUTPATIENT)
Dept: HEMATOLOGY/ONCOLOGY | Facility: CLINIC | Age: 87
End: 2022-12-09
Payer: MEDICAID

## 2022-12-09 ENCOUNTER — TELEPHONE (OUTPATIENT)
Dept: HEMATOLOGY/ONCOLOGY | Facility: CLINIC | Age: 87
End: 2022-12-09
Payer: MEDICAID

## 2022-12-09 ENCOUNTER — TELEPHONE (OUTPATIENT)
Dept: UROLOGY | Facility: CLINIC | Age: 87
End: 2022-12-09
Payer: MEDICAID

## 2022-12-09 DIAGNOSIS — C34.91 SQUAMOUS CELL CARCINOMA OF RIGHT LUNG: Primary | ICD-10-CM

## 2022-12-09 NOTE — TELEPHONE ENCOUNTER
Selin will call patient and explain to him that YARA Saavedra is not her on Mondays he will be in [place]     ----- Message from Bart Tidwell sent at 12/9/2022 10:26 AM CST -----  Contact: Lucie /[facility]  Lucie is calling in regards to see if the patient can move his appointment from 12/9 to 12/12 due to he have appointment at [facility] on that day. Please call his daughter at [phone]      Thanks  CF

## 2022-12-09 NOTE — TELEPHONE ENCOUNTER
SW spoke with dtr-who stated that she's not sure if walker will be delivered today because she hasn't heard anything. Dtr also confirmed that pt/family does not want any home care services at this time but will readdress with MD during appt next week. Dtr also stated that pt will not be able to make urology appt today because they forgot about it and asked if it can be rescheduled for 12.12.12 when he's at the [place]. JARRETT spoke with  Bart at [phone], who sent message to provider, and stated that urology office will follow up with dtr. SW will continue follow up with dtr re: walker and will remain available. Lucie Yoo LMSW

## 2022-12-09 NOTE — PROGRESS NOTES
SW placing order for a rollator as previous orders were the same for a standard walker, per [facility] (Bisi Jenkins). [facility] is now accepting Medicaid again starting today, and is stating that Medicaid will cover a rollator, but [facility] stated that Medicaid would not, so sending orders back to [facility]. JARRETT called [facility] x's 2 days, but no answer. Sorry for the duplication. Lucie Yoo LMSW

## 2022-12-12 ENCOUNTER — HOSPITAL ENCOUNTER (OUTPATIENT)
Dept: RADIOLOGY | Facility: HOSPITAL | Age: 87
Discharge: HOME | End: 2022-12-12
Attending: INTERNAL MEDICINE
Payer: MEDICAID

## 2022-12-12 DIAGNOSIS — F32.A DEPRESSION, UNSPECIFIED DEPRESSION TYPE: ICD-10-CM

## 2022-12-12 DIAGNOSIS — R63.0 LOSS OF APPETITE: ICD-10-CM

## 2022-12-12 DIAGNOSIS — R63.4 WEIGHT LOSS: ICD-10-CM

## 2022-12-12 PROCEDURE — 71260 CT THORAX DX C+: CPT | Mod: TC

## 2022-12-12 PROCEDURE — 74177 CT ABD & PELVIS W/CONTRAST: CPT | Mod: 26,,, | Performed by: STUDENT IN AN ORGANIZED HEALTH CARE EDUCATION/TRAINING PROGRAM

## 2022-12-12 PROCEDURE — 71260 CT THORAX DX C+: CPT | Mod: 26,,, | Performed by: STUDENT IN AN ORGANIZED HEALTH CARE EDUCATION/TRAINING PROGRAM

## 2022-12-12 PROCEDURE — 25500020 PHARM REV CODE 255: Performed by: INTERNAL MEDICINE

## 2022-12-12 PROCEDURE — 74177 CT ABD & PELVIS W/CONTRAST: CPT | Mod: TC

## 2022-12-12 PROCEDURE — 71260 CT CHEST ABDOMEN PELVIS WITH CONTRAST (XPD): ICD-10-PCS | Mod: 26,,, | Performed by: STUDENT IN AN ORGANIZED HEALTH CARE EDUCATION/TRAINING PROGRAM

## 2022-12-12 PROCEDURE — 74177 CT CHEST ABDOMEN PELVIS WITH CONTRAST (XPD): ICD-10-PCS | Mod: 26,,, | Performed by: STUDENT IN AN ORGANIZED HEALTH CARE EDUCATION/TRAINING PROGRAM

## 2022-12-12 RX ADMIN — IOHEXOL 75 ML: 350 INJECTION, SOLUTION INTRAVENOUS at 02:12

## 2022-12-12 RX ADMIN — IOHEXOL 30 ML: 350 INJECTION, SOLUTION INTRAVENOUS at 02:12

## 2022-12-13 ENCOUNTER — TELEPHONE (OUTPATIENT)
Dept: HEMATOLOGY/ONCOLOGY | Facility: CLINIC | Age: 87
End: 2022-12-13
Payer: MEDICAID

## 2022-12-13 ENCOUNTER — DOCUMENTATION ONLY (OUTPATIENT)
Dept: HEMATOLOGY/ONCOLOGY | Facility: CLINIC | Age: 87
End: 2022-12-13
Payer: MEDICAID

## 2022-12-13 NOTE — PROGRESS NOTES
JARRETT received message from Bisi with [facility] stating that pt's rollator will be delivered on tomorrow. SW will call family to inform/confirm the above. SW will remain available. Lucie Yoo LMSW

## 2022-12-13 NOTE — TELEPHONE ENCOUNTER
JARRETT follow up re: rollator. Per adalbertorMiles Joseph, [facility] called her sister Le and arranged for delivery on today, but SW was informed that delivery is set for tomorrow. Pt has an MD appt tomorrow at 3pm and will not be home after 2 pm. JARRETT sent message to Bisi with [facility] informing of the above. Per Bisi, pt's family was advised that  will call 30-40 minutes prior to delivery, but not able to give a specific delivery time for tomorrow. JARRETT informed dtrMiles Joseph of the above, and she will call sister to coordinate.  Dtr inquired about assistance with adult briefs. SW advised dtr to call [facility] [place] [facility] for resources. Dtr confirmed that she already has numbers for the above. Dtr will call SW if additional needs arise. SW will remain available. Lucie Yoo LMSW

## 2022-12-14 ENCOUNTER — TELEPHONE (OUTPATIENT)
Dept: HEMATOLOGY/ONCOLOGY | Facility: CLINIC | Age: 87
End: 2022-12-14
Payer: MEDICAID

## 2022-12-14 ENCOUNTER — DOCUMENTATION ONLY (OUTPATIENT)
Dept: HEMATOLOGY/ONCOLOGY | Facility: CLINIC | Age: 87
End: 2022-12-14
Payer: MEDICAID

## 2022-12-14 NOTE — PROGRESS NOTES
JARRETT confirmed with Bisi Jenkins with [facility] via Teams that rollator was delivered to pt. SW will remain available. Lucie Yoo LMSW

## 2022-12-15 ENCOUNTER — TELEPHONE (OUTPATIENT)
Dept: HEMATOLOGY/ONCOLOGY | Facility: CLINIC | Age: 87
End: 2022-12-15

## 2022-12-15 ENCOUNTER — OFFICE VISIT (OUTPATIENT)
Dept: HEMATOLOGY/ONCOLOGY | Facility: CLINIC | Age: 87
End: 2022-12-15
Payer: MEDICAID

## 2022-12-15 VITALS
WEIGHT: 80.44 LBS | BODY MASS INDEX: 13.73 KG/M2 | TEMPERATURE: 98 F | OXYGEN SATURATION: 98 % | DIASTOLIC BLOOD PRESSURE: 85 MMHG | HEART RATE: 85 BPM | SYSTOLIC BLOOD PRESSURE: 151 MMHG | HEIGHT: 64 IN

## 2022-12-15 DIAGNOSIS — C34.91 SQUAMOUS CELL CARCINOMA OF RIGHT LUNG: Primary | ICD-10-CM

## 2022-12-15 PROCEDURE — 1101F PR PT FALLS ASSESS DOC 0-1 FALLS W/OUT INJ PAST YR: ICD-10-PCS | Mod: CPTII,,, | Performed by: INTERNAL MEDICINE

## 2022-12-15 PROCEDURE — 1101F PT FALLS ASSESS-DOCD LE1/YR: CPT | Mod: CPTII,,, | Performed by: INTERNAL MEDICINE

## 2022-12-15 PROCEDURE — 1125F PR PAIN SEVERITY QUANTIFIED, PAIN PRESENT: ICD-10-PCS | Mod: CPTII,,, | Performed by: INTERNAL MEDICINE

## 2022-12-15 PROCEDURE — 99214 OFFICE O/P EST MOD 30 MIN: CPT | Mod: PBBFAC | Performed by: INTERNAL MEDICINE

## 2022-12-15 PROCEDURE — 99215 PR OFFICE/OUTPT VISIT, EST, LEVL V, 40-54 MIN: ICD-10-PCS | Mod: S$PBB,,, | Performed by: INTERNAL MEDICINE

## 2022-12-15 PROCEDURE — 99215 OFFICE O/P EST HI 40 MIN: CPT | Mod: S$PBB,,, | Performed by: INTERNAL MEDICINE

## 2022-12-15 PROCEDURE — 1159F PR MEDICATION LIST DOCUMENTED IN MEDICAL RECORD: ICD-10-PCS | Mod: CPTII,,, | Performed by: INTERNAL MEDICINE

## 2022-12-15 PROCEDURE — 99999 PR PBB SHADOW E&M-EST. PATIENT-LVL IV: ICD-10-PCS | Mod: PBBFAC,,, | Performed by: INTERNAL MEDICINE

## 2022-12-15 PROCEDURE — 1125F AMNT PAIN NOTED PAIN PRSNT: CPT | Mod: CPTII,,, | Performed by: INTERNAL MEDICINE

## 2022-12-15 PROCEDURE — 1159F MED LIST DOCD IN RCRD: CPT | Mod: CPTII,,, | Performed by: INTERNAL MEDICINE

## 2022-12-15 PROCEDURE — 3288F PR FALLS RISK ASSESSMENT DOCUMENTED: ICD-10-PCS | Mod: CPTII,,, | Performed by: INTERNAL MEDICINE

## 2022-12-15 PROCEDURE — 99999 PR PBB SHADOW E&M-EST. PATIENT-LVL IV: CPT | Mod: PBBFAC,,, | Performed by: INTERNAL MEDICINE

## 2022-12-15 PROCEDURE — 1160F RVW MEDS BY RX/DR IN RCRD: CPT | Mod: CPTII,,, | Performed by: INTERNAL MEDICINE

## 2022-12-15 PROCEDURE — 3288F FALL RISK ASSESSMENT DOCD: CPT | Mod: CPTII,,, | Performed by: INTERNAL MEDICINE

## 2022-12-15 PROCEDURE — 1160F PR REVIEW ALL MEDS BY PRESCRIBER/CLIN PHARMACIST DOCUMENTED: ICD-10-PCS | Mod: CPTII,,, | Performed by: INTERNAL MEDICINE

## 2022-12-15 NOTE — PROGRESS NOTES
Subjective:      DATE OF VISIT: 12/15/22     ?  Patient ID:?Real Pagan is a 87 y.o. male.?? MR#: [id number]   ?   PRIMARY ONCOLOGIST: Dr. Paiz    ? Primary Care Providers:  Shruthi Burr MD, MD (General)     CHIEF COMPLAINT: ?Follow-up     ONCOLOGIC DIAGNOSIS: Right lung squamous cell carcinoma [id number], stage IIIB  ?   CURRENT TREATMENT:  Prior durvalumab on hold, see below  PAST TREATMENT: Biopsy  ? concurrent chemoradiation with carboplatin paclitaxel, Completed 09/20/2022     Interval events:     Patient has had further decline in functional status diminished appetite poor p.o. intake and dyspnea.  We had restaging scans which we reviewed today.  We are assisted by Filipino-speaking  over phone.    ONCOLOGIC HISTORY:   5/6/22: Pt presented to PCP with complaints of unintentional weight loss, fatigue, non-productive cough, dyspnea on exertion, and shortness of breath. CXR demonstrated cavitary mass-like density seen projecting over the right hilar region.    5/24/22: PET CT demonstrated 6.2cm cavitary mass in right lung extending to right hilum and abutting pleura, avid max SUV 35, mildly enlarged right paratracheal node low level uptake max SUV 2.7, no distant metastases noted.    7/1/22: bronchoscopy - right lower lobe transbronchial and bronchial biopsies positive for squamous cell carcinoma  ?   Oncology History   Squamous cell carcinoma of right lung   7/11/2022 Initial Diagnosis    Squamous cell carcinoma of right lung     7/18/2022 Cancer Staged    Staging form: Lung, AJCC 8th Edition  - Clinical stage from 7/18/2022: Stage IIIB (cT3, cN2, cM0)       8/9/2022 - 9/20/2022 Radiation Therapy    Treating physician: Romina  Total Dose: 60 Gy  Fractions: 30     8/10/2022 - 9/22/2022 Chemotherapy    Treatment Summary   Plan Name: OP NSCLC PACLITAXEL + CARBOPLATIN (AUC) QW + RADIATION  Treatment Goal: Curative  Status: Inactive  Start Date: 8/10/2022  End Date:  9/22/2022  Provider: Lindsay Paiz MD  Chemotherapy: CARBOplatin (PARAPLATIN) 155 mg in sodium chloride 0.9% 265.5 mL chemo infusion, 155 mg (100 % of original dose 155.6 mg), Intravenous, Clinic/[facility] 1 time, 5 of 6 cycles  Dose modification:   (original dose 155.6 mg, Cycle 1),   (original dose 155.6 mg, Cycle 2),   (original dose 155.6 mg, Cycle 3)  Administration: 155 mg (8/10/2022), 155 mg (8/17/2022), 155 mg (8/24/2022), 140 mg (9/22/2022), 155 mg (9/15/2022)  PACLitaxeL (TAXOL) 45 mg/m2 = 66 mg in sodium chloride 0.9% 100 mL chemo infusion, 45 mg/m2 = 66 mg, Intravenous, Clinic/[facility] 1 time, 5 of 6 cycles  Administration: 66 mg (8/10/2022), 66 mg (8/17/2022), 66 mg (8/24/2022), 66 mg (9/15/2022), 66 mg (9/22/2022)       11/3/2022 - 11/3/2022 Chemotherapy    Treatment Summary   Plan Name: OP DURVALUMAB 1500 MG Q4W  Treatment Goal: Curative  Status: Inactive  Start Date: 11/3/2022  End Date: 11/3/2022  Provider: Lindsay Paiz MD  Chemotherapy: [No matching medication found in this treatment plan]         Cancer Staging  Right lung squamous cell carcinoma [id number], stage IIIB      Objective:      Vitals:    12/15/22 1146   BP: (!) 151/85   Pulse: 85   Temp: 97.6 °F (36.4 °C)      ?   ECOG:?2  General appearance: Generally well appearing, in no acute distress.   Head, eyes, ears, nose, and throat: moist mucous membranes.   Respiratory:  Normal work of breathing  Abdomen: cachectic, nontender, nondistended.   Extremities: Warm, without edema.   Neurologic: Alert and oriented. Grossly normal strength, coordination, and gait.   Skin: No rashes, ecchymoses or petechial lesion.   Psychiatric:  Normal mood and affect.    ?   Laboratory:  ?   No visits with results within 1 Day(s) from this visit.   Latest known visit with results is:   Lab Visit on 12/12/2022   Component Date Value Ref Range Status    WBC 12/12/2022 6.54  3.90 - 12.70 K/uL Final    RBC 12/12/2022 4.74  4.60 - 6.20 M/uL Final    Hemoglobin  12/12/2022 14.7  14.0 - 18.0 g/dL Final    Hematocrit 12/12/2022 48.9  40.0 - 54.0 % Final    MCV 12/12/2022 103 (H)  82 - 98 fL Final    MCH 12/12/2022 31.0  27.0 - 31.0 pg Final    MCHC 12/12/2022 30.1 (L)  32.0 - 36.0 g/dL Final    RDW 12/12/2022 14.6 (H)  11.5 - 14.5 % Final    Platelets 12/12/2022 104 (L)  150 - 450 K/uL Final    MPV 12/12/2022 9.9  9.2 - 12.9 fL Final    Immature Granulocytes 12/12/2022 0.5  0.0 - 0.5 % Final    Gran # (ANC) 12/12/2022 5.2  1.8 - 7.7 K/uL Final    Immature Grans (Abs) 12/12/2022 0.03  0.00 - 0.04 K/uL Final    Lymph # 12/12/2022 0.8 (L)  1.0 - 4.8 K/uL Final    Mono # 12/12/2022 0.3  0.3 - 1.0 K/uL Final    Eos # 12/12/2022 0.1  0.0 - 0.5 K/uL Final    Baso # 12/12/2022 0.03  0.00 - 0.20 K/uL Final    nRBC 12/12/2022 0  0 /100 WBC Final    Gran % 12/12/2022 80.1 (H)  38.0 - 73.0 % Final    Lymph % 12/12/2022 12.8 (L)  18.0 - 48.0 % Final    Mono % 12/12/2022 5.0  4.0 - 15.0 % Final    Eosinophil % 12/12/2022 1.1  0.0 - 8.0 % Final    Basophil % 12/12/2022 0.5  0.0 - 1.9 % Final    Differential Method 12/12/2022 Automated   Final    Sodium 12/12/2022 145  136 - 145 mmol/L Final    Potassium 12/12/2022 5.3 (H)  3.5 - 5.1 mmol/L Final    Chloride 12/12/2022 106  95 - 110 mmol/L Final    CO2 12/12/2022 22 (L)  23 - 29 mmol/L Final    Glucose 12/12/2022 119 (H)  70 - 110 mg/dL Final    BUN 12/12/2022 17  8 - 23 mg/dL Final    Creatinine 12/12/2022 0.8  0.5 - 1.4 mg/dL Final    Calcium 12/12/2022 9.4  8.7 - 10.5 mg/dL Final    Total Protein 12/12/2022 8.6 (H)  6.0 - 8.4 g/dL Final    Albumin 12/12/2022 2.8 (L)  3.5 - 5.2 g/dL Final    Total Bilirubin 12/12/2022 0.5  0.1 - 1.0 mg/dL Final    Alkaline Phosphatase 12/12/2022 108  55 - 135 U/L Final    AST 12/12/2022 17  10 - 40 U/L Final    ALT 12/12/2022 10  10 - 44 U/L Final    Anion Gap 12/12/2022 17 (H)  8 - 16 mmol/L Final    eGFR 12/12/2022 >60  >60 mL/min/1.73 m^2 Final       Imaging:    Results for orders placed or performed  during the hospital encounter of 12/12/22 (from the past 2160 hour(s))   CT Chest Abdomen Pelvis With Contrast (xpd)    Impression    1. Progression of disease with enlargement of the metastatic mediastinal adenopathy.  The cavitary mass in the right lung has not significantly changed.  2. Worsening postobstructive atelectasis in the right lower lobe with scattered reticulonodular opacities in the right middle and right upper lobe favored to represent a combination of postobstructive consolidation and aspiration given the debris in the right bronchus.  3. Unchanged subcentimeter hepatic hypodensities which are too small to accurately characterize.      Electronically signed by: Abhilash Self MD  Date:    12/12/2022  Time:    15:28     No results found for this or any previous visit (from the past 2160 hour(s)).    No results found for this or any previous visit (from the past 2160 hour(s)).  PET CT 5/25/22:  Head/neck: Normal physiologic activity present.    Chest: 6.2 x 5.2 cm cavitary mass lesion within the right lung which extends to the right hilum and abuts the posteromedial pleura.  Lesion is FDG avid within SUV max of 35.  Adjacent atelectasis/airspace opacity present.  No other FDG avid pulmonary nodule appreciated.  Correlate with diagnostic CT chest imaging.     No hilar FDG avid lymph nodes.  No axillary FDG avid lymph nodes.  Mildly enlarged right paratracheal lymph no demonstrating low level activity maintaining fatty hilum. Subcentimeter short axis more cranial right paratracheal lymph node present demonstrating a SUV max 2.7.  Subcentimeter subcarinal lymph node with low level activity.     Bilateral gynecomastia noted.     Abdomen/pelvis: Physiologic activity without concerning FDG avid focus.     Renal cysts present.  Prostate enlargement.  Left inguinal hernia containing a portion of the sigmoid colon.  No evidence of obstruction or strangulation.     Skeletal/subcutaneous structures: No FDG avid  osseous lesion.     Impression:     Large cavitary FDG avid right lung mass.  Malignancy versus infectious etiology..  Mediastinal lymph nodes as above.  Other findings as above.    Pathology:    1. AND 2. RIGHT LOWER LOBE TRANSBRONCHIAL AND BRONCHIAL BIOPSIES:   POSITIVE FOR SQUAMOUS CARCINOMA    ?   Assessment/Plan:   Squamous cell carcinoma of right lung  -     Ambulatory referral/consult to Hospice; Future; Expected date: 12/22/2022             1. Squamous cell carcinoma of right lung                Plan:     Problem List Items Addressed This Visit          Oncology    Squamous cell carcinoma of right lung - Primary           # Squamous Cell Carcinoma of Right Lung, Stage IIIB; [id number]   Completed definitive chemoradiation 9/2022. We reviewed restaging imaging however this is in comparison to scans end of May 2023 and suspect interval growth prior to initiation of radiation; per discussion with Radiation Oncology interval scans throughout treatment did show improvement inpatient is clinically improved.  We discussed plan for short interval imaging in 2-3 months to ensure response to therapy.      Had been on surveillance with durvalumab however due to clinical decline will hold further treatments and provide supportive care per below.  Discussed risks and benefits and he is understanding of this and amenable to no further durvalumab treatments.      Given functional status decline I recommended restaging imaging which unfortunately shows progressive disease in adenopathy and postobstructive process likely contributing to his functional decline anorexia and dyspnea.  We discussed given his current functional status he would not be candidate for further treatment.  I reviewed his imaging and case with Radiation Oncology who agrees that no further treatment would be recommended given his current functional status ecog 4.  We discussed options for palliative management including hospice and they are amenable to  consultation, order placed and will have social work reach out to them to help arrange.    Follow-Up:     Route Chart for Scheduling    Med Onc Chart Routing      Follow up with physician No follow up needed. referral to hospice, SW to assist please   Follow up with [facility]    Infusion scheduling note    Injection scheduling note    Labs    Imaging    Pharmacy appointment    Other referrals          Supportive Plan Information  OP FERUMOXYTOL   Lindsay Paiz MD   Upcoming Treatment Dates - OP FERUMOXYTOL    No upcoming days in selected categories.    45 minutes of total time spent on the encounter, which includes face to face time and non-face to face time preparing to see the patient (eg, review of tests), Obtaining and/or reviewing separately obtained history, Documenting clinical information in the electronic or other health record, Independently interpreting results (not separately reported) and communicating results to the patient/family/caregiver, or Care coordination (not separately reported).

## 2022-12-15 NOTE — Clinical Note
See note?hospice needed Khmer speaking. Would assess for O2 need he is SOB with his disease progression

## 2022-12-15 NOTE — TELEPHONE ENCOUNTER
JARRETT spoke with Brook at [facility] re: pt referral. Brook confirmed that they have a  that can work with pt/family. JARRETT faxed face sheet, orders, and today's office visit/progress note to [facility] at [phone]. JARRETT also notified Brook of pt's needs for O2 per message from Dr. Paiz. Brook stated that she will reach out to pt/family and follow up with JARRETT if/when consents are signed and pt has been assessed. SW will remain available. Lucie Yoo LMSW

## 2022-12-16 ENCOUNTER — DOCUMENTATION ONLY (OUTPATIENT)
Dept: HEMATOLOGY/ONCOLOGY | Facility: CLINIC | Age: 87
End: 2022-12-16
Payer: MEDICAID

## 2022-12-16 NOTE — PROGRESS NOTES
JARRETT received confirmation from Brook with [facility] [phone] that Hospice consents were signed this morning, and pt has received his O2. JARRETT sent message to Dr. Paiz informing of the above. JARRETT will remain available. Lucie Yoo LMSW

## 2022-12-21 ENCOUNTER — TELEPHONE (OUTPATIENT)
Dept: HEMATOLOGY/ONCOLOGY | Facility: CLINIC | Age: 87
End: 2022-12-21
Payer: MEDICAID

## 2022-12-21 NOTE — TELEPHONE ENCOUNTER
Lauren received call from venkata Joseph's caregiver. Opal inquired about medical bed for pickup as Opal reports pt.'s hospice agency has provided a medical bed. Lauren called [facility] to confirm order and gather information for pickup. Lauren returned Opal's call and provided Opal with number to call to schedule equipment pickup. Opal verbalized understanding. There were no other needs. Lauren will remain available.

## 2022-12-28 ENCOUNTER — TELEPHONE (OUTPATIENT)
Dept: UROLOGY | Facility: CLINIC | Age: 87
End: 2022-12-28
Payer: MEDICAID

## 2022-12-28 NOTE — TELEPHONE ENCOUNTER
Tried calling pt to let him know that Dr Saavedra will not be here on 1/6 but he did not answer. Left message letting him know that his apt will need to be rescheduled.

## 2022-12-30 ENCOUNTER — TELEPHONE (OUTPATIENT)
Dept: UROLOGY | Facility: CLINIC | Age: 87
End: 2022-12-30
Payer: MEDICAID

## 2023-05-23 ENCOUNTER — PATIENT MESSAGE (OUTPATIENT)
Dept: RESEARCH | Facility: HOSPITAL | Age: 88
End: 2023-05-23
Payer: MEDICAID

## 2023-05-29 NOTE — ASSESSMENT & PLAN NOTE
marinol not covered by his insurance. Will call in megace. We discussed the sides effects including blood clot. He understands and willing to take the risk.    Self